# Patient Record
Sex: MALE | Race: WHITE | Employment: OTHER | URBAN - METROPOLITAN AREA
[De-identification: names, ages, dates, MRNs, and addresses within clinical notes are randomized per-mention and may not be internally consistent; named-entity substitution may affect disease eponyms.]

---

## 2017-02-15 ENCOUNTER — HOSPITAL ENCOUNTER (OUTPATIENT)
Dept: LAB | Age: 77
Discharge: HOME | End: 2017-02-15

## 2017-02-15 PROCEDURE — 88305 TISSUE EXAM BY PATHOLOGIST: CPT | Performed by: INTERNAL MEDICINE

## 2017-08-23 ENCOUNTER — APPOINTMENT (RX ONLY)
Dept: URBAN - METROPOLITAN AREA CLINIC 349 | Facility: CLINIC | Age: 77
Setting detail: DERMATOLOGY
End: 2017-08-23

## 2017-08-23 DIAGNOSIS — Z85.828 PERSONAL HISTORY OF OTHER MALIGNANT NEOPLASM OF SKIN: ICD-10-CM

## 2017-08-23 DIAGNOSIS — D485 NEOPLASM OF UNCERTAIN BEHAVIOR OF SKIN: ICD-10-CM

## 2017-08-23 DIAGNOSIS — Z87.2 PERSONAL HISTORY OF DISEASES OF THE SKIN AND SUBCUTANEOUS TISSUE: ICD-10-CM

## 2017-08-23 DIAGNOSIS — D22 MELANOCYTIC NEVI: ICD-10-CM | Status: STABLE

## 2017-08-23 DIAGNOSIS — L57.0 ACTINIC KERATOSIS: ICD-10-CM

## 2017-08-23 PROBLEM — D22.5 MELANOCYTIC NEVI OF TRUNK: Status: ACTIVE | Noted: 2017-08-23

## 2017-08-23 PROBLEM — D22.61 MELANOCYTIC NEVI OF RIGHT UPPER LIMB, INCLUDING SHOULDER: Status: ACTIVE | Noted: 2017-08-23

## 2017-08-23 PROBLEM — D22.71 MELANOCYTIC NEVI OF RIGHT LOWER LIMB, INCLUDING HIP: Status: ACTIVE | Noted: 2017-08-23

## 2017-08-23 PROBLEM — D22.62 MELANOCYTIC NEVI OF LEFT UPPER LIMB, INCLUDING SHOULDER: Status: ACTIVE | Noted: 2017-08-23

## 2017-08-23 PROBLEM — I48.91 UNSPECIFIED ATRIAL FIBRILLATION: Status: ACTIVE | Noted: 2017-08-23

## 2017-08-23 PROBLEM — D22.72 MELANOCYTIC NEVI OF LEFT LOWER LIMB, INCLUDING HIP: Status: ACTIVE | Noted: 2017-08-23

## 2017-08-23 PROBLEM — I10 ESSENTIAL (PRIMARY) HYPERTENSION: Status: ACTIVE | Noted: 2017-08-23

## 2017-08-23 PROBLEM — D48.5 NEOPLASM OF UNCERTAIN BEHAVIOR OF SKIN: Status: ACTIVE | Noted: 2017-08-23

## 2017-08-23 PROBLEM — E78.5 HYPERLIPIDEMIA, UNSPECIFIED: Status: ACTIVE | Noted: 2017-08-23

## 2017-08-23 PROBLEM — C44.519 BASAL CELL CARCINOMA OF SKIN OF OTHER PART OF TRUNK: Status: ACTIVE | Noted: 2017-08-23

## 2017-08-23 PROBLEM — I25.10 ATHEROSCLEROTIC HEART DISEASE OF NATIVE CORONARY ARTERY WITHOUT ANGINA PECTORIS: Status: ACTIVE | Noted: 2017-08-23

## 2017-08-23 PROCEDURE — ? COUNSELING

## 2017-08-23 PROCEDURE — 99214 OFFICE O/P EST MOD 30 MIN: CPT | Mod: 25

## 2017-08-23 PROCEDURE — 17003 DESTRUCT PREMALG LES 2-14: CPT

## 2017-08-23 PROCEDURE — ? BIOPSY BY SHAVE METHOD

## 2017-08-23 PROCEDURE — ? LIQUID NITROGEN

## 2017-08-23 PROCEDURE — A4550 SURGICAL TRAYS: HCPCS

## 2017-08-23 PROCEDURE — 17000 DESTRUCT PREMALG LESION: CPT

## 2017-08-23 PROCEDURE — ? OBSERVATION

## 2017-08-23 PROCEDURE — 11100: CPT | Mod: 59

## 2017-08-23 PROCEDURE — ? MEDICAL PHOTOGRAPHY REVIEW

## 2017-08-23 PROCEDURE — 88304 TISSUE EXAM BY PATHOLOGIST: CPT

## 2017-08-23 PROCEDURE — ? PATHOLOGY BILLING

## 2017-08-23 ASSESSMENT — LOCATION SIMPLE DESCRIPTION DERM
LOCATION SIMPLE: RIGHT UPPER BACK
LOCATION SIMPLE: ABDOMEN
LOCATION SIMPLE: LEFT UPPER BACK
LOCATION SIMPLE: RIGHT FOREHEAD
LOCATION SIMPLE: LOWER BACK
LOCATION SIMPLE: LEFT FOREARM
LOCATION SIMPLE: LEFT CALF
LOCATION SIMPLE: RIGHT PRETIBIAL REGION
LOCATION SIMPLE: LEFT UPPER ARM
LOCATION SIMPLE: LEFT PRETIBIAL REGION
LOCATION SIMPLE: LEFT HAND
LOCATION SIMPLE: RIGHT LOWER BACK
LOCATION SIMPLE: RIGHT CHEEK
LOCATION SIMPLE: LEFT POSTERIOR THIGH
LOCATION SIMPLE: RIGHT SHOULDER
LOCATION SIMPLE: RIGHT POSTERIOR THIGH
LOCATION SIMPLE: UPPER BACK
LOCATION SIMPLE: RIGHT UPPER ARM
LOCATION SIMPLE: LEFT FOREHEAD

## 2017-08-23 ASSESSMENT — LOCATION DETAILED DESCRIPTION DERM
LOCATION DETAILED: RIGHT MEDIAL FOREHEAD
LOCATION DETAILED: SUPERIOR LUMBAR SPINE
LOCATION DETAILED: LEFT PROXIMAL DORSAL FOREARM
LOCATION DETAILED: LEFT LATERAL FOREHEAD
LOCATION DETAILED: RIGHT ANTERIOR SHOULDER
LOCATION DETAILED: RIGHT CENTRAL MALAR CHEEK
LOCATION DETAILED: RIGHT SUPERIOR MEDIAL LOWER BACK
LOCATION DETAILED: RIGHT DISTAL PRETIBIAL REGION
LOCATION DETAILED: LEFT DISTAL POSTERIOR THIGH
LOCATION DETAILED: RIGHT PROXIMAL POSTERIOR THIGH
LOCATION DETAILED: LEFT ULNAR DORSAL HAND
LOCATION DETAILED: RIGHT SUPERIOR FOREHEAD
LOCATION DETAILED: LEFT FOREHEAD
LOCATION DETAILED: INFERIOR THORACIC SPINE
LOCATION DETAILED: LEFT PROXIMAL PRETIBIAL REGION
LOCATION DETAILED: LEFT MID-UPPER BACK
LOCATION DETAILED: RIGHT LATERAL ABDOMEN
LOCATION DETAILED: RIGHT PROXIMAL POSTERIOR UPPER ARM
LOCATION DETAILED: LEFT PROXIMAL POSTERIOR UPPER ARM
LOCATION DETAILED: RIGHT MID-UPPER BACK
LOCATION DETAILED: LEFT DISTAL CALF

## 2017-08-23 ASSESSMENT — LOCATION ZONE DERM
LOCATION ZONE: ARM
LOCATION ZONE: LEG
LOCATION ZONE: TRUNK
LOCATION ZONE: HAND
LOCATION ZONE: FACE

## 2017-08-23 ASSESSMENT — PAIN INTENSITY VAS: HOW INTENSE IS YOUR PAIN 0 BEING NO PAIN, 10 BEING THE MOST SEVERE PAIN POSSIBLE?: NO PAIN

## 2017-08-23 NOTE — PROCEDURE: BIOPSY BY SHAVE METHOD
Biopsy Method: Dermablade
Post-Care Instructions: I reviewed with the patient in detail post-care instructions. Patient is to keep the biopsy site dry overnight, and then apply Vaseline  daily until healed. Patient may apply hydrogen peroxide soaks to remove any crusting. After the procedure, the patient was oriented to person, place, and time. Patient denied feeling dizzy, queasy, and and declined further observation after initial 5 minute observation time.
Biopsy Type: H and E
Cryotherapy Text: The wound bed was treated with cryotherapy after the biopsy was performed.
Type Of Destruction Used: Curettage
Render Post-Care Instructions In Note?: yes
Destruction After The Procedure: No
Electrodesiccation Text: The wound bed was treated with electrodesiccation after the biopsy was performed.
Detail Level: Detailed
Dressing: bandage
Consent: Written consent was obtained and risks were reviewed including but not limited to scarring, infection, bleeding, scabbing, incomplete removal, nerve damage and allergy to anesthesia.
Anesthesia Volume In Cc (Will Not Render If 0): 0.5
Notification Instructions: Patient will be notified of biopsy results. However, patient instructed to call the office if not contacted within 2 weeks. After the procedure, the patient was oriented to person, place, and time. Patient denied feeling dizzy, queasy, and and declined further observation after initial 5 minute observation time.
Electrodesiccation And Curettage Text: The wound bed was treated with electrodesiccation and curettage after the biopsy was performed.
Silver Nitrate Text: The wound bed was treated with silver nitrate after the biopsy was performed.
Size Of Lesion In Cm: 0
Wound Care: Vaseline
Billing Type: Third-Party Bill
Curettage Text: The wound bed was treated with curettage after the biopsy was performed.
Accession #: LHP - physician reading
Anesthesia Type: 1% lidocaine with epinephrine
Hemostasis: Aluminum Chloride

## 2017-09-08 ENCOUNTER — APPOINTMENT (RX ONLY)
Dept: URBAN - METROPOLITAN AREA CLINIC 349 | Facility: CLINIC | Age: 77
Setting detail: DERMATOLOGY
End: 2017-09-08

## 2017-09-08 PROBLEM — I10 ESSENTIAL (PRIMARY) HYPERTENSION: Status: ACTIVE | Noted: 2017-09-08

## 2017-09-08 PROBLEM — C44.519 BASAL CELL CARCINOMA OF SKIN OF OTHER PART OF TRUNK: Status: ACTIVE | Noted: 2017-09-08

## 2017-09-08 PROCEDURE — ? CURETTAGE AND DESTRUCTION

## 2017-09-08 PROCEDURE — ? COUNSELING

## 2017-09-08 PROCEDURE — A4550 SURGICAL TRAYS: HCPCS

## 2017-09-08 PROCEDURE — 17263 DSTRJ MAL LES T/A/L 2.1-3.0: CPT

## 2017-09-08 NOTE — PROCEDURE: CURETTAGE AND DESTRUCTION
Cautery Type: electrodesiccation
Bill For Surgical Tray: yes
Anesthesia Volume In Cc: 1.5
Bill As A Line Item Or As Units: Line Item
Detail Level: Detailed
Post-Care Instructions: I reviewed with the patient in detail post-care instructions. Patient is to keep the area dry for 48 hours, and not to engage in any swimming until the area is healed. Should the patient develop any fevers, chills, bleeding, severe pain patient will contact the office immediately. After the procedure, the patient was oriented to person, place, and time. Patient denied feeling dizzy, queasy, and and declined further observation after initial 5 minute observation time.
What Was Performed First?: Curettage
Size Of Lesion In Cm: 2.7
Consent was obtained from the patient. The risks, benefits and alternatives to therapy were discussed in detail. Specifically, the risks of infection, scarring, bleeding, prolonged wound healing, nerve injury, incomplete removal, allergy to anesthesia and recurrence were addressed. Alternatives to ED&C, such as: surgical removal and XRT were also discussed.  Prior to the procedure, the treatment site was clearly identified and confirmed by the patient. All components of Universal Protocol/PAUSE Rule completed.
Anesthesia Type: 1% lidocaine with epinephrine
Number Of Curettages: 3
Additional Information: (Optional): The wound was cleaned, and a pressure dressing was applied.  The patient received detailed post-op instructions.

## 2018-01-01 ENCOUNTER — HOME CARE VISIT (OUTPATIENT)
Dept: SCHEDULING | Facility: HOME HEALTH | Age: 78
End: 2018-01-01
Payer: MEDICARE

## 2018-01-01 ENCOUNTER — HOSPICE ADMISSION (OUTPATIENT)
Dept: HOSPICE | Facility: HOSPICE | Age: 78
End: 2018-01-01
Payer: MEDICARE

## 2018-01-01 ENCOUNTER — PATIENT OUTREACH (OUTPATIENT)
Dept: CASE MANAGEMENT | Age: 78
End: 2018-01-01

## 2018-01-01 ENCOUNTER — HOME CARE VISIT (OUTPATIENT)
Dept: HOME HEALTH SERVICES | Facility: HOME HEALTH | Age: 78
End: 2018-01-01
Payer: MEDICARE

## 2018-01-01 ENCOUNTER — APPOINTMENT (OUTPATIENT)
Dept: PHYSICAL THERAPY | Age: 78
End: 2018-01-01
Payer: MEDICARE

## 2018-01-01 ENCOUNTER — APPOINTMENT (OUTPATIENT)
Dept: PHYSICAL THERAPY | Age: 78
End: 2018-01-01

## 2018-01-01 ENCOUNTER — HOSPITAL ENCOUNTER (OUTPATIENT)
Dept: PHYSICAL THERAPY | Age: 78
Discharge: HOME | End: 2018-05-03

## 2018-01-01 ENCOUNTER — HOSPITAL ENCOUNTER (OUTPATIENT)
Dept: NUCLEAR MEDICINE | Age: 78
Discharge: HOME | End: 2018-07-02
Attending: FAMILY MEDICINE
Payer: MEDICARE

## 2018-01-01 ENCOUNTER — APPOINTMENT (OUTPATIENT)
Dept: GENERAL RADIOLOGY | Age: 78
DRG: 982 | End: 2018-01-01
Attending: INTERNAL MEDICINE
Payer: MEDICARE

## 2018-01-01 ENCOUNTER — ANESTHESIA (OUTPATIENT)
Dept: SURGERY | Age: 78
DRG: 982 | End: 2018-01-01
Payer: MEDICARE

## 2018-01-01 ENCOUNTER — APPOINTMENT (OUTPATIENT)
Dept: GENERAL RADIOLOGY | Age: 78
End: 2018-01-01
Payer: MEDICARE

## 2018-01-01 ENCOUNTER — HOSPITAL ENCOUNTER (OUTPATIENT)
Dept: PHYSICAL THERAPY | Age: 78
Discharge: HOME | End: 2018-04-09
Payer: MEDICARE

## 2018-01-01 ENCOUNTER — HOSPITAL ENCOUNTER (EMERGENCY)
Age: 78
Discharge: HOME | End: 2018-05-06
Attending: EMERGENCY MEDICINE
Payer: MEDICARE

## 2018-01-01 ENCOUNTER — HOSPITAL ENCOUNTER (OUTPATIENT)
Dept: PHYSICAL THERAPY | Age: 78
Discharge: HOME | End: 2018-04-06
Payer: MEDICARE

## 2018-01-01 ENCOUNTER — HOSPITAL ENCOUNTER (INPATIENT)
Age: 78
LOS: 2 days | End: 2018-09-02
Attending: INTERNAL MEDICINE | Admitting: INTERNAL MEDICINE

## 2018-01-01 ENCOUNTER — HOSPITAL ENCOUNTER (EMERGENCY)
Age: 78
Discharge: HOME | DRG: 375 | End: 2018-08-04
Attending: EMERGENCY MEDICINE
Payer: MEDICARE

## 2018-01-01 ENCOUNTER — APPOINTMENT (OUTPATIENT)
Dept: GENERAL RADIOLOGY | Age: 78
End: 2018-01-01
Attending: EMERGENCY MEDICINE
Payer: MEDICARE

## 2018-01-01 ENCOUNTER — HOSPITAL ENCOUNTER (OUTPATIENT)
Dept: PHYSICAL THERAPY | Age: 78
Discharge: HOME | End: 2018-04-23
Payer: MEDICARE

## 2018-01-01 ENCOUNTER — ANESTHESIA EVENT (OUTPATIENT)
Dept: ENDOSCOPY | Age: 78
DRG: 375 | End: 2018-01-01
Payer: MEDICARE

## 2018-01-01 ENCOUNTER — APPOINTMENT (OUTPATIENT)
Dept: INTERVENTIONAL RADIOLOGY/VASCULAR | Age: 78
DRG: 375 | End: 2018-01-01
Attending: SURGERY
Payer: MEDICARE

## 2018-01-01 ENCOUNTER — ANESTHESIA EVENT (OUTPATIENT)
Dept: SURGERY | Age: 78
DRG: 982 | End: 2018-01-01
Payer: MEDICARE

## 2018-01-01 ENCOUNTER — HOSPITAL ENCOUNTER (OUTPATIENT)
Dept: PHYSICAL THERAPY | Age: 78
Discharge: HOME | End: 2018-03-29
Payer: MEDICARE

## 2018-01-01 ENCOUNTER — APPOINTMENT (OUTPATIENT)
Dept: GENERAL RADIOLOGY | Age: 78
DRG: 375 | End: 2018-01-01
Attending: EMERGENCY MEDICINE
Payer: MEDICARE

## 2018-01-01 ENCOUNTER — HOSPITAL ENCOUNTER (OUTPATIENT)
Dept: PHYSICAL THERAPY | Age: 78
Discharge: HOME | End: 2018-04-16
Payer: MEDICARE

## 2018-01-01 ENCOUNTER — ANESTHESIA (OUTPATIENT)
Dept: ENDOSCOPY | Age: 78
DRG: 375 | End: 2018-01-01
Payer: MEDICARE

## 2018-01-01 ENCOUNTER — HOSPITAL ENCOUNTER (OUTPATIENT)
Dept: PHYSICAL THERAPY | Age: 78
End: 2018-01-01

## 2018-01-01 ENCOUNTER — HOSPITAL ENCOUNTER (OUTPATIENT)
Dept: PHYSICAL THERAPY | Age: 78
Discharge: HOME | End: 2018-03-22
Payer: MEDICARE

## 2018-01-01 ENCOUNTER — HOME CARE VISIT (OUTPATIENT)
Dept: HOSPICE | Facility: HOSPICE | Age: 78
End: 2018-01-01
Payer: MEDICARE

## 2018-01-01 ENCOUNTER — HOSPITAL ENCOUNTER (EMERGENCY)
Age: 78
Discharge: HOME | End: 2018-08-02
Attending: EMERGENCY MEDICINE
Payer: MEDICARE

## 2018-01-01 ENCOUNTER — HOSPITAL ENCOUNTER (OUTPATIENT)
Dept: SURGERY | Age: 78
Discharge: HOME | End: 2018-07-17

## 2018-01-01 ENCOUNTER — HOSPITAL ENCOUNTER (OUTPATIENT)
Dept: PHYSICAL THERAPY | Age: 78
Discharge: HOME | End: 2018-04-20
Payer: MEDICARE

## 2018-01-01 ENCOUNTER — HOME HEALTH ADMISSION (OUTPATIENT)
Dept: HOME HEALTH SERVICES | Facility: HOME HEALTH | Age: 78
End: 2018-01-01
Payer: MEDICARE

## 2018-01-01 ENCOUNTER — APPOINTMENT (OUTPATIENT)
Dept: CT IMAGING | Age: 78
DRG: 375 | End: 2018-01-01
Attending: NURSE PRACTITIONER
Payer: MEDICARE

## 2018-01-01 ENCOUNTER — HOSPITAL ENCOUNTER (OUTPATIENT)
Dept: PHYSICAL THERAPY | Age: 78
Discharge: HOME | End: 2018-03-27
Payer: MEDICARE

## 2018-01-01 ENCOUNTER — HOSPITAL ENCOUNTER (OUTPATIENT)
Dept: PHYSICAL THERAPY | Age: 78
Discharge: HOME | End: 2018-04-17
Payer: MEDICARE

## 2018-01-01 ENCOUNTER — HOSPITAL ENCOUNTER (INPATIENT)
Age: 78
LOS: 3 days | Discharge: HOSPICE | DRG: 375 | End: 2018-08-10
Attending: SURGERY | Admitting: SURGERY
Payer: MEDICARE

## 2018-01-01 ENCOUNTER — HOSPITAL ENCOUNTER (OUTPATIENT)
Dept: PHYSICAL THERAPY | Age: 78
Discharge: HOME | End: 2018-04-13
Payer: MEDICARE

## 2018-01-01 ENCOUNTER — HOSPITAL ENCOUNTER (INPATIENT)
Age: 78
LOS: 3 days | Discharge: HOME HEALTH | DRG: 982 | End: 2018-07-24
Attending: SURGERY | Admitting: SURGERY
Payer: MEDICARE

## 2018-01-01 VITALS — HEART RATE: 92 BPM | DIASTOLIC BLOOD PRESSURE: 74 MMHG | SYSTOLIC BLOOD PRESSURE: 120 MMHG | RESPIRATION RATE: 20 BRPM

## 2018-01-01 VITALS — SYSTOLIC BLOOD PRESSURE: 132 MMHG | DIASTOLIC BLOOD PRESSURE: 74 MMHG | RESPIRATION RATE: 24 BRPM | HEART RATE: 96 BPM

## 2018-01-01 VITALS
TEMPERATURE: 97.7 F | HEART RATE: 80 BPM | SYSTOLIC BLOOD PRESSURE: 122 MMHG | RESPIRATION RATE: 20 BRPM | DIASTOLIC BLOOD PRESSURE: 60 MMHG

## 2018-01-01 VITALS
TEMPERATURE: 97.9 F | RESPIRATION RATE: 18 BRPM | OXYGEN SATURATION: 97 % | SYSTOLIC BLOOD PRESSURE: 106 MMHG | HEART RATE: 87 BPM | DIASTOLIC BLOOD PRESSURE: 58 MMHG

## 2018-01-01 VITALS
BODY MASS INDEX: 30.47 KG/M2 | HEIGHT: 69 IN | HEART RATE: 87 BPM | WEIGHT: 205.7 LBS | RESPIRATION RATE: 18 BRPM | TEMPERATURE: 97 F | DIASTOLIC BLOOD PRESSURE: 71 MMHG | SYSTOLIC BLOOD PRESSURE: 104 MMHG | OXYGEN SATURATION: 93 %

## 2018-01-01 VITALS
RESPIRATION RATE: 16 BRPM | DIASTOLIC BLOOD PRESSURE: 63 MMHG | SYSTOLIC BLOOD PRESSURE: 118 MMHG | OXYGEN SATURATION: 94 % | HEIGHT: 69 IN | BODY MASS INDEX: 33.33 KG/M2 | WEIGHT: 225 LBS | HEART RATE: 70 BPM | TEMPERATURE: 97.5 F

## 2018-01-01 VITALS
RESPIRATION RATE: 16 BRPM | HEART RATE: 78 BPM | SYSTOLIC BLOOD PRESSURE: 131 MMHG | BODY MASS INDEX: 31.23 KG/M2 | OXYGEN SATURATION: 92 % | DIASTOLIC BLOOD PRESSURE: 79 MMHG | TEMPERATURE: 97.8 F | WEIGHT: 211.5 LBS

## 2018-01-01 VITALS
SYSTOLIC BLOOD PRESSURE: 118 MMHG | TEMPERATURE: 98 F | DIASTOLIC BLOOD PRESSURE: 69 MMHG | HEIGHT: 69 IN | OXYGEN SATURATION: 99 % | WEIGHT: 237 LBS | RESPIRATION RATE: 16 BRPM | HEART RATE: 80 BPM | BODY MASS INDEX: 35.1 KG/M2

## 2018-01-01 VITALS
HEART RATE: 96 BPM | DIASTOLIC BLOOD PRESSURE: 81 MMHG | RESPIRATION RATE: 20 BRPM | RESPIRATION RATE: 19 BRPM | HEART RATE: 84 BPM | SYSTOLIC BLOOD PRESSURE: 116 MMHG | SYSTOLIC BLOOD PRESSURE: 143 MMHG | DIASTOLIC BLOOD PRESSURE: 66 MMHG | TEMPERATURE: 98.3 F

## 2018-01-01 VITALS
DIASTOLIC BLOOD PRESSURE: 76 MMHG | SYSTOLIC BLOOD PRESSURE: 136 MMHG | TEMPERATURE: 98.1 F | RESPIRATION RATE: 16 BRPM | HEART RATE: 90 BPM

## 2018-01-01 VITALS
OXYGEN SATURATION: 95 % | BODY MASS INDEX: 37.18 KG/M2 | HEIGHT: 69 IN | RESPIRATION RATE: 21 BRPM | DIASTOLIC BLOOD PRESSURE: 60 MMHG | SYSTOLIC BLOOD PRESSURE: 128 MMHG | TEMPERATURE: 98.2 F | WEIGHT: 251 LBS | HEART RATE: 74 BPM

## 2018-01-01 VITALS — DIASTOLIC BLOOD PRESSURE: 74 MMHG | SYSTOLIC BLOOD PRESSURE: 120 MMHG | HEART RATE: 76 BPM | RESPIRATION RATE: 20 BRPM

## 2018-01-01 VITALS — BODY MASS INDEX: 36.18 KG/M2 | WEIGHT: 245 LBS

## 2018-01-01 VITALS
TEMPERATURE: 97.7 F | OXYGEN SATURATION: 95 % | BODY MASS INDEX: 35.1 KG/M2 | HEART RATE: 66 BPM | SYSTOLIC BLOOD PRESSURE: 118 MMHG | WEIGHT: 237 LBS | DIASTOLIC BLOOD PRESSURE: 62 MMHG | RESPIRATION RATE: 18 BRPM | HEIGHT: 69 IN

## 2018-01-01 VITALS — WEIGHT: 244 LBS | BODY MASS INDEX: 36.14 KG/M2 | HEIGHT: 69 IN

## 2018-01-01 VITALS — SYSTOLIC BLOOD PRESSURE: 126 MMHG | RESPIRATION RATE: 22 BRPM | HEART RATE: 96 BPM | DIASTOLIC BLOOD PRESSURE: 60 MMHG

## 2018-01-01 DIAGNOSIS — R18.0 MALIGNANT ASCITES: ICD-10-CM

## 2018-01-01 DIAGNOSIS — R00.2 PALPITATIONS: Primary | ICD-10-CM

## 2018-01-01 DIAGNOSIS — N17.9 ACUTE RENAL FAILURE SUPERIMPOSED ON CHRONIC KIDNEY DISEASE, UNSPECIFIED CKD STAGE, UNSPECIFIED ACUTE RENAL FAILURE TYPE (HCC): ICD-10-CM

## 2018-01-01 DIAGNOSIS — I25.10 CORONARY ARTERY DISEASE INVOLVING NATIVE CORONARY ARTERY OF NATIVE HEART WITHOUT ANGINA PECTORIS: ICD-10-CM

## 2018-01-01 DIAGNOSIS — K80.00 CALCULUS OF GALLBLADDER WITH ACUTE CHOLECYSTITIS WITHOUT OBSTRUCTION: Primary | Chronic | ICD-10-CM

## 2018-01-01 DIAGNOSIS — R10.9 ABDOMINAL PAIN, UNSPECIFIED ABDOMINAL LOCATION: ICD-10-CM

## 2018-01-01 DIAGNOSIS — J98.11 ATELECTASIS: ICD-10-CM

## 2018-01-01 DIAGNOSIS — K80.12 CALCULUS OF GALLBLADDER WITH ACUTE ON CHRONIC CHOLECYSTITIS WITHOUT OBSTRUCTION: ICD-10-CM

## 2018-01-01 DIAGNOSIS — C78.6 PERITONEAL CARCINOMATOSIS (HCC): Primary | ICD-10-CM

## 2018-01-01 DIAGNOSIS — N18.9 ACUTE RENAL FAILURE SUPERIMPOSED ON CHRONIC KIDNEY DISEASE, UNSPECIFIED CKD STAGE, UNSPECIFIED ACUTE RENAL FAILURE TYPE (HCC): ICD-10-CM

## 2018-01-01 DIAGNOSIS — Z90.49 S/P LAPAROSCOPIC CHOLECYSTECTOMY: ICD-10-CM

## 2018-01-01 DIAGNOSIS — E66.01 SEVERE OBESITY (BMI 35.0-39.9) WITH COMORBIDITY (HCC): ICD-10-CM

## 2018-01-01 DIAGNOSIS — N18.30 CHRONIC KIDNEY DISEASE, STAGE III (MODERATE) (HCC): ICD-10-CM

## 2018-01-01 DIAGNOSIS — K59.00 CONSTIPATION, UNSPECIFIED CONSTIPATION TYPE: Primary | ICD-10-CM

## 2018-01-01 DIAGNOSIS — D69.6 THROMBOCYTOPENIA (HCC): ICD-10-CM

## 2018-01-01 DIAGNOSIS — C78.6 PERITONEAL CARCINOMATOSIS (HCC): ICD-10-CM

## 2018-01-01 DIAGNOSIS — R14.3 FLATULENCE: ICD-10-CM

## 2018-01-01 DIAGNOSIS — R10.84 GENERALIZED ABDOMINAL PAIN: ICD-10-CM

## 2018-01-01 DIAGNOSIS — R09.02 HYPOXIA: ICD-10-CM

## 2018-01-01 DIAGNOSIS — J95.821 RESPIRATORY FAILURE, POST-OPERATIVE (HCC): ICD-10-CM

## 2018-01-01 DIAGNOSIS — I10 ESSENTIAL HYPERTENSION: ICD-10-CM

## 2018-01-01 LAB
ALBUMIN SERPL-MCNC: 2.6 G/DL (ref 3.2–4.6)
ALBUMIN SERPL-MCNC: 3.8 G/DL (ref 3.2–4.6)
ALBUMIN/GLOB SERPL: 0.7 {RATIO} (ref 1.2–3.5)
ALBUMIN/GLOB SERPL: 1.2 {RATIO} (ref 1.2–3.5)
ALP SERPL-CCNC: 114 U/L (ref 50–136)
ALP SERPL-CCNC: 53 U/L (ref 50–136)
ALT SERPL-CCNC: 24 U/L (ref 12–65)
ALT SERPL-CCNC: 52 U/L (ref 12–65)
ANION GAP SERPL CALC-SCNC: 10 MMOL/L (ref 7–16)
ANION GAP SERPL CALC-SCNC: 12 MMOL/L (ref 7–16)
ANION GAP SERPL CALC-SCNC: 13 MMOL/L (ref 7–16)
ANION GAP SERPL CALC-SCNC: 6 MMOL/L (ref 7–16)
ANION GAP SERPL CALC-SCNC: 8 MMOL/L (ref 7–16)
AST SERPL-CCNC: 30 U/L (ref 15–37)
AST SERPL-CCNC: 73 U/L (ref 15–37)
ATRIAL RATE: 90 BPM
BACTERIA URNS QL MICRO: NORMAL /HPF
BASOPHILS # BLD: 0 K/UL (ref 0–0.2)
BASOPHILS NFR BLD: 0 % (ref 0–2)
BILIRUB SERPL-MCNC: 0.7 MG/DL (ref 0.2–1.1)
BILIRUB SERPL-MCNC: 1.1 MG/DL (ref 0.2–1.1)
BUN SERPL-MCNC: 22 MG/DL (ref 8–23)
BUN SERPL-MCNC: 24 MG/DL (ref 8–23)
BUN SERPL-MCNC: 30 MG/DL (ref 8–23)
BUN SERPL-MCNC: 34 MG/DL (ref 8–23)
BUN SERPL-MCNC: 40 MG/DL (ref 8–23)
CALCIUM SERPL-MCNC: 8.7 MG/DL (ref 8.3–10.4)
CALCIUM SERPL-MCNC: 8.8 MG/DL (ref 8.3–10.4)
CALCIUM SERPL-MCNC: 8.9 MG/DL (ref 8.3–10.4)
CALCIUM SERPL-MCNC: 9.1 MG/DL (ref 8.3–10.4)
CALCIUM SERPL-MCNC: 9.5 MG/DL (ref 8.3–10.4)
CALCULATED P AXIS, ECG09: 17 DEGREES
CALCULATED R AXIS, ECG10: 39 DEGREES
CALCULATED T AXIS, ECG11: 0 DEGREES
CANCER AG19-9 SERPL-ACNC: 537.1 U/ML (ref 2–37)
CASTS URNS QL MICRO: 0 /LPF
CEA SERPL-MCNC: 2.7 NG/ML (ref 0–3)
CHLORIDE SERPL-SCNC: 104 MMOL/L (ref 98–107)
CHLORIDE SERPL-SCNC: 106 MMOL/L (ref 98–107)
CHLORIDE SERPL-SCNC: 108 MMOL/L (ref 98–107)
CHLORIDE SERPL-SCNC: 109 MMOL/L (ref 98–107)
CHLORIDE SERPL-SCNC: 109 MMOL/L (ref 98–107)
CO2 SERPL-SCNC: 25 MMOL/L (ref 21–32)
CO2 SERPL-SCNC: 25 MMOL/L (ref 21–32)
CO2 SERPL-SCNC: 27 MMOL/L (ref 21–32)
CO2 SERPL-SCNC: 28 MMOL/L (ref 21–32)
CO2 SERPL-SCNC: 31 MMOL/L (ref 21–32)
CREAT SERPL-MCNC: 1.17 MG/DL (ref 0.8–1.5)
CREAT SERPL-MCNC: 1.26 MG/DL (ref 0.8–1.5)
CREAT SERPL-MCNC: 1.33 MG/DL (ref 0.8–1.5)
CREAT SERPL-MCNC: 1.59 MG/DL (ref 0.8–1.5)
CREAT SERPL-MCNC: 1.61 MG/DL (ref 0.8–1.5)
CREAT SERPL-MCNC: 1.76 MG/DL (ref 0.8–1.5)
CREAT SERPL-MCNC: 2.43 MG/DL (ref 0.8–1.5)
CRYSTALS URNS QL MICRO: 0 /LPF
DIAGNOSIS, 93000: NORMAL
DIFFERENTIAL METHOD BLD: ABNORMAL
DIFFERENTIAL METHOD BLD: ABNORMAL
DIFFERENTIAL METHOD BLD: NORMAL
EOSINOPHIL # BLD: 0.1 K/UL (ref 0–0.8)
EOSINOPHIL NFR BLD: 1 % (ref 0.5–7.8)
EOSINOPHIL NFR BLD: 2 % (ref 0.5–7.8)
EOSINOPHIL NFR BLD: 2 % (ref 0.5–7.8)
EPI CELLS #/AREA URNS HPF: NORMAL /HPF
ERYTHROCYTE [DISTWIDTH] IN BLOOD BY AUTOMATED COUNT: 13.6 % (ref 11.9–14.6)
ERYTHROCYTE [DISTWIDTH] IN BLOOD BY AUTOMATED COUNT: 14.1 % (ref 11.9–14.6)
ERYTHROCYTE [DISTWIDTH] IN BLOOD BY AUTOMATED COUNT: 14.2 % (ref 11.9–14.6)
ERYTHROCYTE [DISTWIDTH] IN BLOOD BY AUTOMATED COUNT: 14.5 % (ref 11.9–14.6)
EST. AVERAGE GLUCOSE BLD GHB EST-MCNC: 123 MG/DL
FERRITIN SERPL-MCNC: 542 NG/ML (ref 8–388)
FOLATE SERPL-MCNC: 11.8 NG/ML (ref 3.1–17.5)
GLOBULIN SER CALC-MCNC: 3.3 G/DL (ref 2.3–3.5)
GLOBULIN SER CALC-MCNC: 3.5 G/DL (ref 2.3–3.5)
GLUCOSE BLD STRIP.AUTO-MCNC: 100 MG/DL (ref 65–100)
GLUCOSE BLD STRIP.AUTO-MCNC: 11 MG/DL (ref 65–100)
GLUCOSE BLD STRIP.AUTO-MCNC: 80 MG/DL (ref 65–100)
GLUCOSE SERPL-MCNC: 106 MG/DL (ref 65–100)
GLUCOSE SERPL-MCNC: 107 MG/DL (ref 65–100)
GLUCOSE SERPL-MCNC: 108 MG/DL (ref 65–100)
GLUCOSE SERPL-MCNC: 118 MG/DL (ref 65–100)
GLUCOSE SERPL-MCNC: 123 MG/DL (ref 65–100)
HBA1C MFR BLD: 5.9 % (ref 4.8–6)
HCT VFR BLD AUTO: 38.1 % (ref 41.1–50.3)
HCT VFR BLD AUTO: 38.6 % (ref 41.1–50.3)
HCT VFR BLD AUTO: 40.8 % (ref 41.1–50.3)
HCT VFR BLD AUTO: 43 % (ref 41.1–50.3)
HGB BLD-MCNC: 12.7 G/DL (ref 13.6–17.2)
HGB BLD-MCNC: 12.8 G/DL (ref 13.6–17.2)
HGB BLD-MCNC: 14.5 G/DL (ref 13.6–17.2)
HGB BLD-MCNC: 14.6 G/DL (ref 13.6–17.2)
HGB BLD-MCNC: 15 G/DL (ref 13.6–17.2)
IMM GRANULOCYTES # BLD: 0 K/UL (ref 0–0.5)
IMM GRANULOCYTES NFR BLD AUTO: 0 % (ref 0–5)
IRON SATN MFR SERPL: 20 %
IRON SERPL-MCNC: 41 UG/DL (ref 35–150)
LIPASE SERPL-CCNC: 307 U/L (ref 73–393)
LYMPHOCYTES # BLD: 0.5 K/UL (ref 0.5–4.6)
LYMPHOCYTES # BLD: 1.2 K/UL (ref 0.5–4.6)
LYMPHOCYTES # BLD: 1.4 K/UL (ref 0.5–4.6)
LYMPHOCYTES NFR BLD: 11 % (ref 13–44)
LYMPHOCYTES NFR BLD: 15 % (ref 13–44)
LYMPHOCYTES NFR BLD: 17 % (ref 13–44)
MCH RBC QN AUTO: 31.3 PG (ref 26.1–32.9)
MCH RBC QN AUTO: 31.8 PG (ref 26.1–32.9)
MCH RBC QN AUTO: 32.1 PG (ref 26.1–32.9)
MCH RBC QN AUTO: 33.3 PG (ref 26.1–32.9)
MCHC RBC AUTO-ENTMCNC: 33.2 G/DL (ref 31.4–35)
MCHC RBC AUTO-ENTMCNC: 33.3 G/DL (ref 31.4–35)
MCHC RBC AUTO-ENTMCNC: 34 G/DL (ref 31.4–35)
MCHC RBC AUTO-ENTMCNC: 35.5 G/DL (ref 31.4–35)
MCV RBC AUTO: 93.6 FL (ref 79.6–97.8)
MCV RBC AUTO: 94.4 FL (ref 79.6–97.8)
MCV RBC AUTO: 94.5 FL (ref 79.6–97.8)
MCV RBC AUTO: 95.5 FL (ref 79.6–97.8)
MONOCYTES # BLD: 0.4 K/UL (ref 0.1–1.3)
MONOCYTES # BLD: 0.7 K/UL (ref 0.1–1.3)
MONOCYTES # BLD: 0.8 K/UL (ref 0.1–1.3)
MONOCYTES NFR BLD: 11 % (ref 4–12)
MONOCYTES NFR BLD: 7 % (ref 4–12)
MONOCYTES NFR BLD: 9 % (ref 4–12)
MUCOUS THREADS URNS QL MICRO: 0 /LPF
NEUTS SEG # BLD: 3.7 K/UL (ref 1.7–8.2)
NEUTS SEG # BLD: 5.1 K/UL (ref 1.7–8.2)
NEUTS SEG # BLD: 7 K/UL (ref 1.7–8.2)
NEUTS SEG NFR BLD: 70 % (ref 43–78)
NEUTS SEG NFR BLD: 77 % (ref 43–78)
NEUTS SEG NFR BLD: 78 % (ref 43–78)
P-R INTERVAL, ECG05: 242 MS
PLATELET # BLD AUTO: 135 K/UL (ref 150–450)
PLATELET # BLD AUTO: 253 K/UL (ref 150–450)
PLATELET # BLD AUTO: 95 K/UL (ref 150–450)
PLATELET # BLD AUTO: 97 K/UL (ref 150–450)
PMV BLD AUTO: 12.5 FL (ref 10.8–14.1)
PMV BLD AUTO: 12.6 FL (ref 10.8–14.1)
PMV BLD AUTO: 13 FL (ref 10.8–14.1)
PMV BLD AUTO: 13.7 FL (ref 10.8–14.1)
POTASSIUM SERPL-SCNC: 3.4 MMOL/L (ref 3.5–5.1)
POTASSIUM SERPL-SCNC: 3.5 MMOL/L (ref 3.5–5.1)
POTASSIUM SERPL-SCNC: 3.6 MMOL/L (ref 3.5–5.1)
POTASSIUM SERPL-SCNC: 3.6 MMOL/L (ref 3.5–5.1)
POTASSIUM SERPL-SCNC: 3.7 MMOL/L (ref 3.5–5.1)
POTASSIUM SERPL-SCNC: 4 MMOL/L (ref 3.5–5.1)
PROT SERPL-MCNC: 6.1 G/DL (ref 6.3–8.2)
PROT SERPL-MCNC: 7.1 G/DL (ref 6.3–8.2)
Q-T INTERVAL, ECG07: 358 MS
QRS DURATION, ECG06: 90 MS
QTC CALCULATION (BEZET), ECG08: 437 MS
RBC # BLD AUTO: 3.99 M/UL (ref 4.23–5.67)
RBC # BLD AUTO: 4.09 M/UL (ref 4.23–5.67)
RBC # BLD AUTO: 4.36 M/UL (ref 4.23–5.67)
RBC # BLD AUTO: 4.55 M/UL (ref 4.23–5.67)
RBC #/AREA URNS HPF: NORMAL /HPF
SODIUM SERPL-SCNC: 143 MMOL/L (ref 136–145)
SODIUM SERPL-SCNC: 144 MMOL/L (ref 136–145)
SODIUM SERPL-SCNC: 144 MMOL/L (ref 136–145)
SODIUM SERPL-SCNC: 145 MMOL/L (ref 136–145)
SODIUM SERPL-SCNC: 145 MMOL/L (ref 136–145)
TIBC SERPL-MCNC: 206 UG/DL (ref 250–450)
TROPONIN I BLD-MCNC: 0.01 NG/ML (ref 0.02–0.05)
TROPONIN I SERPL-MCNC: <0.02 NG/ML (ref 0.02–0.05)
VENTRICULAR RATE, ECG03: 90 BPM
VIT B12 SERPL-MCNC: 683 PG/ML (ref 193–986)
WBC # BLD AUTO: 4.7 K/UL (ref 4.3–11.1)
WBC # BLD AUTO: 7.3 K/UL (ref 4.3–11.1)
WBC # BLD AUTO: 7.4 K/UL (ref 4.3–11.1)
WBC # BLD AUTO: 9.2 K/UL (ref 4.3–11.1)
WBC URNS QL MICRO: NORMAL /HPF

## 2018-01-01 PROCEDURE — 65270000029 HC RM PRIVATE

## 2018-01-01 PROCEDURE — 3331090003 HH PPS REVENUE ADJ

## 2018-01-01 PROCEDURE — 3331090001 HH PPS REVENUE CREDIT

## 2018-01-01 PROCEDURE — 74011250636 HC RX REV CODE- 250/636: Performed by: FAMILY MEDICINE

## 2018-01-01 PROCEDURE — 74011250637 HC RX REV CODE- 250/637: Performed by: EMERGENCY MEDICINE

## 2018-01-01 PROCEDURE — 74011000250 HC RX REV CODE- 250: Performed by: NURSE PRACTITIONER

## 2018-01-01 PROCEDURE — 74011636320 HC RX REV CODE- 636/320: Performed by: SURGERY

## 2018-01-01 PROCEDURE — 77030019605

## 2018-01-01 PROCEDURE — 85025 COMPLETE CBC W/AUTO DIFF WBC: CPT | Performed by: INTERNAL MEDICINE

## 2018-01-01 PROCEDURE — G0299 HHS/HOSPICE OF RN EA 15 MIN: HCPCS

## 2018-01-01 PROCEDURE — HOSPICE MEDICATION HC HH HOSPICE MEDICATION

## 2018-01-01 PROCEDURE — 74011000250 HC RX REV CODE- 250: Performed by: RADIOLOGY

## 2018-01-01 PROCEDURE — 3331090002 HH PPS REVENUE DEBIT

## 2018-01-01 PROCEDURE — 71045 X-RAY EXAM CHEST 1 VIEW: CPT

## 2018-01-01 PROCEDURE — 74011250636 HC RX REV CODE- 250/636: Performed by: ANESTHESIOLOGY

## 2018-01-01 PROCEDURE — 80048 BASIC METABOLIC PNL TOTAL CA: CPT | Performed by: SURGERY

## 2018-01-01 PROCEDURE — 0FB04ZX EXCISION OF LIVER, PERCUTANEOUS ENDOSCOPIC APPROACH, DIAGNOSTIC: ICD-10-PCS | Performed by: SURGERY

## 2018-01-01 PROCEDURE — 97113 AQUATIC THERAPY/EXERCISES: CPT

## 2018-01-01 PROCEDURE — 74011250637 HC RX REV CODE- 250/637: Performed by: INTERNAL MEDICINE

## 2018-01-01 PROCEDURE — 74011250637 HC RX REV CODE- 250/637: Performed by: SURGERY

## 2018-01-01 PROCEDURE — 94640 AIRWAY INHALATION TREATMENT: CPT

## 2018-01-01 PROCEDURE — G0155 HHCP-SVS OF CSW,EA 15 MIN: HCPCS

## 2018-01-01 PROCEDURE — 0651 HSPC ROUTINE HOME CARE

## 2018-01-01 PROCEDURE — 81003 URINALYSIS AUTO W/O SCOPE: CPT | Performed by: EMERGENCY MEDICINE

## 2018-01-01 PROCEDURE — 49083 ABD PARACENTESIS W/IMAGING: CPT

## 2018-01-01 PROCEDURE — 82728 ASSAY OF FERRITIN: CPT

## 2018-01-01 PROCEDURE — 99284 EMERGENCY DEPT VISIT MOD MDM: CPT | Performed by: EMERGENCY MEDICINE

## 2018-01-01 PROCEDURE — 97110 THERAPEUTIC EXERCISES: CPT

## 2018-01-01 PROCEDURE — 71046 X-RAY EXAM CHEST 2 VIEWS: CPT

## 2018-01-01 PROCEDURE — 0DJ08ZZ INSPECTION OF UPPER INTESTINAL TRACT, VIA NATURAL OR ARTIFICIAL OPENING ENDOSCOPIC: ICD-10-PCS | Performed by: INTERNAL MEDICINE

## 2018-01-01 PROCEDURE — 36415 COLL VENOUS BLD VENIPUNCTURE: CPT | Performed by: INTERNAL MEDICINE

## 2018-01-01 PROCEDURE — 99223 1ST HOSP IP/OBS HIGH 75: CPT | Performed by: INTERNAL MEDICINE

## 2018-01-01 PROCEDURE — 88112 CYTOPATH CELL ENHANCE TECH: CPT | Performed by: SURGERY

## 2018-01-01 PROCEDURE — 77030037400 HC ADH TISS HI VISC EXOFIN CHMP -B

## 2018-01-01 PROCEDURE — 99343 PR HOME VST NEW PATIENT MOD-HI SEVERITY 45 MINUTES: CPT | Performed by: INTERNAL MEDICINE

## 2018-01-01 PROCEDURE — 97161 PT EVAL LOW COMPLEX 20 MIN: CPT

## 2018-01-01 PROCEDURE — 74011250636 HC RX REV CODE- 250/636: Performed by: SURGERY

## 2018-01-01 PROCEDURE — 76060000034 HC ANESTHESIA 1.5 TO 2 HR: Performed by: SURGERY

## 2018-01-01 PROCEDURE — 74011000250 HC RX REV CODE- 250: Performed by: SURGERY

## 2018-01-01 PROCEDURE — 74011000250 HC RX REV CODE- 250

## 2018-01-01 PROCEDURE — 97140 MANUAL THERAPY 1/> REGIONS: CPT

## 2018-01-01 PROCEDURE — 77030010541

## 2018-01-01 PROCEDURE — 74011250637 HC RX REV CODE- 250/637: Performed by: ANESTHESIOLOGY

## 2018-01-01 PROCEDURE — G0151 HHCP-SERV OF PT,EA 15 MIN: HCPCS

## 2018-01-01 PROCEDURE — 74011250637 HC RX REV CODE- 250/637: Performed by: NURSE PRACTITIONER

## 2018-01-01 PROCEDURE — 76060000031 HC ANESTHESIA FIRST 0.5 HR: Performed by: INTERNAL MEDICINE

## 2018-01-01 PROCEDURE — 77030020782 HC GWN BAIR PAWS FLX 3M -B: Performed by: ANESTHESIOLOGY

## 2018-01-01 PROCEDURE — 77030035048 HC TRCR ENDOSC OPTCL COVD -B: Performed by: SURGERY

## 2018-01-01 PROCEDURE — 94760 N-INVAS EAR/PLS OXIMETRY 1: CPT

## 2018-01-01 PROCEDURE — 88304 TISSUE EXAM BY PATHOLOGIST: CPT | Performed by: SURGERY

## 2018-01-01 PROCEDURE — 65610000001 HC ROOM ICU GENERAL

## 2018-01-01 PROCEDURE — 97116 GAIT TRAINING THERAPY: CPT

## 2018-01-01 PROCEDURE — 0FT44ZZ RESECTION OF GALLBLADDER, PERCUTANEOUS ENDOSCOPIC APPROACH: ICD-10-PCS | Performed by: SURGERY

## 2018-01-01 PROCEDURE — 36415 COLL VENOUS BLD VENIPUNCTURE: CPT | Performed by: SURGERY

## 2018-01-01 PROCEDURE — 93005 ELECTROCARDIOGRAM TRACING: CPT | Performed by: EMERGENCY MEDICINE

## 2018-01-01 PROCEDURE — 77030027138 HC INCENT SPIROMETER -A

## 2018-01-01 PROCEDURE — 0656 HSPC GENERAL INPATIENT

## 2018-01-01 PROCEDURE — 97162 PT EVAL MOD COMPLEX 30 MIN: CPT

## 2018-01-01 PROCEDURE — 3336500001 HSPC ELECTION

## 2018-01-01 PROCEDURE — 99232 SBSQ HOSP IP/OBS MODERATE 35: CPT | Performed by: INTERNAL MEDICINE

## 2018-01-01 PROCEDURE — 77030008756 HC TU IRR SUC STRY -B: Performed by: SURGERY

## 2018-01-01 PROCEDURE — 74011250636 HC RX REV CODE- 250/636: Performed by: NURSE PRACTITIONER

## 2018-01-01 PROCEDURE — 77030016151 HC PROTCTR LNS DFOG COVD -B: Performed by: SURGERY

## 2018-01-01 PROCEDURE — 80053 COMPREHEN METABOLIC PANEL: CPT

## 2018-01-01 PROCEDURE — 36415 COLL VENOUS BLD VENIPUNCTURE: CPT

## 2018-01-01 PROCEDURE — 0DBW4ZX EXCISION OF PERITONEUM, PERCUTANEOUS ENDOSCOPIC APPROACH, DIAGNOSTIC: ICD-10-PCS | Performed by: SURGERY

## 2018-01-01 PROCEDURE — 74011250636 HC RX REV CODE- 250/636

## 2018-01-01 PROCEDURE — 96372 THER/PROPH/DIAG INJ SC/IM: CPT

## 2018-01-01 PROCEDURE — 86301 IMMUNOASSAY TUMOR CA 19-9: CPT

## 2018-01-01 PROCEDURE — 85018 HEMOGLOBIN: CPT | Performed by: ANESTHESIOLOGY

## 2018-01-01 PROCEDURE — 82962 GLUCOSE BLOOD TEST: CPT

## 2018-01-01 PROCEDURE — 99285 EMERGENCY DEPT VISIT HI MDM: CPT | Performed by: EMERGENCY MEDICINE

## 2018-01-01 PROCEDURE — 77030009852 HC PCH RTVR ENDOSC COVD -B: Performed by: SURGERY

## 2018-01-01 PROCEDURE — G8979 MOBILITY GOAL STATUS: HCPCS

## 2018-01-01 PROCEDURE — 76210000000 HC OR PH I REC 2 TO 2.5 HR: Performed by: SURGERY

## 2018-01-01 PROCEDURE — 82607 VITAMIN B-12: CPT

## 2018-01-01 PROCEDURE — 65390000012 HC CONDITION CODE 44 OBSERVATION

## 2018-01-01 PROCEDURE — 76450000000

## 2018-01-01 PROCEDURE — 88305 TISSUE EXAM BY PATHOLOGIST: CPT | Performed by: SURGERY

## 2018-01-01 PROCEDURE — G8978 MOBILITY CURRENT STATUS: HCPCS

## 2018-01-01 PROCEDURE — 85025 COMPLETE CBC W/AUTO DIFF WBC: CPT

## 2018-01-01 PROCEDURE — 88312 SPECIAL STAINS GROUP 1: CPT | Performed by: SURGERY

## 2018-01-01 PROCEDURE — 74011000250 HC RX REV CODE- 250: Performed by: INTERNAL MEDICINE

## 2018-01-01 PROCEDURE — 80048 BASIC METABOLIC PNL TOTAL CA: CPT | Performed by: INTERNAL MEDICINE

## 2018-01-01 PROCEDURE — 92610 EVALUATE SWALLOWING FUNCTION: CPT

## 2018-01-01 PROCEDURE — 77030002933 HC SUT MCRYL J&J -A: Performed by: SURGERY

## 2018-01-01 PROCEDURE — 88307 TISSUE EXAM BY PATHOLOGIST: CPT | Performed by: SURGERY

## 2018-01-01 PROCEDURE — 77030032490 HC SLV COMPR SCD KNE COVD -B: Performed by: SURGERY

## 2018-01-01 PROCEDURE — 77030000038 HC TIP SCIS LAPSCP SURI -B: Performed by: SURGERY

## 2018-01-01 PROCEDURE — 99218 HC RM OBSERVATION: CPT

## 2018-01-01 PROCEDURE — 82378 CARCINOEMBRYONIC ANTIGEN: CPT

## 2018-01-01 PROCEDURE — 74011250636 HC RX REV CODE- 250/636: Performed by: RADIOLOGY

## 2018-01-01 PROCEDURE — 77030033269 HC SLV COMPR SCD KNE2 CARD -B

## 2018-01-01 PROCEDURE — 77030008522 HC TBNG INSUF LAPRO STRY -B: Performed by: SURGERY

## 2018-01-01 PROCEDURE — 71260 CT THORAX DX C+: CPT

## 2018-01-01 PROCEDURE — 77010033678 HC OXYGEN DAILY

## 2018-01-01 PROCEDURE — 77030031139 HC SUT VCRL2 J&J -A: Performed by: SURGERY

## 2018-01-01 PROCEDURE — 78227 HEPATOBIL SYST IMAGE W/DRUG: CPT

## 2018-01-01 PROCEDURE — 85027 COMPLETE CBC AUTOMATED: CPT | Performed by: SURGERY

## 2018-01-01 PROCEDURE — 94664 DEMO&/EVAL PT USE INHALER: CPT

## 2018-01-01 PROCEDURE — 77030039266 HC ADH SKN EXOFIN S2SG -A: Performed by: SURGERY

## 2018-01-01 PROCEDURE — 88313 SPECIAL STAINS GROUP 2: CPT | Performed by: SURGERY

## 2018-01-01 PROCEDURE — 82565 ASSAY OF CREATININE: CPT

## 2018-01-01 PROCEDURE — 77030009403 HC ELECTRD ENDO MEGA -B: Performed by: SURGERY

## 2018-01-01 PROCEDURE — 77030011640 HC PAD GRND REM COVD -A: Performed by: SURGERY

## 2018-01-01 PROCEDURE — 77030032490 HC SLV COMPR SCD KNE COVD -B

## 2018-01-01 PROCEDURE — 77030008703 HC TU ET UNCUF COVD -A: Performed by: ANESTHESIOLOGY

## 2018-01-01 PROCEDURE — 8E0W4CZ ROBOTIC ASSISTED PROCEDURE OF TRUNK REGION, PERCUTANEOUS ENDOSCOPIC APPROACH: ICD-10-PCS | Performed by: SURGERY

## 2018-01-01 PROCEDURE — 77030020263 HC SOL INJ SOD CL0.9% LFCR 1000ML

## 2018-01-01 PROCEDURE — 74011000258 HC RX REV CODE- 258: Performed by: SURGERY

## 2018-01-01 PROCEDURE — 74022 RADEX COMPL AQT ABD SERIES: CPT

## 2018-01-01 PROCEDURE — 84132 ASSAY OF SERUM POTASSIUM: CPT | Performed by: ANESTHESIOLOGY

## 2018-01-01 PROCEDURE — 82565 ASSAY OF CREATININE: CPT | Performed by: ANESTHESIOLOGY

## 2018-01-01 PROCEDURE — 400013 HH SOC

## 2018-01-01 PROCEDURE — 77030010939 HC CLP LIG TELE -B: Performed by: SURGERY

## 2018-01-01 PROCEDURE — 77030008477 HC STYL SATN SLP COVD -A: Performed by: ANESTHESIOLOGY

## 2018-01-01 PROCEDURE — 0W9G4ZZ DRAINAGE OF PERITONEAL CAVITY, PERCUTANEOUS ENDOSCOPIC APPROACH: ICD-10-PCS | Performed by: SURGERY

## 2018-01-01 PROCEDURE — 77030029215 HC SUC IRRGTR DAVINCI SGL INTU -C: Performed by: SURGERY

## 2018-01-01 PROCEDURE — 77030003503 HC NDL BIOP TISS BD -B: Performed by: SURGERY

## 2018-01-01 PROCEDURE — 80053 COMPREHEN METABOLIC PANEL: CPT | Performed by: EMERGENCY MEDICINE

## 2018-01-01 PROCEDURE — 77030008771 HC TU NG SALEM SUMP -A: Performed by: ANESTHESIOLOGY

## 2018-01-01 PROCEDURE — 76040000025: Performed by: INTERNAL MEDICINE

## 2018-01-01 PROCEDURE — P9045 ALBUMIN (HUMAN), 5%, 250 ML: HCPCS

## 2018-01-01 PROCEDURE — 0W9G3ZZ DRAINAGE OF PERITONEAL CAVITY, PERCUTANEOUS APPROACH: ICD-10-PCS | Performed by: RADIOLOGY

## 2018-01-01 PROCEDURE — 88342 IMHCHEM/IMCYTCHM 1ST ANTB: CPT | Performed by: SURGERY

## 2018-01-01 PROCEDURE — 85025 COMPLETE CBC W/AUTO DIFF WBC: CPT | Performed by: EMERGENCY MEDICINE

## 2018-01-01 PROCEDURE — 83690 ASSAY OF LIPASE: CPT | Performed by: EMERGENCY MEDICINE

## 2018-01-01 PROCEDURE — 77030035045 HC TRCR ENDOSC VRSPRT BLDLSS COVD -B: Performed by: SURGERY

## 2018-01-01 PROCEDURE — 77030021158 HC TRCR BLN GELPRT AMR -B: Performed by: SURGERY

## 2018-01-01 PROCEDURE — 77030010522

## 2018-01-01 PROCEDURE — 82746 ASSAY OF FOLIC ACID SERUM: CPT

## 2018-01-01 PROCEDURE — 74011250636 HC RX REV CODE- 250/636: Performed by: INTERNAL MEDICINE

## 2018-01-01 PROCEDURE — C1729 CATH, DRAINAGE: HCPCS

## 2018-01-01 PROCEDURE — 83036 HEMOGLOBIN GLYCOSYLATED A1C: CPT | Performed by: SURGERY

## 2018-01-01 PROCEDURE — 77030035277 HC OBTRTR BLDELSS DISP INTU -B: Performed by: SURGERY

## 2018-01-01 PROCEDURE — 84484 ASSAY OF TROPONIN QUANT: CPT

## 2018-01-01 PROCEDURE — 84484 ASSAY OF TROPONIN QUANT: CPT | Performed by: EMERGENCY MEDICINE

## 2018-01-01 PROCEDURE — 83540 ASSAY OF IRON: CPT

## 2018-01-01 PROCEDURE — 88341 IMHCHEM/IMCYTCHM EA ADD ANTB: CPT | Performed by: SURGERY

## 2018-01-01 PROCEDURE — 76010000875 HC OR TIME 1.5 TO 2HR INTENSV - TIER 2: Performed by: SURGERY

## 2018-01-01 PROCEDURE — 81015 MICROSCOPIC EXAM OF URINE: CPT

## 2018-01-01 RX ORDER — TAMSULOSIN HYDROCHLORIDE 0.4 MG/1
0.4 CAPSULE ORAL DAILY
Status: DISCONTINUED | OUTPATIENT
Start: 2018-01-01 | End: 2018-01-01 | Stop reason: HOSPADM

## 2018-01-01 RX ORDER — MORPHINE SULFATE 4 MG/ML
4 INJECTION, SOLUTION INTRAMUSCULAR; INTRAVENOUS
Status: DISCONTINUED | OUTPATIENT
Start: 2018-01-01 | End: 2018-01-01

## 2018-01-01 RX ORDER — SODIUM CHLORIDE 0.9 % (FLUSH) 0.9 %
10 SYRINGE (ML) INJECTION
Status: COMPLETED | OUTPATIENT
Start: 2018-01-01 | End: 2018-01-01

## 2018-01-01 RX ORDER — OXYCODONE AND ACETAMINOPHEN 5; 325 MG/1; MG/1
2 TABLET ORAL
Status: DISCONTINUED | OUTPATIENT
Start: 2018-01-01 | End: 2018-01-01 | Stop reason: HOSPADM

## 2018-01-01 RX ORDER — SODIUM CHLORIDE, SODIUM LACTATE, POTASSIUM CHLORIDE, CALCIUM CHLORIDE 600; 310; 30; 20 MG/100ML; MG/100ML; MG/100ML; MG/100ML
INJECTION, SOLUTION INTRAVENOUS
Status: DISCONTINUED | OUTPATIENT
Start: 2018-01-01 | End: 2018-01-01 | Stop reason: HOSPADM

## 2018-01-01 RX ORDER — SODIUM CHLORIDE 0.9 % (FLUSH) 0.9 %
5-10 SYRINGE (ML) INJECTION AS NEEDED
Status: DISCONTINUED | OUTPATIENT
Start: 2018-01-01 | End: 2018-01-01 | Stop reason: HOSPADM

## 2018-01-01 RX ORDER — SODIUM CHLORIDE 0.9 % (FLUSH) 0.9 %
5-10 SYRINGE (ML) INJECTION EVERY 8 HOURS
Status: DISCONTINUED | OUTPATIENT
Start: 2018-01-01 | End: 2018-01-01 | Stop reason: HOSPADM

## 2018-01-01 RX ORDER — ALBUMIN HUMAN 50 G/1000ML
SOLUTION INTRAVENOUS AS NEEDED
Status: DISCONTINUED | OUTPATIENT
Start: 2018-01-01 | End: 2018-01-01 | Stop reason: HOSPADM

## 2018-01-01 RX ORDER — FAMOTIDINE 40 MG/1
40 TABLET, FILM COATED ORAL
Qty: 20 TAB | Refills: 0 | Status: SHIPPED | OUTPATIENT
Start: 2018-01-01 | End: 2018-01-01

## 2018-01-01 RX ORDER — PROPOFOL 10 MG/ML
INJECTION, EMULSION INTRAVENOUS
Status: DISCONTINUED | OUTPATIENT
Start: 2018-01-01 | End: 2018-01-01 | Stop reason: HOSPADM

## 2018-01-01 RX ORDER — NITROGLYCERIN 0.4 MG/1
0.4 TABLET SUBLINGUAL AS NEEDED
Status: DISCONTINUED | OUTPATIENT
Start: 2018-01-01 | End: 2018-01-01 | Stop reason: HOSPADM

## 2018-01-01 RX ORDER — NEOSTIGMINE METHYLSULFATE 1 MG/ML
INJECTION INTRAVENOUS AS NEEDED
Status: DISCONTINUED | OUTPATIENT
Start: 2018-01-01 | End: 2018-01-01 | Stop reason: HOSPADM

## 2018-01-01 RX ORDER — HYDROMORPHONE HYDROCHLORIDE 2 MG/ML
0.5 INJECTION, SOLUTION INTRAMUSCULAR; INTRAVENOUS; SUBCUTANEOUS
Status: DISCONTINUED | OUTPATIENT
Start: 2018-01-01 | End: 2018-01-01 | Stop reason: HOSPADM

## 2018-01-01 RX ORDER — GUAIFENESIN 100 MG/5ML
324 LIQUID (ML) ORAL
Status: COMPLETED | OUTPATIENT
Start: 2018-01-01 | End: 2018-01-01

## 2018-01-01 RX ORDER — PROPOFOL 10 MG/ML
INJECTION, EMULSION INTRAVENOUS AS NEEDED
Status: DISCONTINUED | OUTPATIENT
Start: 2018-01-01 | End: 2018-01-01 | Stop reason: HOSPADM

## 2018-01-01 RX ORDER — LIDOCAINE HYDROCHLORIDE 20 MG/ML
INJECTION, SOLUTION EPIDURAL; INFILTRATION; INTRACAUDAL; PERINEURAL AS NEEDED
Status: DISCONTINUED | OUTPATIENT
Start: 2018-01-01 | End: 2018-01-01 | Stop reason: HOSPADM

## 2018-01-01 RX ORDER — SUCRALFATE 1 G/10ML
1 SUSPENSION ORAL
Status: DISCONTINUED | OUTPATIENT
Start: 2018-01-01 | End: 2018-01-01 | Stop reason: HOSPADM

## 2018-01-01 RX ORDER — ASPIRIN 81 MG/1
81 TABLET ORAL DAILY
Status: DISCONTINUED | OUTPATIENT
Start: 2018-01-01 | End: 2018-01-01 | Stop reason: HOSPADM

## 2018-01-01 RX ORDER — HYDROMORPHONE HYDROCHLORIDE 2 MG/ML
0.5 INJECTION, SOLUTION INTRAMUSCULAR; INTRAVENOUS; SUBCUTANEOUS
Status: DISCONTINUED | OUTPATIENT
Start: 2018-01-01 | End: 2018-01-01

## 2018-01-01 RX ORDER — SULFAMETHOXAZOLE AND TRIMETHOPRIM 800; 160 MG/1; MG/1
1 TABLET ORAL EVERY 12 HOURS
Status: DISCONTINUED | OUTPATIENT
Start: 2018-01-01 | End: 2018-01-01

## 2018-01-01 RX ORDER — ENOXAPARIN SODIUM 100 MG/ML
40 INJECTION SUBCUTANEOUS EVERY 24 HOURS
Status: DISCONTINUED | OUTPATIENT
Start: 2018-01-01 | End: 2018-01-01 | Stop reason: HOSPADM

## 2018-01-01 RX ORDER — ZOLPIDEM TARTRATE 5 MG/1
5 TABLET ORAL
Status: DISCONTINUED | OUTPATIENT
Start: 2018-01-01 | End: 2018-01-01 | Stop reason: HOSPADM

## 2018-01-01 RX ORDER — MORPHINE SULFATE 100 MG/5ML
10 SOLUTION ORAL
Status: DISCONTINUED | OUTPATIENT
Start: 2018-01-01 | End: 2018-01-01 | Stop reason: HOSPADM

## 2018-01-01 RX ORDER — ISOSORBIDE MONONITRATE 30 MG/1
60 TABLET, EXTENDED RELEASE ORAL DAILY
Status: DISCONTINUED | OUTPATIENT
Start: 2018-01-01 | End: 2018-01-01 | Stop reason: HOSPADM

## 2018-01-01 RX ORDER — OXYCODONE AND ACETAMINOPHEN 5; 325 MG/1; MG/1
1 TABLET ORAL
Status: DISCONTINUED | OUTPATIENT
Start: 2018-01-01 | End: 2018-01-01 | Stop reason: HOSPADM

## 2018-01-01 RX ORDER — LORAZEPAM 2 MG/ML
2 INJECTION INTRAMUSCULAR EVERY 6 HOURS
Status: DISCONTINUED | OUTPATIENT
Start: 2018-01-01 | End: 2018-01-01 | Stop reason: HOSPADM

## 2018-01-01 RX ORDER — ENALAPRILAT 1.25 MG/ML
1.25 INJECTION INTRAVENOUS
Status: DISCONTINUED | OUTPATIENT
Start: 2018-01-01 | End: 2018-01-01 | Stop reason: HOSPADM

## 2018-01-01 RX ORDER — GLYCOPYRROLATE 0.2 MG/ML
INJECTION INTRAMUSCULAR; INTRAVENOUS AS NEEDED
Status: DISCONTINUED | OUTPATIENT
Start: 2018-01-01 | End: 2018-01-01 | Stop reason: HOSPADM

## 2018-01-01 RX ORDER — ALLOPURINOL 100 MG/1
300 TABLET ORAL
Status: DISCONTINUED | OUTPATIENT
Start: 2018-01-01 | End: 2018-01-01 | Stop reason: HOSPADM

## 2018-01-01 RX ORDER — SODIUM CHLORIDE AND POTASSIUM CHLORIDE .9; .15 G/100ML; G/100ML
SOLUTION INTRAVENOUS CONTINUOUS
Status: DISCONTINUED | OUTPATIENT
Start: 2018-01-01 | End: 2018-01-01

## 2018-01-01 RX ORDER — SODIUM CHLORIDE, SODIUM LACTATE, POTASSIUM CHLORIDE, CALCIUM CHLORIDE 600; 310; 30; 20 MG/100ML; MG/100ML; MG/100ML; MG/100ML
150 INJECTION, SOLUTION INTRAVENOUS CONTINUOUS
Status: DISCONTINUED | OUTPATIENT
Start: 2018-01-01 | End: 2018-01-01 | Stop reason: HOSPADM

## 2018-01-01 RX ORDER — MORPHINE SULFATE 100 MG/5ML
5 SOLUTION ORAL
Status: DISCONTINUED | OUTPATIENT
Start: 2018-01-01 | End: 2018-01-01 | Stop reason: HOSPADM

## 2018-01-01 RX ORDER — EPHEDRINE SULFATE 50 MG/ML
INJECTION, SOLUTION INTRAVENOUS AS NEEDED
Status: DISCONTINUED | OUTPATIENT
Start: 2018-01-01 | End: 2018-01-01 | Stop reason: HOSPADM

## 2018-01-01 RX ORDER — LIDOCAINE HYDROCHLORIDE 10 MG/ML
0.1 INJECTION INFILTRATION; PERINEURAL AS NEEDED
Status: DISCONTINUED | OUTPATIENT
Start: 2018-01-01 | End: 2018-01-01 | Stop reason: HOSPADM

## 2018-01-01 RX ORDER — BUPIVACAINE HYDROCHLORIDE 5 MG/ML
INJECTION, SOLUTION EPIDURAL; INTRACAUDAL AS NEEDED
Status: DISCONTINUED | OUTPATIENT
Start: 2018-01-01 | End: 2018-01-01 | Stop reason: HOSPADM

## 2018-01-01 RX ORDER — HALOPERIDOL 5 MG/ML
4 INJECTION INTRAMUSCULAR
Status: DISCONTINUED | OUTPATIENT
Start: 2018-01-01 | End: 2018-01-01 | Stop reason: HOSPADM

## 2018-01-01 RX ORDER — DIPHENHYDRAMINE HYDROCHLORIDE 50 MG/ML
12.5 INJECTION, SOLUTION INTRAMUSCULAR; INTRAVENOUS
Status: DISCONTINUED | OUTPATIENT
Start: 2018-01-01 | End: 2018-01-01 | Stop reason: HOSPADM

## 2018-01-01 RX ORDER — POLYETHYLENE GLYCOL 3350 17 G/17G
17 POWDER, FOR SOLUTION ORAL DAILY
Qty: 14 PACKET | Refills: 0 | Status: SHIPPED | OUTPATIENT
Start: 2018-01-01 | End: 2018-01-01

## 2018-01-01 RX ORDER — FACIAL-BODY WIPES
10 EACH TOPICAL AS NEEDED
Status: DISCONTINUED | OUTPATIENT
Start: 2018-01-01 | End: 2018-01-01 | Stop reason: HOSPADM

## 2018-01-01 RX ORDER — NALOXONE HYDROCHLORIDE 0.4 MG/ML
0.4 INJECTION, SOLUTION INTRAMUSCULAR; INTRAVENOUS; SUBCUTANEOUS AS NEEDED
Status: DISCONTINUED | OUTPATIENT
Start: 2018-01-01 | End: 2018-01-01 | Stop reason: HOSPADM

## 2018-01-01 RX ORDER — FAMOTIDINE 20 MG/1
40 TABLET, FILM COATED ORAL
Status: DISCONTINUED | OUTPATIENT
Start: 2018-01-01 | End: 2018-01-01 | Stop reason: HOSPADM

## 2018-01-01 RX ORDER — ALBUTEROL SULFATE 0.83 MG/ML
2.5 SOLUTION RESPIRATORY (INHALATION)
Status: DISCONTINUED | OUTPATIENT
Start: 2018-01-01 | End: 2018-01-01 | Stop reason: HOSPADM

## 2018-01-01 RX ORDER — FENTANYL CITRATE 50 UG/ML
100 INJECTION, SOLUTION INTRAMUSCULAR; INTRAVENOUS ONCE
Status: DISCONTINUED | OUTPATIENT
Start: 2018-01-01 | End: 2018-01-01 | Stop reason: HOSPADM

## 2018-01-01 RX ORDER — POLYETHYLENE GLYCOL 3350 17 G/17G
17 POWDER, FOR SOLUTION ORAL DAILY
Status: DISCONTINUED | OUTPATIENT
Start: 2018-01-01 | End: 2018-01-01 | Stop reason: HOSPADM

## 2018-01-01 RX ORDER — MAGNESIUM CITRATE
296 SOLUTION, ORAL ORAL
Status: COMPLETED | OUTPATIENT
Start: 2018-01-01 | End: 2018-01-01

## 2018-01-01 RX ORDER — METOPROLOL TARTRATE 50 MG/1
50 TABLET ORAL DAILY
Status: DISCONTINUED | OUTPATIENT
Start: 2018-01-01 | End: 2018-01-01 | Stop reason: HOSPADM

## 2018-01-01 RX ORDER — FENOFIBRATE 160 MG/1
160 TABLET ORAL DAILY
Status: DISCONTINUED | OUTPATIENT
Start: 2018-01-01 | End: 2018-01-01 | Stop reason: HOSPADM

## 2018-01-01 RX ORDER — ACETAMINOPHEN 500 MG
1000 TABLET ORAL
Status: DISCONTINUED | OUTPATIENT
Start: 2018-01-01 | End: 2018-01-01 | Stop reason: HOSPADM

## 2018-01-01 RX ORDER — CEFAZOLIN SODIUM/WATER 2 G/20 ML
2 SYRINGE (ML) INTRAVENOUS ONCE
Status: COMPLETED | OUTPATIENT
Start: 2018-01-01 | End: 2018-01-01

## 2018-01-01 RX ORDER — PANTOPRAZOLE SODIUM 40 MG/1
40 TABLET, DELAYED RELEASE ORAL
Status: DISCONTINUED | OUTPATIENT
Start: 2018-01-01 | End: 2018-01-01 | Stop reason: HOSPADM

## 2018-01-01 RX ORDER — LIDOCAINE HYDROCHLORIDE 10 MG/ML
1-10 INJECTION INFILTRATION; PERINEURAL ONCE
Status: COMPLETED | OUTPATIENT
Start: 2018-01-01 | End: 2018-01-01

## 2018-01-01 RX ORDER — POTASSIUM CHLORIDE 750 MG/1
10 TABLET, EXTENDED RELEASE ORAL DAILY
Status: DISCONTINUED | OUTPATIENT
Start: 2018-01-01 | End: 2018-01-01 | Stop reason: HOSPADM

## 2018-01-01 RX ORDER — FINASTERIDE 5 MG/1
5 TABLET, FILM COATED ORAL
Status: DISCONTINUED | OUTPATIENT
Start: 2018-01-01 | End: 2018-01-01 | Stop reason: HOSPADM

## 2018-01-01 RX ORDER — OXYCODONE AND ACETAMINOPHEN 5; 325 MG/1; MG/1
1 TABLET ORAL
Qty: 30 TAB | Refills: 0 | Status: SHIPPED
Start: 2018-01-01 | End: 2018-01-01

## 2018-01-01 RX ORDER — NYSTATIN 100000 [USP'U]/G
POWDER TOPICAL 2 TIMES DAILY
Status: DISCONTINUED | OUTPATIENT
Start: 2018-01-01 | End: 2018-01-01 | Stop reason: HOSPADM

## 2018-01-01 RX ORDER — ADHESIVE BANDAGE
30 BANDAGE TOPICAL DAILY
Status: DISCONTINUED | OUTPATIENT
Start: 2018-01-01 | End: 2018-01-01 | Stop reason: HOSPADM

## 2018-01-01 RX ORDER — FACIAL-BODY WIPES
10 EACH TOPICAL DAILY PRN
Status: DISCONTINUED | OUTPATIENT
Start: 2018-01-01 | End: 2018-01-01 | Stop reason: HOSPADM

## 2018-01-01 RX ORDER — FELODIPINE 10 MG/1
10 TABLET, EXTENDED RELEASE ORAL DAILY
Status: DISCONTINUED | OUTPATIENT
Start: 2018-01-01 | End: 2018-01-01 | Stop reason: HOSPADM

## 2018-01-01 RX ORDER — ONDANSETRON HYDROCHLORIDE 8 MG/1
8 TABLET, FILM COATED ORAL
Qty: 15 TAB | Refills: 0 | Status: SHIPPED | OUTPATIENT
Start: 2018-01-01 | End: 2018-01-01

## 2018-01-01 RX ORDER — ONDANSETRON 4 MG/1
4 TABLET, ORALLY DISINTEGRATING ORAL
Status: DISCONTINUED | OUTPATIENT
Start: 2018-01-01 | End: 2018-01-01 | Stop reason: HOSPADM

## 2018-01-01 RX ORDER — ONDANSETRON 2 MG/ML
4 INJECTION INTRAMUSCULAR; INTRAVENOUS
Status: DISCONTINUED | OUTPATIENT
Start: 2018-01-01 | End: 2018-01-01 | Stop reason: HOSPADM

## 2018-01-01 RX ORDER — MIDAZOLAM HYDROCHLORIDE 1 MG/ML
2 INJECTION, SOLUTION INTRAMUSCULAR; INTRAVENOUS
Status: DISCONTINUED | OUTPATIENT
Start: 2018-01-01 | End: 2018-01-01 | Stop reason: HOSPADM

## 2018-01-01 RX ORDER — LORAZEPAM 2 MG/ML
2 INJECTION INTRAMUSCULAR
Status: DISCONTINUED | OUTPATIENT
Start: 2018-01-01 | End: 2018-01-01 | Stop reason: HOSPADM

## 2018-01-01 RX ORDER — HYDROCODONE BITARTRATE AND ACETAMINOPHEN 5; 325 MG/1; MG/1
1 TABLET ORAL AS NEEDED
Status: DISCONTINUED | OUTPATIENT
Start: 2018-01-01 | End: 2018-01-01 | Stop reason: HOSPADM

## 2018-01-01 RX ORDER — SODIUM CHLORIDE 0.9 % (FLUSH) 0.9 %
5-10 SYRINGE (ML) INJECTION AS NEEDED
Status: CANCELLED | OUTPATIENT
Start: 2018-01-01

## 2018-01-01 RX ORDER — ACETAMINOPHEN 650 MG/1
650 SUPPOSITORY RECTAL
Status: DISCONTINUED | OUTPATIENT
Start: 2018-01-01 | End: 2018-01-01 | Stop reason: HOSPADM

## 2018-01-01 RX ORDER — MORPHINE SULFATE 100 MG/5ML
6 SOLUTION ORAL
Status: DISCONTINUED | OUTPATIENT
Start: 2018-01-01 | End: 2018-01-01

## 2018-01-01 RX ORDER — ASPIRIN 81 MG/1
81 TABLET ORAL DAILY
COMMUNITY
End: 2018-01-01

## 2018-01-01 RX ORDER — LORAZEPAM 2 MG/ML
1 INJECTION INTRAMUSCULAR
Status: DISCONTINUED | OUTPATIENT
Start: 2018-01-01 | End: 2018-01-01 | Stop reason: HOSPADM

## 2018-01-01 RX ORDER — MORPHINE SULFATE 10 MG/ML
6 INJECTION, SOLUTION INTRAMUSCULAR; INTRAVENOUS
Status: DISCONTINUED | OUTPATIENT
Start: 2018-01-01 | End: 2018-01-01 | Stop reason: HOSPADM

## 2018-01-01 RX ORDER — SODIUM CHLORIDE 9 MG/ML
50 INJECTION, SOLUTION INTRAVENOUS CONTINUOUS
Status: DISCONTINUED | OUTPATIENT
Start: 2018-01-01 | End: 2018-01-01 | Stop reason: HOSPADM

## 2018-01-01 RX ORDER — GLYCOPYRROLATE 0.2 MG/ML
0.2 INJECTION INTRAMUSCULAR; INTRAVENOUS
Status: DISCONTINUED | OUTPATIENT
Start: 2018-01-01 | End: 2018-01-01 | Stop reason: HOSPADM

## 2018-01-01 RX ORDER — HYDROMORPHONE HYDROCHLORIDE 2 MG/ML
1 INJECTION, SOLUTION INTRAMUSCULAR; INTRAVENOUS; SUBCUTANEOUS
Status: DISCONTINUED | OUTPATIENT
Start: 2018-01-01 | End: 2018-01-01 | Stop reason: HOSPADM

## 2018-01-01 RX ORDER — MORPHINE SULFATE 100 MG/5ML
10 SOLUTION ORAL
Qty: 20 ML | Refills: 0 | Status: SHIPPED
Start: 2018-01-01 | End: 2018-01-01

## 2018-01-01 RX ORDER — ROCURONIUM BROMIDE 10 MG/ML
INJECTION, SOLUTION INTRAVENOUS AS NEEDED
Status: DISCONTINUED | OUTPATIENT
Start: 2018-01-01 | End: 2018-01-01 | Stop reason: HOSPADM

## 2018-01-01 RX ORDER — FAMOTIDINE 20 MG/1
20 TABLET, FILM COATED ORAL ONCE
Status: COMPLETED | OUTPATIENT
Start: 2018-01-01 | End: 2018-01-01

## 2018-01-01 RX ORDER — SODIUM CHLORIDE, SODIUM LACTATE, POTASSIUM CHLORIDE, CALCIUM CHLORIDE 600; 310; 30; 20 MG/100ML; MG/100ML; MG/100ML; MG/100ML
100 INJECTION, SOLUTION INTRAVENOUS CONTINUOUS
Status: CANCELLED | OUTPATIENT
Start: 2018-01-01

## 2018-01-01 RX ORDER — ONDANSETRON 2 MG/ML
INJECTION INTRAMUSCULAR; INTRAVENOUS AS NEEDED
Status: DISCONTINUED | OUTPATIENT
Start: 2018-01-01 | End: 2018-01-01 | Stop reason: HOSPADM

## 2018-01-01 RX ORDER — FENTANYL CITRATE 50 UG/ML
INJECTION, SOLUTION INTRAMUSCULAR; INTRAVENOUS AS NEEDED
Status: DISCONTINUED | OUTPATIENT
Start: 2018-01-01 | End: 2018-01-01 | Stop reason: HOSPADM

## 2018-01-01 RX ORDER — LATANOPROST 50 UG/ML
1 SOLUTION/ DROPS OPHTHALMIC
Status: DISCONTINUED | OUTPATIENT
Start: 2018-01-01 | End: 2018-01-01 | Stop reason: HOSPADM

## 2018-01-01 RX ORDER — HALOPERIDOL 5 MG/ML
2 INJECTION INTRAMUSCULAR
Status: DISCONTINUED | OUTPATIENT
Start: 2018-01-01 | End: 2018-01-01

## 2018-01-01 RX ORDER — LISINOPRIL AND HYDROCHLOROTHIAZIDE 20; 25 MG/1; MG/1
1 TABLET ORAL DAILY
Status: DISCONTINUED | OUTPATIENT
Start: 2018-01-01 | End: 2018-01-01 | Stop reason: HOSPADM

## 2018-01-01 RX ORDER — METOPROLOL TARTRATE 5 MG/5ML
1.25 INJECTION INTRAVENOUS
Status: DISCONTINUED | OUTPATIENT
Start: 2018-01-01 | End: 2018-01-01 | Stop reason: HOSPADM

## 2018-01-01 RX ORDER — ZOLPIDEM TARTRATE 5 MG/1
5 TABLET ORAL
Qty: 30 TAB | Refills: 0 | Status: SHIPPED
Start: 2018-01-01 | End: 2018-01-01

## 2018-01-01 RX ORDER — ROSUVASTATIN CALCIUM 20 MG/1
20 TABLET, COATED ORAL
Status: DISCONTINUED | OUTPATIENT
Start: 2018-01-01 | End: 2018-01-01 | Stop reason: HOSPADM

## 2018-01-01 RX ADMIN — MORPHINE SULFATE 4 MG: 4 INJECTION, SOLUTION INTRAMUSCULAR; INTRAVENOUS at 07:04

## 2018-01-01 RX ADMIN — ALBUMIN HUMAN 500 ML: 50 SOLUTION INTRAVENOUS at 08:45

## 2018-01-01 RX ADMIN — HYDROMORPHONE HYDROCHLORIDE 1 MG: 2 INJECTION, SOLUTION INTRAMUSCULAR; INTRAVENOUS; SUBCUTANEOUS at 12:59

## 2018-01-01 RX ADMIN — NEOSTIGMINE METHYLSULFATE 5 MG: 1 INJECTION INTRAVENOUS at 09:24

## 2018-01-01 RX ADMIN — Medication 10 ML: at 21:53

## 2018-01-01 RX ADMIN — OXYCODONE HYDROCHLORIDE AND ACETAMINOPHEN 2 TABLET: 5; 325 TABLET ORAL at 12:28

## 2018-01-01 RX ADMIN — HALOPERIDOL LACTATE 4 MG: 5 INJECTION, SOLUTION INTRAMUSCULAR at 18:18

## 2018-01-01 RX ADMIN — Medication 5 ML: at 04:06

## 2018-01-01 RX ADMIN — Medication 10 ML: at 22:59

## 2018-01-01 RX ADMIN — MORPHINE SULFATE 4 MG: 4 INJECTION, SOLUTION INTRAMUSCULAR; INTRAVENOUS at 04:41

## 2018-01-01 RX ADMIN — ROCURONIUM BROMIDE 10 MG: 10 INJECTION, SOLUTION INTRAVENOUS at 08:53

## 2018-01-01 RX ADMIN — TAMSULOSIN HYDROCHLORIDE 0.4 MG: 0.4 CAPSULE ORAL at 09:29

## 2018-01-01 RX ADMIN — OXYCODONE HYDROCHLORIDE AND ACETAMINOPHEN 1 TABLET: 5; 325 TABLET ORAL at 15:15

## 2018-01-01 RX ADMIN — MORPHINE SULFATE 4 MG: 4 INJECTION, SOLUTION INTRAMUSCULAR; INTRAVENOUS at 06:41

## 2018-01-01 RX ADMIN — ALBUTEROL SULFATE 2.5 MG: 2.5 SOLUTION RESPIRATORY (INHALATION) at 08:06

## 2018-01-01 RX ADMIN — HALOPERIDOL LACTATE 4 MG: 5 INJECTION, SOLUTION INTRAMUSCULAR at 07:04

## 2018-01-01 RX ADMIN — HALOPERIDOL LACTATE 4 MG: 5 INJECTION, SOLUTION INTRAMUSCULAR at 16:24

## 2018-01-01 RX ADMIN — ALLOPURINOL 300 MG: 100 TABLET ORAL at 21:51

## 2018-01-01 RX ADMIN — MORPHINE SULFATE 4 MG: 4 INJECTION, SOLUTION INTRAMUSCULAR; INTRAVENOUS at 19:31

## 2018-01-01 RX ADMIN — Medication 2 G: at 08:06

## 2018-01-01 RX ADMIN — HYDROMORPHONE HYDROCHLORIDE 1 MG: 2 INJECTION, SOLUTION INTRAMUSCULAR; INTRAVENOUS; SUBCUTANEOUS at 20:11

## 2018-01-01 RX ADMIN — PROPOFOL 150 MG: 10 INJECTION, EMULSION INTRAVENOUS at 08:08

## 2018-01-01 RX ADMIN — ALBUTEROL SULFATE 2.5 MG: 2.5 SOLUTION RESPIRATORY (INHALATION) at 08:07

## 2018-01-01 RX ADMIN — PROMETHAZINE HYDROCHLORIDE 12.5 MG: 25 INJECTION INTRAMUSCULAR; INTRAVENOUS at 01:56

## 2018-01-01 RX ADMIN — Medication 5 ML: at 04:04

## 2018-01-01 RX ADMIN — DRONEDARONE 400 MG: 400 TABLET, FILM COATED ORAL at 09:29

## 2018-01-01 RX ADMIN — Medication 5 ML: at 14:00

## 2018-01-01 RX ADMIN — HALOPERIDOL LACTATE 4 MG: 5 INJECTION, SOLUTION INTRAMUSCULAR at 09:03

## 2018-01-01 RX ADMIN — HYDROMORPHONE HYDROCHLORIDE 1 MG: 2 INJECTION, SOLUTION INTRAMUSCULAR; INTRAVENOUS; SUBCUTANEOUS at 16:27

## 2018-01-01 RX ADMIN — DRONEDARONE 400 MG: 400 TABLET, FILM COATED ORAL at 10:09

## 2018-01-01 RX ADMIN — GLYCOPYRROLATE 0.2 MG: 0.2 INJECTION, SOLUTION INTRAMUSCULAR; INTRAVENOUS at 01:38

## 2018-01-01 RX ADMIN — SODIUM CHLORIDE, SODIUM LACTATE, POTASSIUM CHLORIDE, AND CALCIUM CHLORIDE: 600; 310; 30; 20 INJECTION, SOLUTION INTRAVENOUS at 08:15

## 2018-01-01 RX ADMIN — TAMSULOSIN HYDROCHLORIDE 0.4 MG: 0.4 CAPSULE ORAL at 08:45

## 2018-01-01 RX ADMIN — DRONEDARONE 400 MG: 400 TABLET, FILM COATED ORAL at 18:05

## 2018-01-01 RX ADMIN — SODIUM CHLORIDE, SODIUM LACTATE, POTASSIUM CHLORIDE, CALCIUM CHLORIDE: 600; 310; 30; 20 INJECTION, SOLUTION INTRAVENOUS at 10:16

## 2018-01-01 RX ADMIN — FAMOTIDINE 20 MG: 20 TABLET, FILM COATED ORAL at 07:58

## 2018-01-01 RX ADMIN — HYDROMORPHONE HYDROCHLORIDE 1 MG: 2 INJECTION, SOLUTION INTRAMUSCULAR; INTRAVENOUS; SUBCUTANEOUS at 08:02

## 2018-01-01 RX ADMIN — FINASTERIDE 5 MG: 5 TABLET, FILM COATED ORAL at 22:53

## 2018-01-01 RX ADMIN — SODIUM CHLORIDE AND POTASSIUM CHLORIDE: 9; 1.49 INJECTION, SOLUTION INTRAVENOUS at 13:09

## 2018-01-01 RX ADMIN — ENOXAPARIN SODIUM 40 MG: 40 INJECTION, SOLUTION INTRAVENOUS; SUBCUTANEOUS at 21:14

## 2018-01-01 RX ADMIN — ASPIRIN 81 MG 324 MG: 81 TABLET ORAL at 00:42

## 2018-01-01 RX ADMIN — ASPIRIN 81 MG: 81 TABLET, COATED ORAL at 10:09

## 2018-01-01 RX ADMIN — SULFAMETHOXAZOLE AND TRIMETHOPRIM 1 TABLET: 800; 160 TABLET ORAL at 21:53

## 2018-01-01 RX ADMIN — SULFAMETHOXAZOLE AND TRIMETHOPRIM 1 TABLET: 800; 160 TABLET ORAL at 20:59

## 2018-01-01 RX ADMIN — ISOSORBIDE MONONITRATE 60 MG: 30 TABLET, EXTENDED RELEASE ORAL at 09:29

## 2018-01-01 RX ADMIN — WATER 10 MG: 1 INJECTION INTRAMUSCULAR; INTRAVENOUS; SUBCUTANEOUS at 07:05

## 2018-01-01 RX ADMIN — ALLOPURINOL 300 MG: 100 TABLET ORAL at 20:27

## 2018-01-01 RX ADMIN — HYDROMORPHONE HYDROCHLORIDE 1 MG: 2 INJECTION, SOLUTION INTRAMUSCULAR; INTRAVENOUS; SUBCUTANEOUS at 04:03

## 2018-01-01 RX ADMIN — FAMOTIDINE 40 MG: 20 TABLET ORAL at 22:09

## 2018-01-01 RX ADMIN — DRONEDARONE 400 MG: 400 TABLET, FILM COATED ORAL at 09:22

## 2018-01-01 RX ADMIN — POTASSIUM CHLORIDE 10 MEQ: 10 TABLET, EXTENDED RELEASE ORAL at 08:00

## 2018-01-01 RX ADMIN — HYDROMORPHONE HYDROCHLORIDE 0.5 MG: 2 INJECTION, SOLUTION INTRAMUSCULAR; INTRAVENOUS; SUBCUTANEOUS at 12:25

## 2018-01-01 RX ADMIN — ZOLPIDEM TARTRATE 5 MG: 5 TABLET ORAL at 01:12

## 2018-01-01 RX ADMIN — ASPIRIN 81 MG: 81 TABLET, COATED ORAL at 08:00

## 2018-01-01 RX ADMIN — MORPHINE SULFATE 4 MG: 4 INJECTION, SOLUTION INTRAMUSCULAR; INTRAVENOUS at 21:52

## 2018-01-01 RX ADMIN — Medication 10 ML: at 16:59

## 2018-01-01 RX ADMIN — ALBUTEROL SULFATE 2.5 MG: 2.5 SOLUTION RESPIRATORY (INHALATION) at 12:13

## 2018-01-01 RX ADMIN — TAMSULOSIN HYDROCHLORIDE 0.4 MG: 0.4 CAPSULE ORAL at 08:32

## 2018-01-01 RX ADMIN — LISINOPRIL AND HYDROCHLOROTHIAZIDE 1 TABLET: 25; 20 TABLET ORAL at 08:32

## 2018-01-01 RX ADMIN — MORPHINE SULFATE 4 MG: 4 INJECTION, SOLUTION INTRAMUSCULAR; INTRAVENOUS at 17:31

## 2018-01-01 RX ADMIN — Medication 10 ML: at 10:16

## 2018-01-01 RX ADMIN — Medication 10 ML: at 21:03

## 2018-01-01 RX ADMIN — HYDROMORPHONE HYDROCHLORIDE 0.5 MG: 2 INJECTION, SOLUTION INTRAMUSCULAR; INTRAVENOUS; SUBCUTANEOUS at 09:51

## 2018-01-01 RX ADMIN — ONDANSETRON 4 MG: 2 INJECTION INTRAMUSCULAR; INTRAVENOUS at 08:51

## 2018-01-01 RX ADMIN — NYSTATIN 1 UNITS: 100000 POWDER TOPICAL at 18:57

## 2018-01-01 RX ADMIN — LATANOPROST 1 DROP: 50 SOLUTION OPHTHALMIC at 19:00

## 2018-01-01 RX ADMIN — LORAZEPAM 1 MG: 2 INJECTION INTRAMUSCULAR; INTRAVENOUS at 03:46

## 2018-01-01 RX ADMIN — SODIUM CHLORIDE AND POTASSIUM CHLORIDE: 9; 1.49 INJECTION, SOLUTION INTRAVENOUS at 07:26

## 2018-01-01 RX ADMIN — DRONEDARONE 400 MG: 400 TABLET, FILM COATED ORAL at 08:45

## 2018-01-01 RX ADMIN — Medication 10 ML: at 06:33

## 2018-01-01 RX ADMIN — WATER 10 MG: 1 INJECTION INTRAMUSCULAR; INTRAVENOUS; SUBCUTANEOUS at 13:52

## 2018-01-01 RX ADMIN — FINASTERIDE 5 MG: 5 TABLET, FILM COATED ORAL at 21:12

## 2018-01-01 RX ADMIN — Medication 10 ML: at 18:48

## 2018-01-01 RX ADMIN — HYDROMORPHONE HYDROCHLORIDE 0.5 MG: 2 INJECTION, SOLUTION INTRAMUSCULAR; INTRAVENOUS; SUBCUTANEOUS at 04:24

## 2018-01-01 RX ADMIN — MORPHINE SULFATE 4 MG: 4 INJECTION, SOLUTION INTRAMUSCULAR; INTRAVENOUS at 07:05

## 2018-01-01 RX ADMIN — HALOPERIDOL LACTATE 4 MG: 5 INJECTION, SOLUTION INTRAMUSCULAR at 01:26

## 2018-01-01 RX ADMIN — IOPAMIDOL 100 ML: 755 INJECTION, SOLUTION INTRAVENOUS at 18:48

## 2018-01-01 RX ADMIN — HALOPERIDOL LACTATE 2 MG: 5 INJECTION, SOLUTION INTRAMUSCULAR at 14:18

## 2018-01-01 RX ADMIN — OXYCODONE HYDROCHLORIDE AND ACETAMINOPHEN 2 TABLET: 5; 325 TABLET ORAL at 16:20

## 2018-01-01 RX ADMIN — FENTANYL CITRATE 50 MCG: 50 INJECTION, SOLUTION INTRAMUSCULAR; INTRAVENOUS at 08:06

## 2018-01-01 RX ADMIN — PROPOFOL 60 MG: 10 INJECTION, EMULSION INTRAVENOUS at 10:21

## 2018-01-01 RX ADMIN — POTASSIUM CHLORIDE 10 MEQ: 10 TABLET, EXTENDED RELEASE ORAL at 09:29

## 2018-01-01 RX ADMIN — ALBUTEROL SULFATE 2.5 MG: 2.5 SOLUTION RESPIRATORY (INHALATION) at 20:13

## 2018-01-01 RX ADMIN — FINASTERIDE 5 MG: 5 TABLET, FILM COATED ORAL at 21:51

## 2018-01-01 RX ADMIN — ENOXAPARIN SODIUM 40 MG: 40 INJECTION, SOLUTION INTRAVENOUS; SUBCUTANEOUS at 21:51

## 2018-01-01 RX ADMIN — EPHEDRINE SULFATE 10 MG: 50 INJECTION, SOLUTION INTRAVENOUS at 08:17

## 2018-01-01 RX ADMIN — MORPHINE SULFATE 4 MG: 4 INJECTION, SOLUTION INTRAMUSCULAR; INTRAVENOUS at 20:46

## 2018-01-01 RX ADMIN — WATER 10 MG: 1 INJECTION INTRAMUSCULAR; INTRAVENOUS; SUBCUTANEOUS at 21:49

## 2018-01-01 RX ADMIN — FENOFIBRATE 160 MG: 160 TABLET ORAL at 08:45

## 2018-01-01 RX ADMIN — ALBUTEROL SULFATE 2.5 MG: 2.5 SOLUTION RESPIRATORY (INHALATION) at 08:25

## 2018-01-01 RX ADMIN — ENOXAPARIN SODIUM 40 MG: 40 INJECTION, SOLUTION INTRAVENOUS; SUBCUTANEOUS at 22:54

## 2018-01-01 RX ADMIN — MORPHINE SULFATE 4 MG: 4 INJECTION, SOLUTION INTRAMUSCULAR; INTRAVENOUS at 19:17

## 2018-01-01 RX ADMIN — SULFAMETHOXAZOLE AND TRIMETHOPRIM 1 TABLET: 800; 160 TABLET ORAL at 12:07

## 2018-01-01 RX ADMIN — Medication 5 ML: at 03:49

## 2018-01-01 RX ADMIN — SUCRALFATE 1 G: 1 SUSPENSION ORAL at 06:33

## 2018-01-01 RX ADMIN — HYDROMORPHONE HYDROCHLORIDE 0.5 MG: 2 INJECTION, SOLUTION INTRAMUSCULAR; INTRAVENOUS; SUBCUTANEOUS at 11:19

## 2018-01-01 RX ADMIN — ROSUVASTATIN CALCIUM 20 MG: 20 TABLET, FILM COATED ORAL at 22:53

## 2018-01-01 RX ADMIN — MORPHINE SULFATE 4 MG: 4 INJECTION, SOLUTION INTRAMUSCULAR; INTRAVENOUS at 08:36

## 2018-01-01 RX ADMIN — FENOFIBRATE 160 MG: 160 TABLET ORAL at 09:29

## 2018-01-01 RX ADMIN — MORPHINE SULFATE 4 MG: 4 INJECTION, SOLUTION INTRAMUSCULAR; INTRAVENOUS at 04:43

## 2018-01-01 RX ADMIN — LATANOPROST 1 DROP: 50 SOLUTION OPHTHALMIC at 21:13

## 2018-01-01 RX ADMIN — FAMOTIDINE 40 MG: 20 TABLET ORAL at 22:53

## 2018-01-01 RX ADMIN — HALOPERIDOL LACTATE 4 MG: 5 INJECTION, SOLUTION INTRAMUSCULAR at 19:17

## 2018-01-01 RX ADMIN — METOPROLOL TARTRATE 50 MG: 50 TABLET ORAL at 09:29

## 2018-01-01 RX ADMIN — PROPOFOL 200 MCG/KG/MIN: 10 INJECTION, EMULSION INTRAVENOUS at 10:21

## 2018-01-01 RX ADMIN — SODIUM CHLORIDE, SODIUM LACTATE, POTASSIUM CHLORIDE, AND CALCIUM CHLORIDE: 600; 310; 30; 20 INJECTION, SOLUTION INTRAVENOUS at 09:23

## 2018-01-01 RX ADMIN — Medication 10 ML: at 22:10

## 2018-01-01 RX ADMIN — FENOFIBRATE 160 MG: 160 TABLET ORAL at 08:00

## 2018-01-01 RX ADMIN — EPHEDRINE SULFATE 10 MG: 50 INJECTION, SOLUTION INTRAVENOUS at 09:00

## 2018-01-01 RX ADMIN — ROSUVASTATIN CALCIUM 20 MG: 20 TABLET, FILM COATED ORAL at 21:11

## 2018-01-01 RX ADMIN — HYDROMORPHONE HYDROCHLORIDE 0.5 MG: 2 INJECTION, SOLUTION INTRAMUSCULAR; INTRAVENOUS; SUBCUTANEOUS at 22:49

## 2018-01-01 RX ADMIN — LIDOCAINE HYDROCHLORIDE 5 ML: 10 INJECTION, SOLUTION INFILTRATION; PERINEURAL at 08:58

## 2018-01-01 RX ADMIN — ALBUTEROL SULFATE 2.5 MG: 2.5 SOLUTION RESPIRATORY (INHALATION) at 20:11

## 2018-01-01 RX ADMIN — MORPHINE SULFATE 4 MG: 4 INJECTION, SOLUTION INTRAMUSCULAR; INTRAVENOUS at 16:24

## 2018-01-01 RX ADMIN — ALBUTEROL SULFATE 2.5 MG: 2.5 SOLUTION RESPIRATORY (INHALATION) at 07:31

## 2018-01-01 RX ADMIN — SULFAMETHOXAZOLE AND TRIMETHOPRIM 1 TABLET: 800; 160 TABLET ORAL at 12:23

## 2018-01-01 RX ADMIN — SUCRALFATE 1 G: 1 SUSPENSION ORAL at 21:25

## 2018-01-01 RX ADMIN — Medication 10 ML: at 14:00

## 2018-01-01 RX ADMIN — OXYCODONE HYDROCHLORIDE AND ACETAMINOPHEN 1 TABLET: 5; 325 TABLET ORAL at 12:06

## 2018-01-01 RX ADMIN — ALLOPURINOL 300 MG: 100 TABLET ORAL at 22:45

## 2018-01-01 RX ADMIN — LISINOPRIL AND HYDROCHLOROTHIAZIDE 1 TABLET: 25; 20 TABLET ORAL at 09:29

## 2018-01-01 RX ADMIN — ALLOPURINOL 300 MG: 100 TABLET ORAL at 22:53

## 2018-01-01 RX ADMIN — Medication 10 ML: at 21:28

## 2018-01-01 RX ADMIN — ROCURONIUM BROMIDE 30 MG: 10 INJECTION, SOLUTION INTRAVENOUS at 08:08

## 2018-01-01 RX ADMIN — SODIUM CHLORIDE 100 ML: 900 INJECTION, SOLUTION INTRAVENOUS at 18:48

## 2018-01-01 RX ADMIN — NYSTATIN: 100000 POWDER TOPICAL at 08:36

## 2018-01-01 RX ADMIN — MORPHINE SULFATE 4 MG: 4 INJECTION, SOLUTION INTRAMUSCULAR; INTRAVENOUS at 13:48

## 2018-01-01 RX ADMIN — ROSUVASTATIN CALCIUM 20 MG: 20 TABLET, FILM COATED ORAL at 22:45

## 2018-01-01 RX ADMIN — Medication 10 ML: at 21:16

## 2018-01-01 RX ADMIN — HALOPERIDOL LACTATE 4 MG: 5 INJECTION, SOLUTION INTRAMUSCULAR at 23:50

## 2018-01-01 RX ADMIN — FINASTERIDE 5 MG: 5 TABLET, FILM COATED ORAL at 22:47

## 2018-01-01 RX ADMIN — ENOXAPARIN SODIUM 40 MG: 40 INJECTION, SOLUTION INTRAVENOUS; SUBCUTANEOUS at 22:09

## 2018-01-01 RX ADMIN — EPHEDRINE SULFATE 10 MG: 50 INJECTION, SOLUTION INTRAVENOUS at 08:20

## 2018-01-01 RX ADMIN — METOPROLOL TARTRATE 50 MG: 50 TABLET ORAL at 08:33

## 2018-01-01 RX ADMIN — HALOPERIDOL LACTATE 4 MG: 5 INJECTION, SOLUTION INTRAMUSCULAR at 04:43

## 2018-01-01 RX ADMIN — MORPHINE SULFATE 4 MG: 4 INJECTION, SOLUTION INTRAMUSCULAR; INTRAVENOUS at 16:15

## 2018-01-01 RX ADMIN — MORPHINE SULFATE 4 MG: 4 INJECTION, SOLUTION INTRAMUSCULAR; INTRAVENOUS at 09:56

## 2018-01-01 RX ADMIN — SINCALIDE 2.22 MCG: 5 INJECTION, POWDER, LYOPHILIZED, FOR SOLUTION INTRAVENOUS at 11:30

## 2018-01-01 RX ADMIN — LATANOPROST 1 DROP: 50 SOLUTION OPHTHALMIC at 22:54

## 2018-01-01 RX ADMIN — MORPHINE SULFATE 4 MG: 4 INJECTION, SOLUTION INTRAMUSCULAR; INTRAVENOUS at 05:40

## 2018-01-01 RX ADMIN — GLYCOPYRROLATE 0.2 MG: 0.2 INJECTION, SOLUTION INTRAMUSCULAR; INTRAVENOUS at 05:43

## 2018-01-01 RX ADMIN — LORAZEPAM 2 MG: 2 INJECTION INTRAMUSCULAR; INTRAVENOUS at 12:59

## 2018-01-01 RX ADMIN — MORPHINE SULFATE 4 MG: 4 INJECTION, SOLUTION INTRAMUSCULAR; INTRAVENOUS at 18:56

## 2018-01-01 RX ADMIN — Medication 5 ML: at 16:00

## 2018-01-01 RX ADMIN — LIDOCAINE HYDROCHLORIDE 100 MG: 20 INJECTION, SOLUTION EPIDURAL; INFILTRATION; INTRACAUDAL; PERINEURAL at 08:08

## 2018-01-01 RX ADMIN — TAMSULOSIN HYDROCHLORIDE 0.4 MG: 0.4 CAPSULE ORAL at 08:00

## 2018-01-01 RX ADMIN — Medication 5 ML: at 04:07

## 2018-01-01 RX ADMIN — FAMOTIDINE 40 MG: 20 TABLET ORAL at 21:50

## 2018-01-01 RX ADMIN — PANTOPRAZOLE SODIUM 40 MG: 40 TABLET, DELAYED RELEASE ORAL at 06:33

## 2018-01-01 RX ADMIN — ROSUVASTATIN CALCIUM 20 MG: 20 TABLET, FILM COATED ORAL at 21:50

## 2018-01-01 RX ADMIN — MORPHINE SULFATE 4 MG: 4 INJECTION, SOLUTION INTRAMUSCULAR; INTRAVENOUS at 01:38

## 2018-01-01 RX ADMIN — ISOSORBIDE MONONITRATE 60 MG: 30 TABLET, EXTENDED RELEASE ORAL at 08:00

## 2018-01-01 RX ADMIN — HYDROMORPHONE HYDROCHLORIDE 1 MG: 2 INJECTION, SOLUTION INTRAMUSCULAR; INTRAVENOUS; SUBCUTANEOUS at 19:45

## 2018-01-01 RX ADMIN — ENOXAPARIN SODIUM 40 MG: 40 INJECTION, SOLUTION INTRAVENOUS; SUBCUTANEOUS at 22:45

## 2018-01-01 RX ADMIN — HYDROMORPHONE HYDROCHLORIDE 0.5 MG: 2 INJECTION, SOLUTION INTRAMUSCULAR; INTRAVENOUS; SUBCUTANEOUS at 16:00

## 2018-01-01 RX ADMIN — ASPIRIN 81 MG: 81 TABLET, COATED ORAL at 08:32

## 2018-01-01 RX ADMIN — BISACODYL 10 MG: 10 SUPPOSITORY RECTAL at 09:43

## 2018-01-01 RX ADMIN — SUCRALFATE 1 G: 1 SUSPENSION ORAL at 12:07

## 2018-01-01 RX ADMIN — LORAZEPAM 2 MG: 2 INJECTION INTRAMUSCULAR; INTRAVENOUS at 05:10

## 2018-01-01 RX ADMIN — MORPHINE SULFATE 4 MG: 4 INJECTION, SOLUTION INTRAMUSCULAR; INTRAVENOUS at 13:02

## 2018-01-01 RX ADMIN — Medication 10 ML: at 13:07

## 2018-01-01 RX ADMIN — ALBUTEROL SULFATE 2.5 MG: 2.5 SOLUTION RESPIRATORY (INHALATION) at 20:29

## 2018-01-01 RX ADMIN — ISOSORBIDE MONONITRATE 60 MG: 30 TABLET, EXTENDED RELEASE ORAL at 10:08

## 2018-01-01 RX ADMIN — MORPHINE SULFATE 4 MG: 4 INJECTION, SOLUTION INTRAMUSCULAR; INTRAVENOUS at 09:04

## 2018-01-01 RX ADMIN — DRONEDARONE 400 MG: 400 TABLET, FILM COATED ORAL at 17:42

## 2018-01-01 RX ADMIN — FAMOTIDINE 40 MG: 20 TABLET ORAL at 22:45

## 2018-01-01 RX ADMIN — LATANOPROST 1 DROP: 50 SOLUTION OPHTHALMIC at 22:20

## 2018-01-01 RX ADMIN — HALOPERIDOL LACTATE 4 MG: 5 INJECTION, SOLUTION INTRAMUSCULAR at 21:12

## 2018-01-01 RX ADMIN — METOPROLOL TARTRATE 50 MG: 50 TABLET ORAL at 10:09

## 2018-01-01 RX ADMIN — SODIUM CHLORIDE AND POTASSIUM CHLORIDE: 9; 1.49 INJECTION, SOLUTION INTRAVENOUS at 21:57

## 2018-01-01 RX ADMIN — HYDROMORPHONE HYDROCHLORIDE 0.5 MG: 2 INJECTION, SOLUTION INTRAMUSCULAR; INTRAVENOUS; SUBCUTANEOUS at 17:46

## 2018-01-01 RX ADMIN — DRONEDARONE 400 MG: 400 TABLET, FILM COATED ORAL at 08:33

## 2018-01-01 RX ADMIN — Medication 10 ML: at 20:11

## 2018-01-01 RX ADMIN — SODIUM CHLORIDE, SODIUM LACTATE, POTASSIUM CHLORIDE, AND CALCIUM CHLORIDE 150 ML/HR: 600; 310; 30; 20 INJECTION, SOLUTION INTRAVENOUS at 07:57

## 2018-01-01 RX ADMIN — WATER 10 MG: 1 INJECTION INTRAMUSCULAR; INTRAVENOUS; SUBCUTANEOUS at 17:50

## 2018-01-01 RX ADMIN — BISACODYL 10 MG: 10 SUPPOSITORY RECTAL at 20:04

## 2018-01-01 RX ADMIN — HALOPERIDOL LACTATE 4 MG: 5 INJECTION, SOLUTION INTRAMUSCULAR at 20:12

## 2018-01-01 RX ADMIN — ASPIRIN 81 MG: 81 TABLET, COATED ORAL at 09:29

## 2018-01-01 RX ADMIN — ALBUTEROL SULFATE 2.5 MG: 2.5 SOLUTION RESPIRATORY (INHALATION) at 16:19

## 2018-01-01 RX ADMIN — SUCRALFATE 1 G: 1 SUSPENSION ORAL at 16:59

## 2018-01-01 RX ADMIN — ASPIRIN 81 MG: 81 TABLET, COATED ORAL at 08:45

## 2018-01-01 RX ADMIN — ISOSORBIDE MONONITRATE 60 MG: 30 TABLET, EXTENDED RELEASE ORAL at 08:32

## 2018-01-01 RX ADMIN — GLYCOPYRROLATE 0.2 MG: 0.2 INJECTION, SOLUTION INTRAMUSCULAR; INTRAVENOUS at 17:30

## 2018-01-01 RX ADMIN — ALBUTEROL SULFATE 2.5 MG: 2.5 SOLUTION RESPIRATORY (INHALATION) at 15:16

## 2018-01-01 RX ADMIN — MORPHINE SULFATE 4 MG: 4 INJECTION, SOLUTION INTRAMUSCULAR; INTRAVENOUS at 21:54

## 2018-01-01 RX ADMIN — Medication: at 16:21

## 2018-01-01 RX ADMIN — ONDANSETRON 4 MG: 2 INJECTION INTRAMUSCULAR; INTRAVENOUS at 12:59

## 2018-01-01 RX ADMIN — FAMOTIDINE 40 MG: 20 TABLET ORAL at 21:11

## 2018-01-01 RX ADMIN — NYSTATIN: 100000 POWDER TOPICAL at 17:30

## 2018-01-01 RX ADMIN — EPHEDRINE SULFATE 10 MG: 50 INJECTION, SOLUTION INTRAVENOUS at 08:40

## 2018-01-01 RX ADMIN — NYSTATIN: 100000 POWDER TOPICAL at 09:05

## 2018-01-01 RX ADMIN — POTASSIUM CHLORIDE 10 MEQ: 10 TABLET, EXTENDED RELEASE ORAL at 10:09

## 2018-01-01 RX ADMIN — MAGNESIUM HYDROXIDE 30 ML: 400 SUSPENSION ORAL at 07:58

## 2018-01-01 RX ADMIN — Medication 10 ML: at 13:10

## 2018-01-01 RX ADMIN — LATANOPROST 1 DROP: 50 SOLUTION OPHTHALMIC at 00:52

## 2018-01-01 RX ADMIN — Medication 5 ML: at 04:03

## 2018-01-01 RX ADMIN — ONDANSETRON 4 MG: 2 INJECTION INTRAMUSCULAR; INTRAVENOUS at 09:45

## 2018-01-01 RX ADMIN — ALLOPURINOL 300 MG: 100 TABLET ORAL at 21:11

## 2018-01-01 RX ADMIN — MORPHINE SULFATE 4 MG: 4 INJECTION, SOLUTION INTRAMUSCULAR; INTRAVENOUS at 03:57

## 2018-01-01 RX ADMIN — LORAZEPAM 2 MG: 2 INJECTION INTRAMUSCULAR; INTRAVENOUS at 23:16

## 2018-01-01 RX ADMIN — METOPROLOL TARTRATE 50 MG: 50 TABLET ORAL at 08:46

## 2018-01-01 RX ADMIN — HALOPERIDOL LACTATE 4 MG: 5 INJECTION, SOLUTION INTRAMUSCULAR at 19:31

## 2018-01-01 RX ADMIN — Medication 10 ML: at 05:19

## 2018-01-01 RX ADMIN — MORPHINE SULFATE 4 MG: 4 INJECTION, SOLUTION INTRAMUSCULAR; INTRAVENOUS at 21:22

## 2018-01-01 RX ADMIN — SODIUM BICARBONATE 2 ML: 0.2 INJECTION, SOLUTION INTRAVENOUS at 08:58

## 2018-01-01 RX ADMIN — GLYCOPYRROLATE 0.8 MG: 0.2 INJECTION INTRAMUSCULAR; INTRAVENOUS at 09:24

## 2018-01-01 RX ADMIN — CITROMA MAGNESIUM CITRATE 296 ML: 1.75 LIQUID ORAL at 09:43

## 2018-01-01 RX ADMIN — Medication 10 ML: at 06:24

## 2018-01-01 RX ADMIN — MORPHINE SULFATE 4 MG: 4 INJECTION, SOLUTION INTRAMUSCULAR; INTRAVENOUS at 19:55

## 2018-01-01 RX ADMIN — POTASSIUM CHLORIDE 10 MEQ: 10 TABLET, EXTENDED RELEASE ORAL at 08:45

## 2018-01-01 RX ADMIN — MORPHINE SULFATE 4 MG: 4 INJECTION, SOLUTION INTRAMUSCULAR; INTRAVENOUS at 14:19

## 2018-01-01 RX ADMIN — FINASTERIDE 5 MG: 5 TABLET, FILM COATED ORAL at 20:27

## 2018-01-01 RX ADMIN — HYDROMORPHONE HYDROCHLORIDE 0.5 MG: 2 INJECTION, SOLUTION INTRAMUSCULAR; INTRAVENOUS; SUBCUTANEOUS at 18:49

## 2018-01-01 RX ADMIN — LISINOPRIL AND HYDROCHLOROTHIAZIDE 1 TABLET: 25; 20 TABLET ORAL at 08:46

## 2018-01-01 RX ADMIN — WATER 10 MG: 1 INJECTION INTRAMUSCULAR; INTRAVENOUS; SUBCUTANEOUS at 09:39

## 2018-01-01 RX ADMIN — HALOPERIDOL LACTATE 4 MG: 5 INJECTION, SOLUTION INTRAMUSCULAR at 08:35

## 2018-01-01 RX ADMIN — SODIUM CHLORIDE AND POTASSIUM CHLORIDE: 9; 1.49 INJECTION, SOLUTION INTRAVENOUS at 22:58

## 2018-01-01 RX ADMIN — ROCURONIUM BROMIDE 10 MG: 10 INJECTION, SOLUTION INTRAVENOUS at 08:30

## 2018-01-01 RX ADMIN — HYDROMORPHONE HYDROCHLORIDE 0.5 MG: 2 INJECTION, SOLUTION INTRAMUSCULAR; INTRAVENOUS; SUBCUTANEOUS at 04:05

## 2018-01-01 RX ADMIN — ONDANSETRON 4 MG: 2 INJECTION INTRAMUSCULAR; INTRAVENOUS at 12:56

## 2018-01-01 RX ADMIN — ROSUVASTATIN CALCIUM 20 MG: 20 TABLET, FILM COATED ORAL at 22:09

## 2018-01-01 RX ADMIN — LISINOPRIL AND HYDROCHLOROTHIAZIDE 1 TABLET: 25; 20 TABLET ORAL at 10:09

## 2018-01-01 RX ADMIN — OXYCODONE HYDROCHLORIDE AND ACETAMINOPHEN 2 TABLET: 5; 325 TABLET ORAL at 21:49

## 2018-01-01 RX ADMIN — GLYCOPYRROLATE 0.2 MG: 0.2 INJECTION, SOLUTION INTRAMUSCULAR; INTRAVENOUS at 21:51

## 2018-01-01 RX ADMIN — DRONEDARONE 400 MG: 400 TABLET, FILM COATED ORAL at 16:20

## 2018-01-01 RX ADMIN — SUCRALFATE 1 G: 1 SUSPENSION ORAL at 11:23

## 2018-01-01 RX ADMIN — POTASSIUM CHLORIDE 10 MEQ: 10 TABLET, EXTENDED RELEASE ORAL at 08:32

## 2018-01-01 RX ADMIN — MORPHINE SULFATE 4 MG: 4 INJECTION, SOLUTION INTRAMUSCULAR; INTRAVENOUS at 01:26

## 2018-01-01 RX ADMIN — LORAZEPAM 2 MG: 2 INJECTION INTRAMUSCULAR; INTRAVENOUS at 17:30

## 2018-01-01 RX ADMIN — MORPHINE SULFATE 4 MG: 4 INJECTION, SOLUTION INTRAMUSCULAR; INTRAVENOUS at 06:01

## 2018-01-01 RX ADMIN — FENOFIBRATE 160 MG: 160 TABLET ORAL at 10:09

## 2018-01-01 RX ADMIN — MORPHINE SULFATE 4 MG: 4 INJECTION, SOLUTION INTRAMUSCULAR; INTRAVENOUS at 06:19

## 2018-01-01 RX ADMIN — ISOSORBIDE MONONITRATE 60 MG: 30 TABLET, EXTENDED RELEASE ORAL at 08:45

## 2018-01-01 RX ADMIN — DRONEDARONE 400 MG: 400 TABLET, FILM COATED ORAL at 17:35

## 2018-01-01 RX ADMIN — SODIUM CHLORIDE AND POTASSIUM CHLORIDE: 9; 1.49 INJECTION, SOLUTION INTRAVENOUS at 01:15

## 2018-01-01 RX ADMIN — Medication 5 ML: at 02:09

## 2018-01-01 RX ADMIN — FENOFIBRATE 160 MG: 160 TABLET ORAL at 08:33

## 2018-01-01 RX ADMIN — HALOPERIDOL LACTATE 2 MG: 5 INJECTION, SOLUTION INTRAMUSCULAR at 16:14

## 2018-01-01 RX ADMIN — Medication 10 ML: at 05:25

## 2018-01-01 RX ADMIN — HALOPERIDOL LACTATE 4 MG: 5 INJECTION, SOLUTION INTRAMUSCULAR at 06:12

## 2018-01-01 RX ADMIN — MORPHINE SULFATE 4 MG: 4 INJECTION, SOLUTION INTRAMUSCULAR; INTRAVENOUS at 16:47

## 2018-01-01 RX ADMIN — PANTOPRAZOLE SODIUM 40 MG: 40 TABLET, DELAYED RELEASE ORAL at 16:59

## 2018-01-01 RX ADMIN — EPHEDRINE SULFATE 10 MG: 50 INJECTION, SOLUTION INTRAVENOUS at 08:51

## 2018-01-01 RX ADMIN — TAMSULOSIN HYDROCHLORIDE 0.4 MG: 0.4 CAPSULE ORAL at 10:09

## 2018-01-01 RX ADMIN — HYDROMORPHONE HYDROCHLORIDE 0.5 MG: 2 INJECTION, SOLUTION INTRAMUSCULAR; INTRAVENOUS; SUBCUTANEOUS at 06:20

## 2018-03-27 NOTE — THERAPY EVALUATION
Debora Ward  : 1940  [place]  [facility]  [address], [address], [place] [address].  [phone]   [phone]          OUTPATIENT PHYSICAL THERAPY:Initial Assessment 3/27/2018    ICD-10: Treatment Diagnosis: RADICULOPATHY, LUMBAR REGION M54.16  Precautions/Allergies:   Potassium   Fall Risk Score: 2 (? 5 = High Risk)  MD Orders: eval and treat, aquatic PT MEDICAL/REFERRING DIAGNOSIS:  low back   DATE OF ONSET: chronic; new episode in 10/2017  REFERRING PHYSICIAN: Carmen Claudio MD  RETURN PHYSICIAN APPOINTMENT: not scheduled     INITIAL ASSESSMENT:  Mr. Haresh Contreras is a 78year old man with c/o mostly localized LBP with intermittent R lateral LE pain that affects his ability to walk and stand except for short periods. 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 He presents with acquired lumbar levoscoliosis with hips shifted R and stands in slight forward bent posture as well as has R gait antalgia and has + R SLR, R SLUMP tests. Mr. Haresh Contreras has PMHx of heart disease and prediabetes and needs to be able to walk for improved health. HE is a good candidate for skilled physical therapy for rehabilitation towards improved independence with functional activities such as returning to maintaining her house and yard. PROBLEM LIST (Impacting functional limitations):  1. Decreased Strength  2. Decreased ADL/Functional Activities  3. Decreased Balance  4. Increased Pain  5. Decreased Flexibility/Joint Mobility INTERVENTIONS PLANNED:  1. Modalities PRN, including ultrasound, estim, and iontophoresis  2. Soft tissue and joint mobilization for ROM and flexibility  3. Stretching, progressive resistive exercises and HEP for return to functional activities.    4. Back Education and Training for body mechanics with activities of daily living  4. If needed, aquatic therapy. TREATMENT PLAN:  Effective Dates: 3/27/2018 TO 5/26/2018 (60 days). Frequency/Duration: 2 times a week for 60 Days. NOTE: Will increased to 3x per week if needed. GOALS: (Goals have been discussed and agreed upon with patient.)  Short-Term Functional Goals: Time Frame: 4 weeks  1. Pt to tolerate 45 minutes of aquatic therapy and be independent with initial level aquatic program.  2. Able to tolerate standing 15 minutes for household activities. 3. Normalized gait. 4. Independent with initial level HEP. Discharge Goals: Time Frame: 8 weeks  1. Oswestry score improves to at least 10 for improved function. 2. Able to return to mowing his lawn with push mower. 3. Demonstrates good understanding of back posture with functional activities he does at home and in his yard. 4. Able to walk at least 15 minutes with least AD for community integration  Rehabilitation Potential For Stated Goals: GUARDED  Regarding Fei Mosley's therapy, I certify that the treatment plan above will be carried out by a therapist or under their direction. Thank you for this referral,  Neha Lane, PT     Referring Physician Signature: Elease Eisenmenger, MD              Date                    The information in this section was collected on 3/27/18 (except where otherwise noted). HISTORY:   History of Present Injury/Illness (Reason for Referral):  Pt with chronic LBP and had surgery in 7/2015 for back pain, as well as L hip THR in 7/2016. Pt had onset of new episode of LBP around October 2017. MRI shows severe arthritis with mild levo scoliosis. Had 1 epidural injection and pain relief from this lasted for short while. Pt has PMHx of kidney disease which affects medications he can take and surgery for 12 heart stents. He wants to walk but unable to because of LBP.    Past Medical History/Comorbidities: Mr. Sachi Younger  has a past medical history of Aortic valve insufficiency (1/12/2016); Bicuspid aortic valve (1/12/2016); Cataract; Chronic kidney disease, stage III (moderate) (5/17/2016); Chronic kidney failure (1/12/2016); Chronic musculoskeletal pain; Coronary artery disease involving native coronary artery without angina pectoris (5/17/2016); DDD (degenerative disc disease); Diabetes mellitus, type 2 ([facility] GERD (gastroesophageal reflux disease); Gout; History of atrial fibrillation; History of basal cell carcinoma (2010); History of complete eye exam; History of dental examination (08/2016); History of kidney stones; History of MI (myocardial infarction) (1993); Hyperlipidemia; Hypertension; Morbid obesity with BMI of 40.0-44.9, adult ([facility] (1/12/2016); Murmur (1/12/2016); Prediabetes; Pure hypercholesterolemia (4/22/2016); and 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 Mr. Susan Corrigan  has a past surgical history that includes hx angioplasty ([id number]); hx malignant skin lesion excision; pr cardiac surg procedure unlist; hx heart catheterization; hx urological (1991); hx heent; hx back surgery (8/11/15); and hx cataract removal (Bilateral). Social History/Living Environment:    Lives in 1 story home with wife and has son that lives with them who has epilepsy. Prior Level of Function/Work/Activity:  Chronic pain and was only able to get  Back to walking short distances even after back surgery and hip surgery. Dominant Side:         RIGHT  Other Clinical Tests:          MRI showed severe arthritis with mild levo scoliosis. Previous Treatment Approaches:          Pt with scope for stenosis in lower lumbar in 2015.   Also has had 1 epidural for this episode of LBP  Current Medications:     Current Outpatient Prescriptions: Imdur, (angina) Plavix ( thinner), Eliquis (thinner), Multaq (a fib), Lopressor (BP), Fenofibrate, Crestor (cholesterol), Protonix, Lisinopril, Felodopine (BP), Finasteride (prostate), Tiamterene, Allopurnil (Uric acid), Urocit-k, Travatan (eyes), Vit D3, Calcium       Date Last Reviewed:  3/27/18   Number of Personal Factors/Comorbidities that affect the Plan of Care: 3+: HIGH COMPLEXITY   EXAMINATION:   Observation/Orthostatic Postural Assessment:    STAND:  Stands with hips shifted R and ~ 10 degrees lumbar forward bent posture  WALK:  R antalgic gait. Walks with R knee almost straight, decreased WB through R LE.  SUPINE:  Mild functional leg length discrepancy observed. Palpation:  Tight SUSAN obliques and intersegmental flexion in lower lumbar  ROM: SUSAN knee, ankle, hip AROM WNL except for R hip extension = 0 degrees, IR ~ 30 degrees    LUMBAR 50%   Flexion 10%    Extension    SB Right -   SB Left -   Rotation R -   Rotation L -             Strength:  See dermatomes below. SUSAN PF = 4/5  Special Tests:  Back pain decreases some with manual traction (done in side lye today). Did not change or increase with hip lateral glide L or lumbar extension reps. Flexibility:   · Hamstring Length (in 90/90 position):R  - 45 degrees L - 35 degrees  · Straight Leg Raise Test: + R  · Norbert Test (2-joint hip flexors): not assessed today  · Prone Knee Bend: not assessed today  · Sathish's Test: not assessed today  · Gastrocnemius: 0 degrees SUSAN  Neurological Screen:  Myotomes: WNL for SUSAN L2-L5 with mild decreased strength S1 with PF   Dermatomes: decreased sensation to R L2,3 to light touch  Reflexes: not assessed today  Neural Tension Tests:   Functional Mobility:   Unable to walk or stand more than ~ 5 minutes. Independent with compensation for pain with activities at home. Balance:  Pt unable to walk on toes because of balance difficulties. Need to further assess at future visit. Body Structures Involved:  1. Nerves  2. Bones  3. Joints  4. Ligaments Body Functions Affected:  1. Neuromusculoskeletal  2. Movement Related Activities and Participation Affected:  1. General Tasks and Demands  2.  Community, Social and [place]   Number of elements (examined above) that affect the Plan of Care: 4+: HIGH COMPLEXITY   CLINICAL PRESENTATION:   Presentation: Evolving clinical presentation with changing clinical characteristics: MODERATE COMPLEXITY   CLINICAL DECISION MAKING:   Outcome Measure: Tool Used: Modified Oswestry Low Back Pain Questionnaire  Score:  Initial: 18/50  Most Recent: X/50 (Date: -- )   Interpretation of Score: Each section is scored on a 0-5 scale, 5 representing the greatest disability. The scores of each section are added together for a total score of 50. Score 0 1-10 11-20 21-30 31-40 41-49 50   Modifier CH CI CJ CK CL CM CN     ? Mobility - Walking and Moving Around:     - CURRENT STATUS: CJ - 20%-39% impaired, limited or restricted    - GOAL STATUS: CI - 1%-19% impaired, limited or restricted    - D/C STATUS:  ---------------To be determined---------------    Medical Necessity:   · Patient is expected to demonstrate progress in strength, range of motion and balance to improve safety during walking, increase distance with walking. .  Reason for Services/Other Comments:  · Patient continues to require skilled intervention due to needs intervention for joint stiffness and postural imbalance; skilled guidance with therex. Use of outcome tool(s) and clinical judgement create a POC that gives a: Questionable prediction of patient's progress: MODERATE COMPLEXITY            TREATMENT:   (In addition to Assessment/Re-Assessment sessions the following treatments were rendered)  Pre-treatment Symptoms/Complaints:  Pain in central low back that radiates to the side of R LE. Pain in is described as sharp.   Pain: Initial:     8-9/10 Post Session:  Less than 8/10 and able to stand up straighter     Therapeutic Exercises: (10 min) started with standing lumbar extension x 10, then side facing to wall hip transverse glide to L x 10, then side lye hip posterior depression x 10 ea side (also including feet going slightly down with hip posterior depression). [employer]  Treatment/Session Assessment:    · Response to Treatment:  Pt had reports of decreased lumbar pain and was able to stand up straighter after doing side lye lumbar traction (manual and with exercise). Did not have any change in pain today with doing standing hip transverse glide L or lumbar extension. · Compliance with Program/Exercises: Will assess as treatment progresses. · Recommendations/Intent for next treatment session: \"Next visit will focus on initial visit with pool therapy. In addition to addressing lumbar stiffness, please also address R hip EXT/IR stiffness. Pt to have 1 aquatic and 1 land visit for next 8 visits. Will increase pool visits depending on response to sessions.   Total Treatment Duration: 65 min  PT Patient Time In/Time Out  Time In: 0905  Time Out: [address] [facility],[address], [place] [facility]

## 2018-03-27 NOTE — PROGRESS NOTES
Ambulatory/Rehab Services H2 Model Falls Risk Assessment    Risk Factor Pts. ·   Confusion/Disorientation/Impulsivity  []    4 ·   Symptomatic Depression  []   2 ·   Altered Elimination  []   1 ·   Dizziness/Vertigo  []   1 ·   Gender (Male)  [x]   1 ·   Any administered antiepileptics (anticonvulsants):  []   2 ·   Any administered benzodiazepines:  []   1 ·   Visual Impairment (specify):  []   1 ·   Portable Oxygen Use  []   1 ·   Orthostatic ? BP  []   1 ·   History of Recent Falls (within 3 mos.)  []   5     Ability to Rise from Chair (choose one) Pts. ·   Ability to rise in a single movement  []   0 ·   Pushes up, successful in one attempt  [x]   1 ·   Multiple attempts, but successful  []   3 ·   Unable to rise without assistance  []   4   Total: (5 or greater = High Risk) 0     Falls Prevention Plan:   []                Physical Limitations to Exercise (specify):   []                Mobility Assistance Device (type):   []                Exercise/Equipment Adaptation (specify):    ©2010 [facility] Patent #[id number].  Federal Law prohibits the replication, distribution or use without written permission from [facility] [employer]

## 2018-03-29 NOTE — THERAPY EVALUATION
Sanjana Harley  : 1940  [place]  Secondary: Malcom Rahman at [address], [address], [facility]. [facility].  [phone]   [phone]          OUTPATIENT PHYSICAL THERAPY:Daily Note and Aquatic 3/29/2018    ICD-10: Treatment Diagnosis: RADICULOPATHY, LUMBAR REGION M54.16  Precautions/Allergies:   Potassium   Fall Risk Score: 2 (? 5 = High Risk)  MD Orders: eval and treat, aquatic PT MEDICAL/REFERRING DIAGNOSIS:  RT S1 Radiculopathy   DATE OF ONSET: chronic; new episode in 10/2017  REFERRING PHYSICIAN: Krishna Curry MD  RETURN PHYSICIAN APPOINTMENT: not scheduled     INITIAL ASSESSMENT:  Mr. Matt Vergara is a 78year old man with c/o mostly localized LBP with intermittent R lateral LE pain that affects his ability to walk and stand except for short periods. 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 He presents with acquired lumbar levoscoliosis with hips shifted R and stands in slight forward bent posture as well as has R gait antalgia and has + R SLR, R SLUMP tests. Mr. Matt Vergara has PMHx of heart disease and prediabetes and needs to be able to walk for improved health. HE is a good candidate for skilled physical therapy for rehabilitation towards improved independence with functional activities such as returning to maintaining her house and yard. PROBLEM LIST (Impacting functional limitations):  1. Decreased Strength  2. Decreased ADL/Functional Activities  3. Decreased Balance  4. Increased Pain  5. Decreased Flexibility/Joint Mobility INTERVENTIONS PLANNED:  1. Modalities PRN, including ultrasound, estim, and iontophoresis  2. Soft tissue and joint mobilization for ROM and flexibility  3. Stretching, progressive resistive exercises and HEP for return to functional activities.    4. Back Education and Training for body mechanics with activities of daily living  4. If needed, aquatic therapy. TREATMENT PLAN:  Effective Dates: 3/27/2018 TO 5/26/2018 (60 days). Frequency/Duration: 2 times a week for 60 Days. NOTE: Will increased to 3x per week if needed. GOALS: (Goals have been discussed and agreed upon with patient.)  Short-Term Functional Goals: Time Frame: 4 weeks  1. Pt to tolerate 45 minutes of aquatic therapy and be independent with initial level aquatic program.  2. Able to tolerate standing 15 minutes for household activities. 3. Normalized gait. 4. Independent with initial level HEP. Discharge Goals: Time Frame: 8 weeks  1. Oswestry score improves to at least 10 for improved function. 2. Able to return to mowing his lawn with push mower. 3. Demonstrates good understanding of back posture with functional activities he does at home and in his yard. 4. Able to walk at least 15 minutes with least AD for community integration  Rehabilitation Potential For Stated Goals: GUARDED  Regarding Filipe Mosley's therapy, I certify that the treatment plan above will be carried out by a therapist or under their direction. Thank you for this referral,  Deborah Dinero PTA     Referring Physician Signature: Krishna Curry MD              Date                    The information in this section was collected on 3/27/18 (except where otherwise noted). HISTORY:   History of Present Injury/Illness (Reason for Referral):  Pt with chronic LBP and had surgery in 7/2015 for back pain, as well as L hip THR in 7/2016. Pt had onset of new episode of LBP around October 2017. MRI shows severe arthritis with mild levo scoliosis. Had 1 epidural injection and pain relief from this lasted for short while. Pt has PMHx of kidney disease which affects medications he can take and surgery for 12 heart stents. He wants to walk but unable to because of LBP.    Past Medical History/Comorbidities: Mr. Matt Vergara  has a past medical history of Aortic valve insufficiency (1/12/2016); Bicuspid aortic valve (1/12/2016); Cataract; Chronic kidney disease, stage III (moderate) (5/17/2016); Chronic kidney failure (1/12/2016); Chronic musculoskeletal pain; Coronary artery disease involving native coronary artery without angina pectoris (5/17/2016); DDD (degenerative disc disease); Diabetes mellitus, type 2 ([facility] GERD (gastroesophageal reflux disease); Gout; History of atrial fibrillation; History of basal cell carcinoma (2010); History of complete eye exam; History of dental examination (08/2016); History of kidney stones; History of MI (myocardial infarction) (1993); Hyperlipidemia; Hypertension; Morbid obesity with BMI of 40.0-44.9, adult ([facility].) (1/12/2016); Murmur (1/12/2016); Prediabetes; Pure hypercholesterolemia (4/22/2016); and 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 Mr. Pako Crum  has a past surgical history that includes hx angioplasty ([id number]); hx malignant skin lesion excision; pr cardiac surg procedure unlist; hx heart catheterization; hx urological (1991); hx heent; hx back surgery (8/11/15); and hx cataract removal (Bilateral). Social History/Living Environment:    Lives in 1 story home with wife and has son that lives with them who has epilepsy. Prior Level of Function/Work/Activity:  Chronic pain and was only able to get  Back to walking short distances even after back surgery and hip surgery. Dominant Side:         RIGHT  Other Clinical Tests:          MRI showed severe arthritis with mild levo scoliosis. Previous Treatment Approaches:          Pt with scope for stenosis in lower lumbar in 2015.   Also has had 1 epidural for this episode of LBP  Current Medications:     Current Outpatient Prescriptions: Imdur, (angina) Plavix ( thinner), Eliquis (thinner), Multaq (a fib), Lopressor (BP), Fenofibrate, Crestor (cholesterol), Protonix, Lisinopril, Felodopine (BP), Finasteride (prostate), Tiamterene, Allopurnil (Uric acid), Urocit-k, Travatan (eyes), Vit D3, Calcium       Date Last Reviewed:  3/27/18   Number of Personal Factors/Comorbidities that affect the Plan of Care: 3+: HIGH COMPLEXITY   EXAMINATION:   Observation/Orthostatic Postural Assessment:    STAND:  Stands with hips shifted R and ~ 10 degrees lumbar forward bent posture  WALK:  R antalgic gait. Walks with R knee almost straight, decreased WB through R LE.  SUPINE:  Mild functional leg length discrepancy observed. Palpation:  Tight SUSAN obliques and intersegmental flexion in lower lumbar  ROM: SUSAN knee, ankle, hip AROM WNL except for R hip extension = 0 degrees, IR ~ 30 degrees    LUMBAR 50%   Flexion 10%    Extension    SB Right -   SB Left -   Rotation R -   Rotation L -             Strength:  See dermatomes below. SUSAN PF = 4/5  Special Tests:  Back pain decreases some with manual traction (done in side lye today). Did not change or increase with hip lateral glide L or lumbar extension reps. Flexibility:   · Hamstring Length (in 90/90 position):R  - 45 degrees L - 35 degrees  · Straight Leg Raise Test: + R  · Norbert Test (2-joint hip flexors): not assessed today  · Prone Knee Bend: not assessed today  · Sathish's Test: not assessed today  · Gastrocnemius: 0 degrees SUSAN  Neurological Screen:  Myotomes: WNL for SUSAN L2-L5 with mild decreased strength S1 with PF   Dermatomes: decreased sensation to R L2,3 to light touch  Reflexes: not assessed today  Neural Tension Tests:   Functional Mobility:   Unable to walk or stand more than ~ 5 minutes. Independent with compensation for pain with activities at home. Balance:  Pt unable to walk on toes because of balance difficulties. Need to further assess at future visit. Body Structures Involved:  1. Nerves  2. Bones  3. Joints  4. Ligaments Body Functions Affected:  1. Neuromusculoskeletal  2. Movement Related Activities and Participation Affected:  1. General Tasks and Demands  2.  Community, Social and [place] [employer]   Number of elements (examined above) that affect the Plan of Care: 4+: HIGH COMPLEXITY   CLINICAL PRESENTATION:   Presentation: Evolving clinical presentation with changing clinical characteristics: MODERATE COMPLEXITY   CLINICAL DECISION MAKING:   Outcome Measure: Tool Used: Modified Oswestry Low Back Pain Questionnaire  Score:  Initial: 18/50  Most Recent: X/50 (Date: -- )   Interpretation of Score: Each section is scored on a 0-5 scale, 5 representing the greatest disability. The scores of each section are added together for a total score of 50. Score 0 1-10 11-20 21-30 31-40 41-49 50   Modifier CH CI CJ CK CL CM CN     ? Mobility - Walking and Moving Around:     - CURRENT STATUS: CJ - 20%-39% impaired, limited or restricted    - GOAL STATUS: CI - 1%-19% impaired, limited or restricted    - D/C STATUS:  ---------------To be determined---------------    Medical Necessity:   · Patient is expected to demonstrate progress in strength, range of motion and balance to improve safety during walking, increase distance with walking. .  Reason for Services/Other Comments:  · Patient continues to require skilled intervention due to needs intervention for joint stiffness and postural imbalance; skilled guidance with therex. Use of outcome tool(s) and clinical judgement create a POC that gives a: Questionable prediction of patient's progress: MODERATE COMPLEXITY            TREATMENT:   (In addition to Assessment/Re-Assessment sessions the following treatments were rendered)  Pre-treatment Symptoms/Complaints:   Pt with moderate amount of pain this morning. Pain: Initial:     5-6/10 lower back Post Session:  Not rated     Therapeutic Exercises: (0 min) started with standing lumbar extension x 10, then side facing to wall hip transverse glide to L x 10, then side lye hip posterior depression x 10 ea side (also including feet going slightly down with hip posterior depression). Aquatic Therapy (40 minutes):  Aquatic treatment performed per flow grid for Decreased muscle strength, Decreased endurance, Decreased range of motion, Decreased activity endurance and Ease of movement. Cues provided for posture and balance. Aquatic Exercise Log       Date  3/29/18 Date   Date   Date   Date     Activity/ Exercise Parameters Parameters Parameters Parameters Parameters   Walking forward 4       Walking backward 4       Walking sideways 4         Marching 4         Goose Step 4         Tip toes 2         Heels 2         Lunges        Side step squats        LE Exercises 4. 5' holding rail         Hip Flex/Ext 10         Hip Abd/Add 10         Hip IR/ER          Calf raises 10         Knee Flex 10         Squats          Leg Circles 10/10         Step Ups        UE Exercises          Squeeze In          Push Down          Pull Down          Bicep/Tricep        Rows/Press outs         Chi Positions          Trunk Rotation        Deep H2O/ Noodles 7' with blue rings         Stabilization 3 min         Arms only          Legs only Bicycle 2 min       Cross   Country 2 min         Scissors 2 min         Crab walk        Lower abdominal   work  [place] 2 min         Cardio          Jogging        Lap   Swimming          Stretches          Hamstrings          Heelcords          Piriformis          Neck          [employer] Portal  Treatment/Session Assessment:    · Response to Treatment:  Pt tolerated aquatic therapy well this morning. · Compliance with Program/Exercises: Will assess as treatment progresses. · Recommendations/Intent for next treatment session: \"Next visit will focus on initial visit with pool therapy. In addition to addressing lumbar stiffness, please also address R hip EXT/IR stiffness. Pt to have 1 aquatic and 1 land visit for next 8 visits. Will increase pool visits depending on response to sessions.   Total Treatment Duration: 40 min  PT Patient Time In/Time Out  Time In: 0840  Time Out: 0996    Norbert Soria PTA

## 2018-04-03 PROBLEM — E66.01 SEVERE OBESITY (BMI 35.0-39.9) WITH COMORBIDITY (HCC): Status: ACTIVE | Noted: 2018-01-01

## 2018-04-06 NOTE — THERAPY EVALUATION
Magali Oscar  : 1940  [place]  Secondary: Malcom Rahman at [address], [address], [place] [address].  [phone]   [phone]          OUTPATIENT PHYSICAL THERAPY:Daily Note 2018    ICD-10: Treatment Diagnosis: RADICULOPATHY, LUMBAR REGION M54.16  Precautions/Allergies:   Potassium   Fall Risk Score: 2 (? 5 = High Risk)  MD Orders: eval and treat, aquatic PT MEDICAL/REFERRING DIAGNOSIS:  RT S1 Radiculopathy   DATE OF ONSET: chronic; new episode in 10/2017  REFERRING PHYSICIAN: Peggy Ojeda MD  RETURN PHYSICIAN APPOINTMENT: not scheduled     INITIAL ASSESSMENT:  Mr. Davide Mayo is a 78year old man with c/o mostly localized LBP with intermittent R lateral LE pain that affects his ability to walk and stand except for short periods. 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 He presents with acquired lumbar levoscoliosis with hips shifted R and stands in slight forward bent posture as well as has R gait antalgia and has + R SLR, R SLUMP tests. Mr. Davide Mayo has PMHx of heart disease and prediabetes and needs to be able to walk for improved health. HE is a good candidate for skilled physical therapy for rehabilitation towards improved independence with functional activities such as returning to maintaining her house and yard. PROBLEM LIST (Impacting functional limitations):  1. Decreased Strength  2. Decreased ADL/Functional Activities  3. Decreased Balance  4. Increased Pain  5. Decreased Flexibility/Joint Mobility INTERVENTIONS PLANNED:  1. Modalities PRN, including ultrasound, estim, and iontophoresis  2. Soft tissue and joint mobilization for ROM and flexibility  3. Stretching, progressive resistive exercises and HEP for return to functional activities.    4. Back Education and Training for body mechanics with activities of daily living  4. If needed, aquatic therapy. TREATMENT PLAN:  Effective Dates: 3/27/2018 TO 5/26/2018 (60 days). Frequency/Duration: 2 times a week for 60 Days. NOTE: Will increased to 3x per week if needed. GOALS: (Goals have been discussed and agreed upon with patient.)  Short-Term Functional Goals: Time Frame: 4 weeks  1. Pt to tolerate 45 minutes of aquatic therapy and be independent with initial level aquatic program.  2. Able to tolerate standing 15 minutes for household activities. 3. Normalized gait. 4. Independent with initial level HEP. Discharge Goals: Time Frame: 8 weeks  1. Oswestry score improves to at least 10 for improved function. 2. Able to return to mowing his lawn with push mower. 3. Demonstrates good understanding of back posture with functional activities he does at home and in his yard. 4. Able to walk at least 15 minutes with least AD for community integration  Rehabilitation Potential For Stated Goals: GUARDED  Regarding Sonnydari Laurent Dimitri's therapy, I certify that the treatment plan above will be carried out by a therapist or under their direction. Thank you for this referral,  Myriam Blackwood, PT     Referring Physician Signature: Brian Robbins MD              Date                    The information in this section was collected on 3/27/18 (except where otherwise noted). HISTORY:   History of Present Injury/Illness (Reason for Referral):  Pt with chronic LBP and had surgery in 7/2015 for back pain, as well as L hip THR in 7/2016. Pt had onset of new episode of LBP around October 2017. MRI shows severe arthritis with mild levo scoliosis. Had 1 epidural injection and pain relief from this lasted for short while. Pt has PMHx of kidney disease which affects medications he can take and surgery for 12 heart stents. He wants to walk but unable to because of LBP.    Past Medical History/Comorbidities: Mr. Jovan Dominguez  has a past medical history of Aortic valve insufficiency (1/12/2016); Bicuspid aortic valve (1/12/2016); Cataract; Chronic kidney disease, stage III (moderate) (5/17/2016); Chronic kidney failure (1/12/2016); Chronic musculoskeletal pain; Coronary artery disease involving native coronary artery without angina pectoris (5/17/2016); DDD (degenerative disc disease); Diabetes mellitus, type 2 ([facility] 75[facility] GERD (gastroesophageal reflux disease); Gout; History of atrial fibrillation; History of basal cell carcinoma (2010); History of complete eye exam; History of dental examination (08/2016); History of kidney stones; History of MI (myocardial infarction) (1993); Hyperlipidemia; Hypertension; Morbid obesity with BMI of 40.0-44.9, adult ([facility].) (1/12/2016); Murmur (1/12/2016); Prediabetes; Pure hypercholesterolemia (4/22/2016); and 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 Mr. Leeanna Chery  has a past surgical history that includes hx angioplasty ([id number]); hx malignant skin lesion excision; pr cardiac surg procedure unlist; hx heart catheterization; hx urological (1991); hx heent; hx back surgery (8/11/15); and hx cataract removal (Bilateral). Social History/Living Environment:    Lives in 1 story home with wife and has son that lives with them who has epilepsy. Prior Level of Function/Work/Activity:  Chronic pain and was only able to get  Back to walking short distances even after back surgery and hip surgery. Dominant Side:         RIGHT  Other Clinical Tests:          MRI showed severe arthritis with mild levo scoliosis. Previous Treatment Approaches:          Pt with scope for stenosis in lower lumbar in 2015.   Also has had 1 epidural for this episode of LBP  Current Medications:     Current Outpatient Prescriptions: Imdur, (angina) Plavix ( thinner), Eliquis (thinner), Multaq (a fib), Lopressor (BP), Fenofibrate, Crestor (cholesterol), Protonix, Lisinopril, Felodopine (BP), Finasteride (prostate), Tiamterene, Allopurnil (Uric acid), Urocit-k, Travatan (eyes), Vit D3, Calcium       Date Last Reviewed:  3/27/18   Number of Personal Factors/Comorbidities that affect the Plan of Care: 3+: HIGH COMPLEXITY   EXAMINATION:   Observation/Orthostatic Postural Assessment:  4/6/18 stand:  Hips  Shifted less to the R and almost standing up straight. STAND:  Stands with hips shifted R and ~ 10 degrees lumbar forward bent posture  WALK:  R antalgic gait. Walks with R knee almost straight, decreased WB through R LE.  SUPINE:  Mild functional leg length discrepancy observed. Palpation:  Tight SUSAN obliques and intersegmental flexion in lower lumbar  ROM: SUSAN knee, ankle, hip AROM WNL except for R hip extension = 0 degrees, IR ~ 30 degrees    LUMBAR 50%   Flexion 10%    Extension    SB Right -   SB Left -   Rotation R -   Rotation L -             Strength:  See dermatomes below. SUSAN PF = 4/5  Special Tests:  Back pain decreases some with manual traction (done in side lye today). Did not change or increase with hip lateral glide L or lumbar extension reps. Flexibility:   · Hamstring Length (in 90/90 position):R  - 45 degrees L - 35 degrees  · Straight Leg Raise Test: + R  · Norbert Test (2-joint hip flexors): not assessed today  · Prone Knee Bend: not assessed today  · Sathish's Test: not assessed today  · Gastrocnemius: 0 degrees SUSAN  Neurological Screen:  Myotomes: WNL for SUSAN L2-L5 with mild decreased strength S1 with PF   Dermatomes: decreased sensation to R L2,3 to light touch  Reflexes: not assessed today  Neural Tension Tests:   Functional Mobility:   Unable to walk or stand more than ~ 5 minutes. Independent with compensation for pain with activities at home. Balance:  Pt unable to walk on toes because of balance difficulties. Need to further assess at future visit. Body Structures Involved:  1. Nerves  2. Bones  3. Joints  4. Ligaments Body Functions Affected:  1. Neuromusculoskeletal  2. Movement Related Activities and Participation Affected:  1.  General Tasks and Demands  2. Community, Social and [place]   Number of elements (examined above) that affect the Plan of Care: 4+: HIGH COMPLEXITY   CLINICAL PRESENTATION:   Presentation: Evolving clinical presentation with changing clinical characteristics: MODERATE COMPLEXITY   CLINICAL DECISION MAKING:   Outcome Measure: Tool Used: Modified Oswestry Low Back Pain Questionnaire  Score:  Initial: 18/50  Most Recent: X/50 (Date: -- )   Interpretation of Score: Each section is scored on a 0-5 scale, 5 representing the greatest disability. The scores of each section are added together for a total score of 50. Score 0 1-10 11-20 21-30 31-40 41-49 50   Modifier CH CI CJ CK CL CM CN     ? Mobility - Walking and Moving Around:     - CURRENT STATUS: CJ - 20%-39% impaired, limited or restricted    - GOAL STATUS: CI - 1%-19% impaired, limited or restricted    - D/C STATUS:  ---------------To be determined---------------    Medical Necessity:   · Patient is expected to demonstrate progress in strength, range of motion and balance to improve safety during walking, increase distance with walking. .  Reason for Services/Other Comments:  · Patient continues to require skilled intervention due to needs intervention for joint stiffness and postural imbalance; skilled guidance with therex. Use of outcome tool(s) and clinical judgement create a POC that gives a: Questionable prediction of patient's progress: MODERATE COMPLEXITY            TREATMENT:   (In addition to Assessment/Re-Assessment sessions the following treatments were rendered)  Pre-treatment Symptoms/Complaints:  States that pain is no longer shooting, more achy and at low back. At [facility] in [place] low back that radiates to the side of R LE. Pain in is described as sharp.   Pain: Initial:   Pain Intensity 1: 3 8-9/10 Post Session:  Less than 8/10 and able to stand up straighter   Manual:  (15 min)  To improve spinal alignment and mobility:  Muscle Energy Technique for L4/5 dysfunction; grade 2-3 PA mob at L2-5 with pt in prone on elbow and in standing. Trial of fitting pt witjh 1/4 inch heel lift at R LE for improved symmetry, strength. Pt tests stronger with elbow flexion test with 1/4 inch heel lift on R. Therapeutic Exercises: (25 min) to improve mobility and gait. Date:  4/6/18 Date:   Date:     Activity/Exercise Parameters Parameters Parameters   Stand back ext 10x     Stand hip side glide R 10x     Stand gastroc stretch SUSAN 10x  unilatersal     Stand adductor stretch 15 sec x 3 ea side     Side lye hip posterior depression L hip 10x     walking tband under R foot crossed over to L shoulder  Walked ~ 100 feet     Side ways squat walk next         [facility] Portal  Treatment/Session Assessment:    · Response to Treatment: Pt standing more level and able to bend R knee more with gait. Still has moderate trunk sway and may have real leg length discrepancy R < L. Will do trial of ~1/8 inch heel lift in R shoe over weekend. · Compliance with Program/Exercises: Will assess as treatment progresses. · Recommendations/Intent for next treatment session: \"Next visit will focus on initial visit with pool therapy. In addition to addressing lumbar stiffness, please also address R hip EXT/IR stiffness. Pt to have 1 aquatic and 1 land visit for next 8 visits. Will increase pool visits depending on response to sessions. Pt missed pool visit last week because of conflict.   Total Treatment Duration: 40 min  PT Patient Time In/Time Out  Time In: 1235  Time Out: 1320    Joe Harris, PT MSPT

## 2018-04-09 NOTE — PROGRESS NOTES
Shaggy Stiles  : 1940  [place]  Secondary: Malcom Rahman at [place] [id number] [facility], [address], [facility] [place] [address]  [phone]   [phone]          OUTPATIENT PHYSICAL THERAPY:Daily Note and Aquatic 2018    ICD-10: Treatment Diagnosis: RADICULOPATHY, LUMBAR REGION M54.16  Precautions/Allergies:   Potassium   Fall Risk Score: 2 (? 5 = High Risk)  MD Orders: eval and treat, aquatic PT MEDICAL/REFERRING DIAGNOSIS:  RT S1 Radiculopathy   DATE OF ONSET: chronic; new episode in 10/2017  REFERRING PHYSICIAN: Gerald Romberg, MD  RETURN PHYSICIAN APPOINTMENT: not scheduled     INITIAL ASSESSMENT:  Mr. Soren Saenz is a 78year old man with c/o mostly localized LBP with intermittent R lateral LE pain that affects his ability to walk and stand except for short periods. 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 He presents with acquired lumbar levoscoliosis with hips shifted R and stands in slight forward bent posture as well as has R gait antalgia and has + R SLR, R SLUMP tests. Mr. Soren Saenz has PMHx of heart disease and prediabetes and needs to be able to walk for improved health. HE is a good candidate for skilled physical therapy for rehabilitation towards improved independence with functional activities such as returning to maintaining her house and yard. PROBLEM LIST (Impacting functional limitations):  1. Decreased Strength  2. Decreased ADL/Functional Activities  3. Decreased Balance  4. Increased Pain  5. Decreased Flexibility/Joint Mobility INTERVENTIONS PLANNED:  1. Modalities PRN, including ultrasound, estim, and iontophoresis  2. Soft tissue and joint mobilization for ROM and flexibility  3. Stretching, progressive resistive exercises and HEP for return to functional activities.    4. Back Education and Training for body mechanics with activities of daily living  4. If needed, aquatic therapy. TREATMENT PLAN:  Effective Dates: 3/27/2018 TO 5/26/2018 (60 days). Frequency/Duration: 2 times a week for 60 Days. NOTE: Will increased to 3x per week if needed. GOALS: (Goals have been discussed and agreed upon with patient.)  Short-Term Functional Goals: Time Frame: 4 weeks  1. Pt to tolerate 45 minutes of aquatic therapy and be independent with initial level aquatic program.  2. Able to tolerate standing 15 minutes for household activities. 3. Normalized gait. 4. Independent with initial level HEP. Discharge Goals: Time Frame: 8 weeks  1. Oswestry score improves to at least 10 for improved function. 2. Able to return to mowing his lawn with push mower. 3. Demonstrates good understanding of back posture with functional activities he does at home and in his yard. 4. Able to walk at least 15 minutes with least AD for community integration  Rehabilitation Potential For Stated Goals: GUARDED  Regarding Cruz Randhawa Mosley's therapy, I certify that the treatment plan above will be carried out by a therapist or under their direction. Thank you for this referral,  Jenn Alves PT     Referring Physician Signature: Mainor Maldonado MD              Date                    The information in this section was collected on 3/27/18 (except where otherwise noted). HISTORY:   History of Present Injury/Illness (Reason for Referral):  Pt with chronic LBP and had surgery in 7/2015 for back pain, as well as L hip THR in 7/2016. Pt had onset of new episode of LBP around October 2017. MRI shows severe arthritis with mild levo scoliosis. Had 1 epidural injection and pain relief from this lasted for short while. Pt has PMHx of kidney disease which affects medications he can take and surgery for 12 heart stents. He wants to walk but unable to because of LBP.    Past Medical History/Comorbidities: Mr. Aniya Arboleda  has a past medical history of Aortic valve insufficiency (1/12/2016); Bicuspid aortic valve (1/12/2016); Cataract; Chronic kidney disease, stage III (moderate) (5/17/2016); Chronic kidney failure (1/12/2016); Chronic musculoskeletal pain; Coronary artery disease involving native coronary artery without angina pectoris (5/17/2016); DDD (degenerative disc disease); Diabetes mellitus, type 2 ([facility] 75.); GERD (gastroesophageal reflux disease); Gout; History of atrial fibrillation; History of basal cell carcinoma (2010); History of complete eye exam; History of dental examination (08/2016); History of kidney stones; History of MI (myocardial infarction) (1993); Hyperlipidemia; Hypertension; Morbid obesity with BMI of 40.0-44.9, adult ([facility] 75.) (1/12/2016); Murmur (1/12/2016); Prediabetes; Pure hypercholesterolemia (4/22/2016); and 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 Mr. Ioana Flores  has a past surgical history that includes hx angioplasty (0233-1922); hx malignant skin lesion excision; pr cardiac surg procedure unlist; hx heart catheterization; hx urological (1991); hx heent; hx back surgery (8/11/15); and hx cataract removal (Bilateral). Social History/Living Environment:    Lives in 1 story home with wife and has son that lives with them who has epilepsy. Prior Level of Function/Work/Activity:  Chronic pain and was only able to get  Back to walking short distances even after back surgery and hip surgery. Dominant Side:         RIGHT  Other Clinical Tests:          MRI showed severe arthritis with mild levo scoliosis. Previous Treatment Approaches:          Pt with scope for stenosis in lower lumbar in 2015.   Also has had 1 epidural for this episode of LBP  Current Medications:     Current Outpatient Prescriptions: Imdur, (angina) Plavix ( thinner), Eliquis (thinner), Multaq (a fib), Lopressor (BP), Fenofibrate, Crestor (cholesterol), Protonix, Lisinopril, Felodopine (BP), Finasteride (prostate), Tiamterene, Allopurnil (Uric acid), Urocit-k, Travatan (eyes), Vit D3, Calcium       Date Last Reviewed:  4/9/2018   Number of Personal Factors/Comorbidities that affect the Plan of Care: 3+: HIGH COMPLEXITY   EXAMINATION:   Observation/Orthostatic Postural Assessment:    STAND:  Stands with hips shifted R and ~ 10 degrees lumbar forward bent posture  WALK:  R antalgic gait. Walks with R knee almost straight, decreased WB through R LE.  SUPINE:  Mild functional leg length discrepancy observed. Palpation:  Tight SUSAN obliques and intersegmental flexion in lower lumbar  ROM: SUSAN knee, ankle, hip AROM WNL except for R hip extension = 0 degrees, IR ~ 30 degrees    LUMBAR 50%   Flexion 10%    Extension    SB Right -   SB Left -   Rotation R -   Rotation L -             Strength:  See dermatomes below. SUSAN PF = 4/5  Special Tests:  Back pain decreases some with manual traction (done in side lye today). Did not change or increase with hip lateral glide L or lumbar extension reps. Flexibility:   · Hamstring Length (in 90/90 position):R  - 45 degrees L - 35 degrees  · Straight Leg Raise Test: + R  · Norbert Test (2-joint hip flexors): not assessed today  · Prone Knee Bend: not assessed today  · Sathish's Test: not assessed today  · Gastrocnemius: 0 degrees SUSAN  Neurological Screen:  Myotomes: WNL for SUSAN L2-L5 with mild decreased strength S1 with PF   Dermatomes: decreased sensation to R L2,3 to light touch  Reflexes: not assessed today  Neural Tension Tests:   Functional Mobility:   Unable to walk or stand more than ~ 5 minutes. Independent with compensation for pain with activities at home. Balance:  Pt unable to walk on toes because of balance difficulties. Need to further assess at future visit. Body Structures Involved:  1. Nerves  2. Bones  3. Joints  4. Ligaments Body Functions Affected:  1. Neuromusculoskeletal  2. Movement Related Activities and Participation Affected:  1. General Tasks and Demands  2.  Community, Social and [place]   Number of elements (examined above) that affect the Plan of Care: 4+: HIGH COMPLEXITY   CLINICAL PRESENTATION:   Presentation: Evolving clinical presentation with changing clinical characteristics: MODERATE COMPLEXITY   CLINICAL DECISION MAKING:   Outcome Measure: Tool Used: Modified Oswestry Low Back Pain Questionnaire  Score:  Initial: 18/50  Most Recent: X/50 (Date: -- )   Interpretation of Score: Each section is scored on a 0-5 scale, 5 representing the greatest disability. The scores of each section are added together for a total score of 50. Score 0 1-10 11-20 21-30 31-40 41-49 50   Modifier CH CI CJ CK CL CM CN     ? Mobility - Walking and Moving Around:     - CURRENT STATUS: CJ - 20%-39% impaired, limited or restricted    - GOAL STATUS: CI - 1%-19% impaired, limited or restricted    - D/C STATUS:  ---------------To be determined---------------    Medical Necessity:   · Patient is expected to demonstrate progress in strength, range of motion and balance to improve safety during walking, increase distance with walking. .  Reason for Services/Other Comments:  · Patient continues to require skilled intervention due to needs intervention for joint stiffness and postural imbalance; skilled guidance with therex. Use of outcome tool(s) and clinical judgement create a POC that gives a: Questionable prediction of patient's progress: MODERATE COMPLEXITY            TREATMENT:   (In addition to Assessment/Re-Assessment sessions the following treatments were rendered)  Pre-treatment Symptoms/Complaints:   Pt with moderate amount of pain this morning. Pain: Initial:     3/10 lower back Post Session:  Not rated     Therapeutic Exercises: (0 min) started with standing lumbar extension x 10, then side facing to wall hip transverse glide to L x 10, then side lye hip posterior depression x 10 ea side (also including feet going slightly down with hip posterior depression). Aquatic Therapy (45 minutes):  Aquatic treatment performed per flow grid for Decreased muscle strength, Decreased endurance, Decreased range of motion, Decreased activity endurance and Ease of movement. Cues provided for posture and balance. Aquatic Exercise Log       Date  3/29/18 Date  4/9/18 Date   Date   Date     Activity/ Exercise Parameters Parameters Parameters Parameters Parameters   Walking forward 4 6      Walking backward 4 4      Walking sideways 4 4        Marching 4 4        Goose Step 4 4        Tip toes 2 2        Heels 2 2        Lunges        Side step squats        LE Exercises 4. 5' holding rail 4.5  2#        Hip Flex/Ext 10 10        Hip Abd/Add 10 10        Hip IR/ER          Calf raises 10 10        Knee Flex 10 10        Squats          Leg Circles 10/10 10/10        Step Ups        UE Exercises          Squeeze In          Push Down          Pull Down          Bicep/Tricep        Rows/Press outs         Chi Positions          Trunk Rotation        Deep H2O/ Noodles 7' with blue rings Noodle 2#        Stabilization 3 min         Arms only          Legs only Bicycle 2 min Jog 2 min      Cross   Country 2 min 2 min        Scissors 2 min         Crab walk        Lower abdominal   work  [place] 2 min         Cardio          Jogging        Lap   Swimming          Stretches          Hamstrings          Heelcords          Piriformis          Neck          MedBridge Portal  Treatment/Session Assessment:    · Response to Treatment:  Did well with ex. Weak core. Worked on posture during ex and gait. · Compliance with Program/Exercises: Will assess as treatment progresses. · Recommendations/Intent for next treatment session: \"Next visit will focus on land and pool. In addition to addressing lumbar stiffness, please also address R hip EXT/IR stiffness. Pt to have 1 aquatic and 1 land visit for next 8 visits. Will increase pool visits depending on response to sessions.   Total Treatment Duration: 45 min       Karl Barclay, PT

## 2018-04-13 NOTE — THERAPY EVALUATION
Yogi Bravo  : 1940  [place]  [facility] [facility], [address], [facility] [address]  [phone]   [phone]          OUTPATIENT PHYSICAL THERAPY:Daily Note 2018    ICD-10: Treatment Diagnosis: RADICULOPATHY, LUMBAR REGION M54.16  Precautions/Allergies:   Potassium   Fall Risk Score: 2 (? 5 = High Risk)  MD Orders: eval and treat, aquatic PT MEDICAL/REFERRING DIAGNOSIS:  RT S1 Radiculopathy   DATE OF ONSET: chronic; new episode in 10/2017  REFERRING PHYSICIAN: Doreen Kelley MD  RETURN PHYSICIAN APPOINTMENT: not scheduled     INITIAL ASSESSMENT:  Mr. Chuck Pandey is a 78year old man with c/o mostly localized LBP with intermittent R lateral LE pain that affects his ability to walk and stand except for short periods. 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 He presents with acquired lumbar levoscoliosis with hips shifted R and stands in slight forward bent posture as well as has R gait antalgia and has + R SLR, R SLUMP tests. Mr. Chuck Pandey has PMHx of heart disease and prediabetes and needs to be able to walk for improved health. HE is a good candidate for skilled physical therapy for rehabilitation towards improved independence with functional activities such as returning to maintaining her house and yard. PROBLEM LIST (Impacting functional limitations):  1. Decreased Strength  2. Decreased ADL/Functional Activities  3. Decreased Balance  4. Increased Pain  5. Decreased Flexibility/Joint Mobility INTERVENTIONS PLANNED:  1. Modalities PRN, including ultrasound, estim, and iontophoresis  2. Soft tissue and joint mobilization for ROM and flexibility  3. Stretching, progressive resistive exercises and HEP for return to functional activities.    4. Back Education and Training for body mechanics with activities of daily living  4. If needed, aquatic therapy. TREATMENT PLAN:  Effective Dates: 3/27/2018 TO 5/26/2018 (60 days). Frequency/Duration: 2 times a week for 60 Days. NOTE: Will increased to 3x per week if needed. GOALS: (Goals have been discussed and agreed upon with patient.)  Short-Term Functional Goals: Time Frame: 4 weeks  1. Pt to tolerate 45 minutes of aquatic therapy and be independent with initial level aquatic program.  2. Able to tolerate standing 15 minutes for household activities. 3. Normalized gait. 4. Independent with initial level HEP. Discharge Goals: Time Frame: 8 weeks  1. Oswestry score improves to at least 10 for improved function. 2. Able to return to mowing his lawn with push mower. 3. Demonstrates good understanding of back posture with functional activities he does at home and in his yard. 4. Able to walk at least 15 minutes with least AD for community integration  Rehabilitation Potential For Stated Goals: GUARDED  Regarding Adam Manuel Dimitri's therapy, I certify that the treatment plan above will be carried out by a therapist or under their direction. Thank you for this referral,  Jeremie Goins PT     Referring Physician Signature: Lowell Bustamante MD              Date                    The information in this section was collected on 3/27/18 (except where otherwise noted). HISTORY:   History of Present Injury/Illness (Reason for Referral):  Pt with chronic LBP and had surgery in 7/2015 for back pain, as well as L hip THR in 7/2016. Pt had onset of new episode of LBP around October 2017. MRI shows severe arthritis with mild levo scoliosis. Had 1 epidural injection and pain relief from this lasted for short while. Pt has PMHx of kidney disease which affects medications he can take and surgery for 12 heart stents. He wants to walk but unable to because of LBP.    Past Medical History/Comorbidities: Mr. Jovany Mckeon  has a past medical history of Aortic valve insufficiency (1/12/2016); Bicuspid aortic valve (1/12/2016); Cataract; Chronic kidney disease, stage III (moderate) (5/17/2016); Chronic kidney failure (1/12/2016); Chronic musculoskeletal pain; Coronary artery disease involving native coronary artery without angina pectoris (5/17/2016); DDD (degenerative disc disease); Diabetes mellitus, type 2 ([facility] 75.); GERD (gastroesophageal reflux disease); Gout; History of atrial fibrillation; History of basal cell carcinoma (2010); History of complete eye exam; History of dental examination (08/2016); History of kidney stones; History of MI (myocardial infarction) (1993); Hyperlipidemia; Hypertension; Morbid obesity with BMI of 40.0-44.9, adult ([facility] 75.) (1/12/2016); Murmur (1/12/2016); Prediabetes; Pure hypercholesterolemia (4/22/2016); and 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 Mr. Jessi Lerner  has a past surgical history that includes hx angioplasty ([id number]); hx malignant skin lesion excision; pr cardiac surg procedure unlist; hx heart catheterization; hx urological (1991); hx heent; hx back surgery (8/11/15); and hx cataract removal (Bilateral). Social History/Living Environment:    Lives in 1 story home with wife and has son that lives with them who has epilepsy. Prior Level of Function/Work/Activity:  Chronic pain and was only able to get  Back to walking short distances even after back surgery and hip surgery. Dominant Side:         RIGHT  Other Clinical Tests:          MRI showed severe arthritis with mild levo scoliosis. Previous Treatment Approaches:          Pt with scope for stenosis in lower lumbar in 2015.   Also has had 1 epidural for this episode of LBP  Current Medications:     Current Outpatient Prescriptions: Imdur, (angina) Plavix ( thinner), Eliquis (thinner), Multaq (a fib), Lopressor (BP), Fenofibrate, Crestor (cholesterol), Protonix, Lisinopril, Felodopine (BP), Finasteride (prostate), Tiamterene, Allopurnil (Uric acid), Urocit-k, Travatan (eyes), Vit D3, Calcium       Date Last Reviewed:  3/27/18   Number of Personal Factors/Comorbidities that affect the Plan of Care: 3+: HIGH COMPLEXITY   EXAMINATION:   Observation/Orthostatic Postural Assessment:  4/6/18 stand:  Hips  Shifted less to the R and almost standing up straight. STAND:  Stands with hips shifted R and ~ 10 degrees lumbar forward bent posture  WALK:  R antalgic gait. Walks with R knee almost straight, decreased WB through R LE.  SUPINE:  Mild functional leg length discrepancy observed. Palpation:  Tight SUSAN obliques and intersegmental flexion in lower lumbar  ROM: SUSAN knee, ankle, hip AROM WNL except for R hip extension = 0 degrees, IR ~ 30 degrees    LUMBAR 50%   Flexion 10%    Extension    SB Right -   SB Left -   Rotation R -   Rotation L -             Strength:  See dermatomes below. SUSAN PF = 4/5  Special Tests:  Back pain decreases some with manual traction (done in side lye today). Did not change or increase with hip lateral glide L or lumbar extension reps. Flexibility:   · Hamstring Length (in 90/90 position):R  - 45 degrees L - 35 degrees  · Straight Leg Raise Test: + R  · Norbert Test (2-joint hip flexors): not assessed today  · Prone Knee Bend: not assessed today  · Sathish's Test: not assessed today  · Gastrocnemius: 0 degrees SUSAN  Neurological Screen:  Myotomes: WNL for SUSAN L2-L5 with mild decreased strength S1 with PF   Dermatomes: decreased sensation to R L2,3 to light touch  Reflexes: not assessed today  Neural Tension Tests:   Functional Mobility:   Unable to walk or stand more than ~ 5 minutes. Independent with compensation for pain with activities at home. Balance:  Pt unable to walk on toes because of balance difficulties. Need to further assess at future visit. Body Structures Involved:  1. Nerves  2. Bones  3. Joints  4. Ligaments Body Functions Affected:  1. Neuromusculoskeletal  2. Movement Related Activities and Participation Affected:  1.  General Tasks and Demands  2. Community, Social and [place]   Number of elements (examined above) that affect the Plan of Care: 4+: HIGH COMPLEXITY   CLINICAL PRESENTATION:   Presentation: Evolving clinical presentation with changing clinical characteristics: MODERATE COMPLEXITY   CLINICAL DECISION MAKING:   Outcome Measure: Tool Used: Modified Oswestry Low Back Pain Questionnaire  Score:  Initial: 18/50  Most Recent: X/50 (Date: -- )   Interpretation of Score: Each section is scored on a 0-5 scale, 5 representing the greatest disability. The scores of each section are added together for a total score of 50. Score 0 1-10 11-20 21-30 31-40 41-49 50   Modifier CH CI CJ CK CL CM CN     ? Mobility - Walking and Moving Around:     - CURRENT STATUS: CJ - 20%-39% impaired, limited or restricted    - GOAL STATUS: CI - 1%-19% impaired, limited or restricted    - D/C STATUS:  ---------------To be determined---------------    Medical Necessity:   · Patient is expected to demonstrate progress in strength, range of motion and balance to improve safety during walking, increase distance with walking. .  Reason for Services/Other Comments:  · Patient continues to require skilled intervention due to needs intervention for joint stiffness and postural imbalance; skilled guidance with therex. Use of outcome tool(s) and clinical judgement create a POC that gives a: Questionable prediction of patient's progress: MODERATE COMPLEXITY            TREATMENT:   (In addition to Assessment/Re-Assessment sessions the following treatments were rendered)  Pre-treatment Symptoms/Complaints:  Did trial of putting lift in R LE - tried 3 to 4 x but did not notice a difference. Back has had some good days but yesterday was bad. At [facility] in [place] low back that radiates to the side of R LE. Pain in is described as sharp. Pain: Initial:     2/10 today 5-6/10 yesterday Post Session:  1/10.    Manual:  (15 min)  To improve spinal alignment and mobility:  Muscle Energy Technique for L4/5 dysfunction; grade 2-3 PA mob at L2-5 with pt in prone on elbow and in standing. R hip extension/IR mobilization. Second trial of fitting pt witjh 1/8 and 1/4 inch heel lift at R LE for improved symmetry, strength. Pt tests the same today with heel lift vs no heel lift. Therapeutic Exercises: (25 min) to improve mobility and gait. Date:  4/6/18 Date:  4/13/18 Date:     Activity/Exercise Parameters Parameters Parameters   Stand back ext 10x -    Stand hip side glide R 10x 10x    Stand gastroc stretch SUSAN 10x  unilatersal -    Stand adductor stretch 15 sec x 3 ea side 15 sec x 3 ea side    Side lye hip posterior depression L hip 10x L hip 10x    walking tband under R foot crossed over to L shoulder  Walked ~ 100 feet     Side ways squat walk next next    Prone on elbow  Head lift  10x    Sciatic nerve glide  supijne  20x ea side    Hook lye alternate hip flexion  5x ea    Side lye clam shell  10x    Piriformis stretch  L hip  30 sec                          MedBridge Portal  Treatment/Session Assessment:    · Response to Treatment: Pt walking with less of limp in gait and today tested same for heel lift vs none. · Compliance with Program/Exercises: Will assess as treatment progresses. · Recommendations/Intent for next treatment session: \"Next visit will focus on initial visit with pool therapy. In addition to addressing lumbar stiffness, please also address R hip EXT/IR stiffness. Pt to have 1 aquatic and 1 land visit for next 8 visits.   Total Treatment Duration: 40 min   PT Patient Time In/Time Out  Time In: 1230  Time Out: [address], PT [facility]

## 2018-04-17 NOTE — PROGRESS NOTES
Michaela Davis  : 1940  [place]  Secondary: aMlcom Rahman at [place] [id number] [facility], [address], [facility] [address]  [phone]   [phone]          OUTPATIENT PHYSICAL THERAPY:Daily Note and Aquatic 2018    ICD-10: Treatment Diagnosis: RADICULOPATHY, LUMBAR REGION M54.16  Precautions/Allergies:   Potassium   Fall Risk Score: 2 (? 5 = High Risk)  MD Orders: eval and treat, aquatic PT MEDICAL/REFERRING DIAGNOSIS:  RT S1 Radiculopathy   DATE OF ONSET: chronic; new episode in 10/2017  REFERRING PHYSICIAN: Haven Quiroz MD  RETURN PHYSICIAN APPOINTMENT: not scheduled     INITIAL ASSESSMENT:  Mr. Lima Garcia is a 78year old man with c/o mostly localized LBP with intermittent R lateral LE pain that affects his ability to walk and stand except for short periods. 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 He presents with acquired lumbar levoscoliosis with hips shifted R and stands in slight forward bent posture as well as has R gait antalgia and has + R SLR, R SLUMP tests. Mr. Lima Garcia has PMHx of heart disease and prediabetes and needs to be able to walk for improved health. HE is a good candidate for skilled physical therapy for rehabilitation towards improved independence with functional activities such as returning to maintaining her house and yard. PROBLEM LIST (Impacting functional limitations):  1. Decreased Strength  2. Decreased ADL/Functional Activities  3. Decreased Balance  4. Increased Pain  5. Decreased Flexibility/Joint Mobility INTERVENTIONS PLANNED:  1. Modalities PRN, including ultrasound, estim, and iontophoresis  2. Soft tissue and joint mobilization for ROM and flexibility  3. Stretching, progressive resistive exercises and HEP for return to functional activities.    4. Back Education and Training for body mechanics with activities of daily living  4. If needed, aquatic therapy. TREATMENT PLAN:  Effective Dates: 3/27/2018 TO 5/26/2018 (60 days). Frequency/Duration: 2 times a week for 60 Days. NOTE: Will increased to 3x per week if needed. GOALS: (Goals have been discussed and agreed upon with patient.)  Short-Term Functional Goals: Time Frame: 4 weeks  1. Pt to tolerate 45 minutes of aquatic therapy and be independent with initial level aquatic program.  2. Able to tolerate standing 15 minutes for household activities. 3. Normalized gait. 4. Independent with initial level HEP. Discharge Goals: Time Frame: 8 weeks  1. Oswestry score improves to at least 10 for improved function. 2. Able to return to mowing his lawn with push mower. 3. Demonstrates good understanding of back posture with functional activities he does at home and in his yard. 4. Able to walk at least 15 minutes with least AD for community integration  Rehabilitation Potential For Stated Goals: GUARDED  Regarding Ayesha He Mosley's therapy, I certify that the treatment plan above will be carried out by a therapist or under their direction. Thank you for this referral,  Chelsea Greenwood PTA     Referring Physician Signature: Anne Gray MD              Date                    The information in this section was collected on 3/27/18 (except where otherwise noted). HISTORY:   History of Present Injury/Illness (Reason for Referral):  Pt with chronic LBP and had surgery in 7/2015 for back pain, as well as L hip THR in 7/2016. Pt had onset of new episode of LBP around October 2017. MRI shows severe arthritis with mild levo scoliosis. Had 1 epidural injection and pain relief from this lasted for short while. Pt has PMHx of kidney disease which affects medications he can take and surgery for 12 heart stents. He wants to walk but unable to because of LBP.    Past Medical History/Comorbidities: Mr. Heena Salinas  has a past medical history of Aortic valve insufficiency (1/12/2016); Bicuspid aortic valve (1/12/2016); Cataract; Chronic kidney disease, stage III (moderate) (5/17/2016); Chronic kidney failure (1/12/2016); Chronic musculoskeletal pain; Coronary artery disease involving native coronary artery without angina pectoris (5/17/2016); DDD (degenerative disc disease); Diabetes mellitus, type 2 ([facility] 75.); GERD (gastroesophageal reflux disease); Gout; History of atrial fibrillation; History of basal cell carcinoma (2010); History of complete eye exam; History of dental examination (08/2016); History of kidney stones; History of MI (myocardial infarction) (1993); Hyperlipidemia; Hypertension; Morbid obesity with BMI of 40.0-44.9, adult ([facility] 75.) (1/12/2016); Murmur (1/12/2016); Prediabetes; Pure hypercholesterolemia (4/22/2016); and 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 Mr. Soren Saenz  has a past surgical history that includes hx angioplasty (8312-4138); hx malignant skin lesion excision; pr cardiac surg procedure unlist; hx heart catheterization; hx urological (1991); hx heent; hx back surgery (8/11/15); and hx cataract removal (Bilateral). Social History/Living Environment:    Lives in 1 story home with wife and has son that lives with them who has epilepsy. Prior Level of Function/Work/Activity:  Chronic pain and was only able to get  Back to walking short distances even after back surgery and hip surgery. Dominant Side:         RIGHT  Other Clinical Tests:          MRI showed severe arthritis with mild levo scoliosis. Previous Treatment Approaches:          Pt with scope for stenosis in lower lumbar in 2015.   Also has had 1 epidural for this episode of LBP  Current Medications:     Current Outpatient Prescriptions: Imdur, (angina) Plavix ( thinner), Eliquis (thinner), Multaq (a fib), Lopressor (BP), Fenofibrate, Crestor (cholesterol), Protonix, Lisinopril, Felodopine (BP), Finasteride (prostate), Tiamterene, Allopurnil (Uric acid), Urocit-k, Travatan (eyes), Vit D3, Calcium       Date Last Reviewed:  4/18/2018   Number of Personal Factors/Comorbidities that affect the Plan of Care: 3+: HIGH COMPLEXITY   EXAMINATION:   Observation/Orthostatic Postural Assessment:    STAND:  Stands with hips shifted R and ~ 10 degrees lumbar forward bent posture  WALK:  R antalgic gait. Walks with R knee almost straight, decreased WB through R LE.  SUPINE:  Mild functional leg length discrepancy observed. Palpation:  Tight SUSAN obliques and intersegmental flexion in lower lumbar  ROM: SUSAN knee, ankle, hip AROM WNL except for R hip extension = 0 degrees, IR ~ 30 degrees    LUMBAR 50%   Flexion 10%    Extension    SB Right -   SB Left -   Rotation R -   Rotation L -             Strength:  See dermatomes below. SUSAN PF = 4/5  Special Tests:  Back pain decreases some with manual traction (done in side lye today). Did not change or increase with hip lateral glide L or lumbar extension reps. Flexibility:   · Hamstring Length (in 90/90 position):R  - 45 degrees L - 35 degrees  · Straight Leg Raise Test: + R  · Norbert Test (2-joint hip flexors): not assessed today  · Prone Knee Bend: not assessed today  · Sathish's Test: not assessed today  · Gastrocnemius: 0 degrees SUSAN  Neurological Screen:  Myotomes: WNL for SUSAN L2-L5 with mild decreased strength S1 with PF   Dermatomes: decreased sensation to R L2,3 to light touch  Reflexes: not assessed today  Neural Tension Tests:   Functional Mobility:   Unable to walk or stand more than ~ 5 minutes. Independent with compensation for pain with activities at home. Balance:  Pt unable to walk on toes because of balance difficulties. Need to further assess at future visit. Body Structures Involved:  1. Nerves  2. Bones  3. Joints  4. Ligaments Body Functions Affected:  1. Neuromusculoskeletal  2. Movement Related Activities and Participation Affected:  1. General Tasks and Demands  2.  Community, Social and [place]   Number of elements (examined above) that affect the Plan of Care: 4+: HIGH COMPLEXITY   CLINICAL PRESENTATION:   Presentation: Evolving clinical presentation with changing clinical characteristics: MODERATE COMPLEXITY   CLINICAL DECISION MAKING:   Outcome Measure: Tool Used: Modified Oswestry Low Back Pain Questionnaire  Score:  Initial: 18/50  Most Recent: X/50 (Date: -- )   Interpretation of Score: Each section is scored on a 0-5 scale, 5 representing the greatest disability. The scores of each section are added together for a total score of 50. Score 0 1-10 11-20 21-30 31-40 41-49 50   Modifier CH CI CJ CK CL CM CN     ? Mobility - Walking and Moving Around:     - CURRENT STATUS: CJ - 20%-39% impaired, limited or restricted    - GOAL STATUS: CI - 1%-19% impaired, limited or restricted    - D/C STATUS:  ---------------To be determined---------------    Medical Necessity:   · Patient is expected to demonstrate progress in strength, range of motion and balance to improve safety during walking, increase distance with walking. .  Reason for Services/Other Comments:  · Patient continues to require skilled intervention due to needs intervention for joint stiffness and postural imbalance; skilled guidance with therex. Use of outcome tool(s) and clinical judgement create a POC that gives a: Questionable prediction of patient's progress: MODERATE COMPLEXITY            TREATMENT:   (In addition to Assessment/Re-Assessment sessions the following treatments were rendered)  Pre-treatment Symptoms/Complaints:   Pt with moderate amount of pain this morning. Pain: Initial:     3/10 lower back Post Session:  Not rated     Therapeutic Exercises: (0 min) started with standing lumbar extension x 10, then side facing to wall hip transverse glide to L x 10, then side lye hip posterior depression x 10 ea side (also including feet going slightly down with hip posterior depression). Aquatic Therapy (45 minutes):  Aquatic treatment performed per flow grid for Decreased muscle strength, Decreased endurance, Decreased range of motion, Decreased activity endurance and Ease of movement. Cues provided for posture and balance. Aquatic Exercise Log       Date  3/29/18 Date  4/9/18 Date  4/17/18 Date   Date     Activity/ Exercise Parameters Parameters Parameters Parameters Parameters   Walking forward 4 6 6     Walking backward 4 4 6     Walking sideways 4 4 6       Marching 4 4 6       Goose Step 4 4 6       Tip toes 2 2 2       Heels 2 2 2       Lunges        Side step squats        LE Exercises 4. 5' holding rail 4.5  2# 4.5' 2#       Hip Flex/Ext 10 10 15       Hip Abd/Add 10 10 15       Hip IR/ER          Calf raises 10 10 15       Knee Flex 10 10 15       Squats          Leg Circles 10/10 10/10 15/15       Step Ups        UE Exercises          Squeeze In          Push Down          Pull Down          Bicep/Tricep        Rows/Press outs         Chi Positions          Trunk Rotation        Deep H2O/ Noodles 7' with blue rings Noodle 2# Blue rings 2# wts       Stabilization 3 min         Arms only          Legs only Bicycle 2 min Jog 2 min Bicycle 2 min     Cross   Country 2 min 2 min 2 min       Scissors 2 min  2 min       Crab walk        Lower abdominal   work  DTKC 2 min  DKTC 2 min       Cardio          Jogging        Lap   Swimming          Stretches          Hamstrings          Heelcords          Piriformis          Neck          Med[employer] Portal  Treatment/Session Assessment:    · Response to Treatment:  Continues to do well with exercises. · Compliance with Program/Exercises: Will assess as treatment progresses. · Recommendations/Intent for next treatment session: \"Next visit will focus on land and pool. In addition to addressing lumbar stiffness, please also address R hip EXT/IR stiffness. Pt to have 1 aquatic and 1 land visit for next 8 visits. Will increase pool visits depending on response to sessions.   Total Treatment Duration: 45 min     Time in:  1000  Time out: [address] [place] [facility], P[facility]

## 2018-04-20 NOTE — THERAPY EVALUATION
Dave Deluna  : 1940  [place]  Secondary: Malcom Rahman at [facility]  [address], [address], [facility] [address]  [phone]   [phone]          OUTPATIENT PHYSICAL THERAPY:Daily Note 2018    ICD-10: Treatment Diagnosis: RADICULOPATHY, LUMBAR REGION M54.16  Precautions/Allergies:   Potassium   Fall Risk Score: 2 (? 5 = High Risk)  MD Orders: eval and treat, aquatic PT MEDICAL/REFERRING DIAGNOSIS:  RT S1 Radiculopathy   DATE OF ONSET: chronic; new episode in 10/2017  REFERRING PHYSICIAN: Shital Glez MD  RETURN PHYSICIAN APPOINTMENT: not scheduled     INITIAL ASSESSMENT:  Mr. Lali Le is a 78year old man with c/o mostly localized LBP with intermittent R lateral LE pain that affects his ability to walk and stand except for short periods. 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 He presents with acquired lumbar levoscoliosis with hips shifted R and stands in slight forward bent posture as well as has R gait antalgia and has + R SLR, R SLUMP tests. Mr. Lali Le has PMHx of heart disease and prediabetes and needs to be able to walk for improved health. HE is a good candidate for skilled physical therapy for rehabilitation towards improved independence with functional activities such as returning to maintaining her house and yard. PROBLEM LIST (Impacting functional limitations):  1. Decreased Strength  2. Decreased ADL/Functional Activities  3. Decreased Balance  4. Increased Pain  5. Decreased Flexibility/Joint Mobility INTERVENTIONS PLANNED:  1. Modalities PRN, including ultrasound, estim, and iontophoresis  2. Soft tissue and joint mobilization for ROM and flexibility  3. Stretching, progressive resistive exercises and HEP for return to functional activities.    4. Back Education and Training for body mechanics with activities of daily living  4. If needed, aquatic therapy. TREATMENT PLAN:  Effective Dates: 3/27/2018 TO 5/26/2018 (60 days). Frequency/Duration: 2 times a week for 60 Days. NOTE: Will increased to 3x per week if needed. GOALS: (Goals have been discussed and agreed upon with patient.)  Short-Term Functional Goals: Time Frame: 4 weeks  1. Pt to tolerate 45 minutes of aquatic therapy and be independent with initial level aquatic program.  2. Able to tolerate standing 15 minutes for household activities. 3. Normalized gait. 4. Independent with initial level HEP. Discharge Goals: Time Frame: 8 weeks  1. Oswestry score improves to at least 10 for improved function. 2. Able to return to mowing his lawn with push mower. 3. Demonstrates good understanding of back posture with functional activities he does at home and in his yard. 4. Able to walk at least 15 minutes with least AD for community integration  Rehabilitation Potential For Stated Goals: GUARDED  Regarding Helenadipika Onofre Mosley's therapy, I certify that the treatment plan above will be carried out by a therapist or under their direction. Thank you for this referral,  Adin Troy PT     Referring Physician Signature: Kaykay Pandey MD              Date                    The information in this section was collected on 3/27/18 (except where otherwise noted). HISTORY:   History of Present Injury/Illness (Reason for Referral):  Pt with chronic LBP and had surgery in 7/2015 for back pain, as well as L hip THR in 7/2016. Pt had onset of new episode of LBP around October 2017. MRI shows severe arthritis with mild levo scoliosis. Had 1 epidural injection and pain relief from this lasted for short while. Pt has PMHx of kidney disease which affects medications he can take and surgery for 12 heart stents. He wants to walk but unable to because of LBP.    Past Medical History/Comorbidities: Mr. Alen Ceron  has a past medical history of Aortic valve insufficiency (1/12/2016); Bicuspid aortic valve (1/12/2016); Cataract; Chronic kidney disease, stage III (moderate) (5/17/2016); Chronic kidney failure (1/12/2016); Chronic musculoskeletal pain; Coronary artery disease involving native coronary artery without angina pectoris (5/17/2016); DDD (degenerative disc disease); Diabetes mellitus, type 2 ([facility] 75.); GERD (gastroesophageal reflux disease); Gout; History of atrial fibrillation; History of basal cell carcinoma (2010); History of complete eye exam; History of dental examination (08/2016); History of kidney stones; History of MI (myocardial infarction) (1993); Hyperlipidemia; Hypertension; Morbid obesity with BMI of 40.0-44.9, adult ([facility] 75.) (1/12/2016); Murmur (1/12/2016); Prediabetes; Pure hypercholesterolemia (4/22/2016); and 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 Mr. Rochelle Link  has a past surgical history that includes hx angioplasty ([id number]); hx malignant skin lesion excision; pr cardiac surg procedure unlist; hx heart catheterization; hx urological (1991); hx heent; hx back surgery (8/11/15); and hx cataract removal (Bilateral). Social History/Living Environment:    Lives in 1 story home with wife and has son that lives with them who has epilepsy. Prior Level of Function/Work/Activity:  Chronic pain and was only able to get  Back to walking short distances even after back surgery and hip surgery. Dominant Side:         RIGHT  Other Clinical Tests:          MRI showed severe arthritis with mild levo scoliosis. Previous Treatment Approaches:          Pt with scope for stenosis in lower lumbar in 2015.   Also has had 1 epidural for this episode of LBP  Current Medications:     Current Outpatient Prescriptions: Imdur, (angina) Plavix ( thinner), Eliquis (thinner), Multaq (a fib), Lopressor (BP), Fenofibrate, Crestor (cholesterol), Protonix, Lisinopril, Felodopine (BP), Finasteride (prostate), Tiamterene, Allopurnil (Uric acid), Urocit-k, Travatan (eyes), Vit D3, Calcium       Date Last Reviewed:  3/27/18   Number of Personal Factors/Comorbidities that affect the Plan of Care: 3+: HIGH COMPLEXITY   EXAMINATION:   Observation/Orthostatic Postural Assessment:  4/20/18 stand:  Hips  Shifted less to the R but still trunk rotated  And SB L.  STAND:  Stands with hips shifted R and ~ 10 degrees lumbar forward bent posture  WALK:  R antalgic gait. Walks with R knee almost straight, decreased WB through R LE.  SUPINE:  Mild functional leg length discrepancy observed. Palpation: 4/20/18  Tight SUSAN obliques and mid thoracic, lower R lumbar extension, SB R mobility. ROM: SUSAN knee, ankle, hip AROM WNL except for R hip extension = 0 degrees, IR ~ 30 degrees    LUMBAR 50%   Flexion 10%    Extension    SB Right -   SB Left -   Rotation R -   Rotation L -             Strength:  See dermatomes below. SUSAN PF = 4/5  Special Tests:  Back pain decreases some with manual traction (done in side lye today). Did not change or increase with hip lateral glide L or lumbar extension reps. Flexibility:   · Hamstring Length (in 90/90 position):R  - 45 degrees L - 35 degrees  · Straight Leg Raise Test: + R  · Norbert Test (2-joint hip flexors): not assessed today  · Prone Knee Bend: not assessed today  · Sathish's Test: not assessed today  · Gastrocnemius: 0 degrees SUSAN  Neurological Screen:  Myotomes: WNL for SUSAN L2-L5 with mild decreased strength S1 with PF   Dermatomes: decreased sensation to R L2,3 to light touch  Reflexes: not assessed today  Neural Tension Tests:   Functional Mobility:   Unable to walk or stand more than ~ 5 minutes. Independent with compensation for pain with activities at home. Balance:  Pt unable to walk on toes because of balance difficulties. Need to further assess at future visit. Body Structures Involved:  1. Nerves  2. Bones  3. Joints  4. Ligaments Body Functions Affected:  1. Neuromusculoskeletal  2.  Movement Related Activities and Participation Affected:  1. General Tasks and Demands  2. Community, Social and [place]   Number of elements (examined above) that affect the Plan of Care: 4+: HIGH COMPLEXITY   CLINICAL PRESENTATION:   Presentation: Evolving clinical presentation with changing clinical characteristics: MODERATE COMPLEXITY   CLINICAL DECISION MAKING:   Outcome Measure: Tool Used: Modified Oswestry Low Back Pain Questionnaire  Score:  Initial: 18/50  Most Recent: X/50 (Date: -- )   Interpretation of Score: Each section is scored on a 0-5 scale, 5 representing the greatest disability. The scores of each section are added together for a total score of 50. Score 0 1-10 11-20 21-30 31-40 41-49 50   Modifier CH CI CJ CK CL CM CN     ? Mobility - Walking and Moving Around:     - CURRENT STATUS: CJ - 20%-39% impaired, limited or restricted    - GOAL STATUS: CI - 1%-19% impaired, limited or restricted    - D/C STATUS:  ---------------To be determined---------------    Medical Necessity:   · Patient is expected to demonstrate progress in strength, range of motion and balance to improve safety during walking, increase distance with walking. .  Reason for Services/Other Comments:  · Patient continues to require skilled intervention due to needs intervention for joint stiffness and postural imbalance; skilled guidance with therex. Use of outcome tool(s) and clinical judgement create a POC that gives a: Questionable prediction of patient's progress: MODERATE COMPLEXITY            TREATMENT:   (In addition to Assessment/Re-Assessment sessions the following treatments were rendered)  Pre-treatment Symptoms/Complaints:  Doing OK today but Wednesday had increased LBP, hip pain and had to take medication. Pain: Initial:     not rated today but said low pain Post Session:  Pain with walking he had no longer there.    Manual:  (30 min)  myofascial release for SUSAN obliques, R hip extension and IR stretching, L4,5 lumbar extension and SB R functional mobilization in standing, and SB R in side lye L, Mid thoracic SB R grade 2-3 mob, Muscle Energy Technique for T 8 dysfunction. Therapeutic Exercises: (10 min) to improve mobility and gait. Date:  4/6/18 Date:  4/13/18 Date:  4/20/18   Activity/Exercise Parameters Parameters Parameters   Stand back ext 10x - Stand pelvis glide forward 10x, forward = rot L 10x   Stand hip side glide R 10x 10x    Stand gastroc stretch SUSAN 10x  unilatersal -    Stand adductor stretch 15 sec x 3 ea side 15 sec x 3 ea side    Side lye hip posterior depression L hip 10x L hip 10x    walking tband under R foot crossed over to L shoulder  Walked ~ 100 feet     Side ways squat walk next next    Prone on elbow  Head lift  10x    Sciatic nerve glide  supijne  20x ea side    Hook lye alternate hip flexion  5x ea    Side lye clam shell  10x    Piriformis stretch  L hip  30 sec    Sitting UTR R   gaxe forward 10x 2                   [facility] Portal  Treatment/Session Assessment:    · Response to Treatment: Pt with improvement in trunk SB L by session end as well as rot L-- hip lateral glide L with gait had some improvement as well. · Compliance with Program/Exercises: Will assess as treatment progresses. · Recommendations/Intent for next treatment session: \"Next visit will focus on initial visit with pool therapy. for next 2 visits. Please also focus on improving trunk SB R mobility, pelvis glide L mobility. Pt will return to land after those 2 visits.   Total Treatment Duration: 40 min   PT Patient Time In/Time Out  Time In: 1230  Time Out: [address] [facility], PT MSPT

## 2018-04-23 NOTE — THERAPY EVALUATION
Joel Hayes  : 1940  [place]  Secondary: Malcom Rahman at [facility] [address], [address], [address] [facility]  [phone]   [phone]          OUTPATIENT PHYSICAL THERAPY:Daily Note 2018    ICD-10: Treatment Diagnosis: RADICULOPATHY, LUMBAR REGION M54.16  Precautions/Allergies:   Potassium   Fall Risk Score: 2 (? 5 = High Risk)  MD Orders: eval and treat, aquatic PT MEDICAL/REFERRING DIAGNOSIS:  RT S1 Radiculopathy   DATE OF ONSET: chronic; new episode in 10/2017  REFERRING PHYSICIAN: Brian Robbins MD  RETURN PHYSICIAN APPOINTMENT: not scheduled     INITIAL ASSESSMENT:  Mr. Jovan Dominguez is a 78year old man with c/o mostly localized LBP with intermittent R lateral LE pain that affects his ability to walk and stand except for short periods. 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 He presents with acquired lumbar levoscoliosis with hips shifted R and stands in slight forward bent posture as well as has R gait antalgia and has + R SLR, R SLUMP tests. Mr. Jovan Dominguez has PMHx of heart disease and prediabetes and needs to be able to walk for improved health. HE is a good candidate for skilled physical therapy for rehabilitation towards improved independence with functional activities such as returning to maintaining her house and yard. PROBLEM LIST (Impacting functional limitations):  1. Decreased Strength  2. Decreased ADL/Functional Activities  3. Decreased Balance  4. Increased Pain  5. Decreased Flexibility/Joint Mobility INTERVENTIONS PLANNED:  1. Modalities PRN, including ultrasound, estim, and iontophoresis  2. Soft tissue and joint mobilization for ROM and flexibility  3. Stretching, progressive resistive exercises and HEP for return to functional activities.    4. Back Education and Training for body mechanics with activities of daily living  4. If needed, aquatic therapy. TREATMENT PLAN:  Effective Dates: 3/27/2018 TO 5/26/2018 (60 days). Frequency/Duration: 2 times a week for 60 Days. NOTE: Will increased to 3x per week if needed. GOALS: (Goals have been discussed and agreed upon with patient.)  Short-Term Functional Goals: Time Frame: 4 weeks  1. Pt to tolerate 45 minutes of aquatic therapy and be independent with initial level aquatic program.  2. Able to tolerate standing 15 minutes for household activities. 3. Normalized gait. 4. Independent with initial level HEP. Discharge Goals: Time Frame: 8 weeks  1. Oswestry score improves to at least 10 for improved function. 2. Able to return to mowing his lawn with push mower. 3. Demonstrates good understanding of back posture with functional activities he does at home and in his yard. 4. Able to walk at least 15 minutes with least AD for community integration  Rehabilitation Potential For Stated Goals: GUARDED  Regarding Hilda Mosley's therapy, I certify that the treatment plan above will be carried out by a therapist or under their direction. Thank you for this referral,  Esmer Conner PT     Referring Physician Signature: Ev Macias MD              Date                    The information in this section was collected on 3/27/18 (except where otherwise noted). HISTORY:   History of Present Injury/Illness (Reason for Referral):  Pt with chronic LBP and had surgery in 7/2015 for back pain, as well as L hip THR in 7/2016. Pt had onset of new episode of LBP around October 2017. MRI shows severe arthritis with mild levo scoliosis. Had 1 epidural injection and pain relief from this lasted for short while. Pt has PMHx of kidney disease which affects medications he can take and surgery for 12 heart stents. He wants to walk but unable to because of LBP.    Past Medical History/Comorbidities: Mr. Miranda Adams  has a past medical history of Aortic valve insufficiency (1/12/2016); Bicuspid aortic valve (1/12/2016); Cataract; Chronic kidney disease, stage III (moderate) (5/17/2016); Chronic kidney failure (1/12/2016); Chronic musculoskeletal pain; Coronary artery disease involving native coronary artery without angina pectoris (5/17/2016); DDD (degenerative disc disease); Diabetes mellitus, type 2 ([facility] GERD (gastroesophageal reflux disease); Gout; History of atrial fibrillation; History of basal cell carcinoma (2010); History of complete eye exam; History of dental examination (08/2016); History of kidney stones; History of MI (myocardial infarction) (1993); Hyperlipidemia; Hypertension; Morbid obesity with BMI of 40.0-44.9, adult ([facility] (1/12/2016); Murmur (1/12/2016); Prediabetes; Pure hypercholesterolemia (4/22/2016); and 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 Mr. Miranda Adams  has a past surgical history that includes hx angioplasty (5889-2320); hx malignant skin lesion excision; pr cardiac surg procedure unlist; hx heart catheterization; hx urological (1991); hx heent; hx back surgery (8/11/15); and hx cataract removal (Bilateral). Social History/Living Environment:    Lives in 1 story home with wife and has son that lives with them who has epilepsy. Prior Level of Function/Work/Activity:  Chronic pain and was only able to get  Back to walking short distances even after back surgery and hip surgery. Dominant Side:         RIGHT  Other Clinical Tests:          MRI showed severe arthritis with mild levo scoliosis. Previous Treatment Approaches:          Pt with scope for stenosis in lower lumbar in 2015.   Also has had 1 epidural for this episode of LBP  Current Medications:     Current Outpatient Prescriptions: Imdur, (angina) Plavix ( thinner), Eliquis (thinner), Multaq (a fib), Lopressor (BP), Fenofibrate, Crestor (cholesterol), Protonix, Lisinopril, Felodopine (BP), Finasteride (prostate), Tiamterene, Allopurnil (Uric acid), Urocit-k, Travatan (eyes), Vit D3, Calcium       Date Last Reviewed:  4/23/2018   Number of Personal Factors/Comorbidities that affect the Plan of Care: 3+: HIGH COMPLEXITY   EXAMINATION:   Observation/Orthostatic Postural Assessment:  4/20/18 stand:  Hips  Shifted less to the R but still trunk rotated  And SB L.  STAND:  Stands with hips shifted R and ~ 10 degrees lumbar forward bent posture  WALK:  R antalgic gait. Walks with R knee almost straight, decreased WB through R LE.  SUPINE:  Mild functional leg length discrepancy observed. Palpation: 4/20/18  Tight SUSAN obliques and mid thoracic, lower R lumbar extension, SB R mobility. ROM: SUSAN knee, ankle, hip AROM WNL except for R hip extension = 0 degrees, IR ~ 30 degrees    LUMBAR 50%   Flexion 10%    Extension    SB Right -   SB Left -   Rotation R -   Rotation L -             Strength:  See dermatomes below. SUSAN PF = 4/5  Special Tests:  Back pain decreases some with manual traction (done in side lye today). Did not change or increase with hip lateral glide L or lumbar extension reps. Flexibility:   · Hamstring Length (in 90/90 position):R  - 45 degrees L - 35 degrees  · Straight Leg Raise Test: + R  · Norbert Test (2-joint hip flexors): not assessed today  · Prone Knee Bend: not assessed today  · Sathish's Test: not assessed today  · Gastrocnemius: 0 degrees SUSAN  Neurological Screen:  Myotomes: WNL for SUSAN L2-L5 with mild decreased strength S1 with PF   Dermatomes: decreased sensation to R L2,3 to light touch  Reflexes: not assessed today  Neural Tension Tests:   Functional Mobility:   Unable to walk or stand more than ~ 5 minutes. Independent with compensation for pain with activities at home. Balance:  Pt unable to walk on toes because of balance difficulties. Need to further assess at future visit. Body Structures Involved:  1. Nerves  2. Bones  3. Joints  4. Ligaments Body Functions Affected:  1. Neuromusculoskeletal  2.  Movement Related Activities and Participation Affected:  1. General Tasks and Demands  2. Community, Social and [place]   Number of elements (examined above) that affect the Plan of Care: 4+: HIGH COMPLEXITY   CLINICAL PRESENTATION:   Presentation: Evolving clinical presentation with changing clinical characteristics: MODERATE COMPLEXITY   CLINICAL DECISION MAKING:   Outcome Measure: Tool Used: Modified Oswestry Low Back Pain Questionnaire  Score:  Initial: 18/50  Most Recent: X/50 (Date: -- )   Interpretation of Score: Each section is scored on a 0-5 scale, 5 representing the greatest disability. The scores of each section are added together for a total score of 50. Score 0 1-10 11-20 21-30 31-40 41-49 50   Modifier CH CI CJ CK CL CM CN     ? Mobility - Walking and Moving Around:     - CURRENT STATUS: CJ - 20%-39% impaired, limited or restricted    - GOAL STATUS: CI - 1%-19% impaired, limited or restricted    - D/C STATUS:  ---------------To be determined---------------    Medical Necessity:   · Patient is expected to demonstrate progress in strength, range of motion and balance to improve safety during walking, increase distance with walking. .  Reason for Services/Other Comments:  · Patient continues to require skilled intervention due to needs intervention for joint stiffness and postural imbalance; skilled guidance with therex. Use of outcome tool(s) and clinical judgement create a POC that gives a: Questionable prediction of patient's progress: MODERATE COMPLEXITY            TREATMENT:   (In addition to Assessment/Re-Assessment sessions the following treatments were rendered)  Pre-treatment Symptoms/Complaints:  I was hurting like crazy yesterday but a good day today. Today is the best day I have had in a while. Pain: Initial:     0/10 Post Session:  0/10   M        Aquatic Therapy (45 minutes):  Aquatic treatment performed per flow grid for Decreased muscle strength, Decreased range of motion, Decreased activity endurance, Decompression and Ease of movement. Cues provided for posture, ex and gait. Aquatic Exercise Log       Date  4/23 Date   Date   Date   Date     Activity/ Exercise Parameters Parameters Parameters Parameters Parameters   Walking forward 6       Walking backward 6       Walking sideways 6         Marching 6         Goose Step 6         Tip toes 3         Heels 3         Lunges        Side step squats        LE Exercises 2#         Hip Flex/Ext 15         Hip Abd/Add 15         Hip IR/ER          Calf raises 15         Knee Flex 10 with stretch         Squats          Leg Circles 10/10         Step Ups 10       UE Exercises          Squeeze In          Push Down          Pull Down          Bicep/Tricep        Rows/Press outs         Chi Positions          R SB with L pelvic side glide 15 x with dumbell floats       Deep H2O/ Noodles Noodle 4#         Stabilization          Arms only          Legs only Jog 2 min       Cross   Country 2 min         Scissors 2 min         Crab walk        Lower abdominal   work           Cardio          Jogging        Lap   Swimming          Stretches          Hamstrings 10 x 5 sec         Heelcords 5 x 10 sec         Piriformis          quads 5 x 10 sec         [employer] Portal  Treatment/Session Assessment:    · Response to Treatment: Did well with all pool activities. Tight HS, quads, calves. · Compliance with Program/Exercises: Will assess as treatment progresses. · Recommendations/Intent for next treatment session: \"Next visit will focus on initial visit with pool therapy. for next 1 visit. Please also focus on improving trunk SB R mobility, pelvis glide L mobility. Pt will return to land after those 2 visits.   Total Treatment Duration: 45 min   PT Patient Time In/Time Out  Time In: 1230  Time Out: 0115    Katherin Maria, PT MSPT

## 2018-05-06 NOTE — ED TRIAGE NOTES
PT arrived to ED c/o  palpitations for past several days that have become worse today.  PT states he has a Hx of a fib

## 2018-05-06 NOTE — ED NOTES
I have reviewed discharge instructions with the patient and spouse. The patient and spouse verbalized understanding. Patient left ED via Discharge Method: ambulatory to Home with wife. Opportunity for questions and clarification provided. Patient given 1 scripts. To continue your aftercare when you leave the hospital, you may receive an automated call from our care team to check in on how you are doing. This is a free service and part of our promise to provide the best care and service to meet your aftercare needs.  If you have questions, or wish to unsubscribe from this service please call [phone]. Thank you for Choosing our [facility].

## 2018-05-06 NOTE — DISCHARGE INSTRUCTIONS
Palpitations: Care Instructions  Your Care Instructions    Heart palpitations are the uncomfortable sensation that your heart is beating fast or irregularly. You might feel pounding or fluttering in your chest. It might feel like your heart is skipping a beat. Although palpitations may be caused by a heart problem, they also occur because of stress, fatigue, or use of alcohol, caffeine, or nicotine. Many medicines, including diet pills, antihistamines, decongestants, and some herbal products, can cause heart palpitations. Nearly everyone has palpitations from time to time. Depending on your symptoms, your doctor may need to do more tests to try to find the cause of your palpitations. Follow-up care is a key part of your treatment and safety. Be sure to make and go to all appointments, and call your doctor if you are having problems. It's also a good idea to know your test results and keep a list of the medicines you take. How can you care for yourself at home? · Avoid caffeine, nicotine, and excess alcohol. · Do not take illegal drugs, such as methamphetamines and cocaine. · Do not take weight loss or diet medicines unless you talk with your doctor first.  · Get plenty of sleep. · Do not overeat. · If you have palpitations again, take deep breaths and try to relax. This may slow a racing heart. · If you start to feel lightheaded, lie down to avoid injuries that might result if you pass out and fall down. · Keep a record of your palpitations and bring it to your next doctor's appointment. Write down:  ¨ The date and time. ¨ Your pulse. (If your heart is beating fast, it may be hard to count your pulse.)  ¨ What you were doing when the palpitations started. ¨ How long the palpitations lasted. ¨ Any other symptoms. · If an activity causes palpitations, slow down or stop. Talk to your doctor before you do that activity again. · Take your medicines exactly as prescribed.  Call your doctor if you think you are having a problem with your medicine. When should you call for help? Call 911 anytime you think you may need emergency care. For example, call if:  ? · You passed out (lost consciousness). ? · You have symptoms of a heart attack. These may include:  ¨ Chest pain or pressure, or a strange feeling in the chest.  ¨ Sweating. ¨ Shortness of breath. ¨ Pain, pressure, or a strange feeling in the back, neck, jaw, or upper belly or in one or both shoulders or arms. ¨ Lightheadedness or sudden weakness. ¨ A fast or irregular heartbeat. After you call 911, the  may tell you to chew 1 adult-strength or 2 to 4 low-dose aspirin. Wait for an ambulance. Do not try to drive yourself. ? · You have symptoms of a stroke. These may include:  ¨ Sudden numbness, tingling, weakness, or loss of movement in your face, arm, or leg, especially on only one side of your body. ¨ Sudden vision changes. ¨ Sudden trouble speaking. ¨ Sudden confusion or trouble understanding simple statements. ¨ Sudden problems with walking or balance. ¨ A sudden, severe headache that is different from past headaches. ?Call your doctor now or seek immediate medical care if:  ? · You have heart palpitations and:  ¨ Are dizzy or lightheaded, or you feel like you may faint. ¨ Have new or increased shortness of breath. ? Watch closely for changes in your health, and be sure to contact your doctor if:  ? · You continue to have heart palpitations. Where can you learn more? Go to http://tayo-hyun.info/. Enter R508 in the search box to learn more about \"Palpitations: Care Instructions. \"  Current as of: September 21, 2016  Content Version: 11.4  © 6612-9464 [employer]. Care instructions adapted under license by [employer] (which disclaims liability or warranty for this information).  If you have questions about a medical condition or this instruction, always ask your healthcare professional. [address] any warranty or liability for your use of this information.

## 2018-05-06 NOTE — ED PROVIDER NOTES
HPI Comments: Patient with history of atrial fibrillation diabetes heart disease and hypertension. Has a history of multiple stents. He is on multiple and Lopressor for his atrial fibrillation along with eliquis. Since Wednesday has had increased episodes of atrial fibrillation. Also started having some lower abdominal pain which is slowly spread upwards with today having some chest tightness. Last episode of chest tightness was at 2100. None here. No shortness of breath nausea numbness or diaphoresis. Patient is a 78 y.o. male presenting with palpitations. The history is provided by the patient. No  was used. Palpitations    This is a new problem. The current episode started more than 2 days ago. The problem has not changed since onset. The problem occurs daily. The problem is associated with nothing. Associated symptoms include chest pain (tightness), irregular heartbeat and abdominal pain. Pertinent negatives include no diaphoresis, no fever, no malaise/fatigue, no numbness, no chest pressure, no exertional chest pressure, no nausea, no vomiting, no headaches, no back pain, no leg pain, no lower extremity edema, no dizziness, no weakness, no cough, no shortness of breath and no sputum production. Risk factors include cardiac disease, male gender and diabetes mellitus. His past medical history is significant for DM and atrial fibrillation.         Past Medical History:   Diagnosis Date    Aortic valve insufficiency 1/12/2016    Bicuspid aortic valve 1/12/2016    Cataract     Chronic kidney disease, stage III (moderate) 5/17/2016    Chronic kidney failure 1/12/2016    Chronic musculoskeletal pain     back and left leg    Coronary artery disease involving native coronary artery without angina pectoris 5/17/2016    DDD (degenerative disc disease)     Diabetes mellitus, type 2 (HCC)     Pre diabetes, no medication required    GERD (gastroesophageal reflux disease)     takes protonix and is well controlled at present    Gout     History of atrial fibrillation     treated with eliquis and plavix and multaq    History of basal cell carcinoma 2010    on hand , back and lip    History of complete eye exam     History of dental examination 08/2016    History of kidney stones     History of MI (myocardial infarction) 1993    Hyperlipidemia     Hypertension     well controlled with medication    Morbid obesity with BMI of 40.0-44.9, adult ([address] 1/12/2016    Murmur 1/12/2016    Prediabetes     Pure hypercholesterolemia 4/22/2016    Status post left hip replacement 8/1/2016       Past Surgical History:   Procedure Laterality Date    CARDIAC SURG PROCEDURE UNLIST      13 stents , last one 5/2009, total of 7 heart caths    HX ANGIOPLASTY  [id number]    multiple    HX BACK SURGERY  8/11/15    no hardware    HX CATARACT REMOVAL Bilateral     HX HEART CATHETERIZATION      HX HEENT      Dental surgery    HX MALIGNANT SKIN LESION EXCISION      basal cell     HX UROLOGICAL  1991    kidney stone extraction         Family History:   Problem Relation Age of Onset    Kidney Disease Father     Heart Disease Mother     Stroke Mother     Arthritis-osteo Brother     Heart Disease Other     Hypertension Other        Social History     Social History    Marital status:      Spouse name: N/A    Number of children: N/A    Years of education: N/A     Occupational History    Not on file. Social History Main Topics    Smoking status: Never Smoker    Smokeless tobacco: Never Used    Alcohol use No    Drug use: No    Sexual activity: Not on file     Other Topics Concern    Not on file     Social History Narrative         ALLERGIES: Potassium    Review of Systems   Constitutional: Negative for chills, diaphoresis, fever and malaise/fatigue. HENT: Negative for rhinorrhea and sore throat. Eyes: Negative for pain and redness. Respiratory: Positive for chest tightness. Negative for cough, sputum production, shortness of breath and wheezing. Cardiovascular: Positive for chest pain (tightness) and palpitations. Negative for leg swelling. Gastrointestinal: Positive for abdominal pain. Negative for diarrhea, nausea and vomiting. Genitourinary: Negative for dysuria and hematuria. Musculoskeletal: Negative for back pain, gait problem, neck pain and neck stiffness. Skin: Negative for color change and rash. Neurological: Negative for dizziness, weakness, numbness and headaches. Vitals:    05/05/18 2310 05/05/18 2345 05/06/18 0017 05/06/18 0018   BP: 118/67 130/60 138/65    Pulse: 92 78  80   Resp: 18 20  20   Temp: 98.2 °F (36.8 °C)      SpO2: 93% 93% 94% 96%   Weight: 113.9 kg (251 lb)      Height: 5' 9\" (1.753 m)               Physical Exam   Constitutional: He is oriented to person, place, and time. He appears well-developed and well-nourished. HENT:   Head: Normocephalic and atraumatic. Neck: Normal range of motion. Neck supple. Cardiovascular: Normal rate and regular rhythm. No murmur heard. Pulmonary/Chest: Effort normal and breath sounds normal. He has no wheezes. He exhibits no tenderness. Abdominal: Soft. Bowel sounds are normal. He exhibits no distension. There is tenderness (mild right lower abd. ). Musculoskeletal: Normal range of motion. He exhibits no edema. Neurological: He is alert and oriented to person, place, and time. Skin: Skin is warm and dry. Nursing note and vitals reviewed. MDM  Number of Diagnoses or Management Options  Diagnosis management comments: Two negative trops and no EKG changes. Has maintained NSR here. Will add pepcid and have pt follow up with cards.         Amount and/or Complexity of Data Reviewed  Clinical lab tests: ordered and reviewed  Tests in the radiology section of CPT®: ordered and reviewed  Tests in the medicine section of CPT®: ordered and reviewed    Patient Progress  Patient progress: stable        ED Course       Procedures      EKG: normal sinus rhythm, nonspecific ST and T waves changes. Rate 90. XR CHEST PA LAT (Final result) Result time: 05/06/18 00:13:39     Final result by Melba Schafer MD (05/06/18 00:13:39)     Impression:     IMPRESSION: No acute cardiopulmonary finding.         Narrative:     2 View Chest X-Ray 5/6/2018 12:08 AM    INDICATION: Chest pain    COMPARISON: None available at this hospital PACS    FINDINGS: Upright PA and Lateral views are submitted.  The cardiac and  mediastinal contours are normal. The lungs are normally inflated and clear, with  normal pulmonary vascularity.  There is no focal consolidation, nodule, or  pleural effusion. Grossly, the chest wall structures are intact.                Results Include:    Recent Results (from the past 24 hour(s))   CBC WITH AUTOMATED DIFF    Collection Time: 05/05/18 11:17 PM   Result Value Ref Range    WBC 7.3 4.3 - 11.1 K/uL    RBC 4.36 4.23 - 5.67 M/uL    HGB 14.5 13.6 - 17.2 g/dL    HCT 40.8 (L) 41.1 - 50.3 %    MCV 93.6 79.6 - 97.8 FL    MCH 33.3 (H) 26.1 - 32.9 PG    MCHC 35.5 (H) 31.4 - 35.0 g/dL    RDW 14.1 11.9 - 14.6 %    PLATELET 341 (L) 957 - 450 K/uL    MPV 12.6 10.8 - 14.1 FL    DF AUTOMATED      NEUTROPHILS 70 43 - 78 %    LYMPHOCYTES 17 13 - 44 %    MONOCYTES 11 4.0 - 12.0 %    EOSINOPHILS 2 0.5 - 7.8 %    BASOPHILS 0 0.0 - 2.0 %    IMMATURE GRANULOCYTES 0 0.0 - 5.0 %    ABS. NEUTROPHILS 5.1 1.7 - 8.2 K/UL    ABS. LYMPHOCYTES 1.2 0.5 - 4.6 K/UL    ABS. MONOCYTES 0.8 0.1 - 1.3 K/UL    ABS. EOSINOPHILS 0.1 0.0 - 0.8 K/UL    ABS. BASOPHILS 0.0 0.0 - 0.2 K/UL    ABS. IMM.  GRANS. 0.0 0.0 - 0.5 K/UL   METABOLIC PANEL, COMPREHENSIVE    Collection Time: 05/05/18 11:17 PM   Result Value Ref Range    Sodium 144 136 - 145 mmol/L    Potassium 3.7 3.5 - 5.1 mmol/L    Chloride 109 (H) 98 - 107 mmol/L    CO2 25 21 - 32 mmol/L    Anion gap 10 7 - 16 mmol/L    Glucose 123 (H) 65 - 100 mg/dL    BUN 30 (H) 8 - 23 MG/DL    Creatinine 1.33 0.8 - 1.5 MG/DL    GFR est AA >60 >60 ml/min/1.73m2    GFR est non-AA 56 (L) >60 ml/min/1.73m2    Calcium 9.5 8.3 - 10.4 MG/DL    Bilirubin, total 0.7 0.2 - 1.1 MG/DL    ALT (SGPT) 24 12 - 65 U/L    AST (SGOT) 30 15 - 37 U/L    Alk. phosphatase 53 50 - 136 U/L    Protein, total 7.1 6.3 - 8.2 g/dL    Albumin 3.8 3.2 - 4.6 g/dL    Globulin 3.3 2.3 - 3.5 g/dL    A-G Ratio 1.2 1.2 - 3.5     TROPONIN I    Collection Time: 05/05/18 11:17 PM   Result Value Ref Range    Troponin-I, Qt. <0.02 (L) 0.02 - 0.05 NG/ML           LIPASE (Final result) Component (Lab Inquiry)       Collection Time Result Time LPSE     05/05/18 23:17:00 05/06/18 02:04:15 307                           POC TROPONIN-I (Final result)    Abnormal Component (Lab Inquiry)       Collection Time Result Time TNIPOC     05/06/18 02:09:00 05/06/18 02:22:41 (NOTE)   Values ranging from 0.00 to 0.08 ng/ml represent a 99 percentile    ranking of individuals from a test population that demonstrates a    healthy clinical picture. The ACC recommends that the term myocardial infarction   should be used when there is evidence of myocardial necrosis   in a clinical setting consistent with myocardial ischemia. Under these conditions, any of the following meet the   diagnosis for myocardial infarction:      Detection of rise and/or fall of cardiac biomarkers   (preferably Troponin-I) with at least one value above the   99th percentile of the upper reference limit (URL) together   with evidence of myocardial ischemia with one of the   following: symptoms of ischemia, ECG changes indicative of   new ischemia, development of pathological Q waves, imaging   evidence of new loss of viable myocardium or new regional   wall motion abnormality. Sequential testing is recommended.  Cardiac Troponin-I has a    relatively long half-life and may b e present well after the CK MB has    returned to baseline.    ' data-bubble=\"IP_HOVER_BUBBLE_SERVICE\">0.01 (L)  (NOTE)   Values ranging. ..

## 2018-05-29 PROBLEM — Z79.01 ANTICOAGULANT LONG-TERM USE: Status: ACTIVE | Noted: 2018-01-01

## 2018-05-31 ENCOUNTER — APPOINTMENT (RX ONLY)
Dept: URBAN - METROPOLITAN AREA CLINIC 349 | Facility: CLINIC | Age: 78
Setting detail: DERMATOLOGY
End: 2018-05-31

## 2018-05-31 DIAGNOSIS — L57.0 ACTINIC KERATOSIS: ICD-10-CM

## 2018-05-31 DIAGNOSIS — Z85.828 PERSONAL HISTORY OF OTHER MALIGNANT NEOPLASM OF SKIN: ICD-10-CM

## 2018-05-31 DIAGNOSIS — Z87.2 PERSONAL HISTORY OF DISEASES OF THE SKIN AND SUBCUTANEOUS TISSUE: ICD-10-CM

## 2018-05-31 DIAGNOSIS — D22 MELANOCYTIC NEVI: ICD-10-CM | Status: STABLE

## 2018-05-31 PROBLEM — E78.5 HYPERLIPIDEMIA, UNSPECIFIED: Status: ACTIVE | Noted: 2018-05-31

## 2018-05-31 PROBLEM — I48.91 UNSPECIFIED ATRIAL FIBRILLATION: Status: ACTIVE | Noted: 2018-05-31

## 2018-05-31 PROBLEM — D22.71 MELANOCYTIC NEVI OF RIGHT LOWER LIMB, INCLUDING HIP: Status: ACTIVE | Noted: 2018-05-31

## 2018-05-31 PROBLEM — I10 ESSENTIAL (PRIMARY) HYPERTENSION: Status: ACTIVE | Noted: 2018-05-31

## 2018-05-31 PROBLEM — D04.72 CARCINOMA IN SITU OF SKIN OF LEFT LOWER LIMB, INCLUDING HIP: Status: ACTIVE | Noted: 2018-05-31

## 2018-05-31 PROBLEM — D22.72 MELANOCYTIC NEVI OF LEFT LOWER LIMB, INCLUDING HIP: Status: ACTIVE | Noted: 2018-05-31

## 2018-05-31 PROBLEM — D22.62 MELANOCYTIC NEVI OF LEFT UPPER LIMB, INCLUDING SHOULDER: Status: ACTIVE | Noted: 2018-05-31

## 2018-05-31 PROBLEM — D22.5 MELANOCYTIC NEVI OF TRUNK: Status: ACTIVE | Noted: 2018-05-31

## 2018-05-31 PROBLEM — D22.61 MELANOCYTIC NEVI OF RIGHT UPPER LIMB, INCLUDING SHOULDER: Status: ACTIVE | Noted: 2018-05-31

## 2018-05-31 PROCEDURE — 17000 DESTRUCT PREMALG LESION: CPT

## 2018-05-31 PROCEDURE — 88305 TISSUE EXAM BY PATHOLOGIST: CPT

## 2018-05-31 PROCEDURE — 17003 DESTRUCT PREMALG LES 2-14: CPT

## 2018-05-31 PROCEDURE — ? BIOPSY BY SHAVE METHOD

## 2018-05-31 PROCEDURE — 11100: CPT | Mod: 59

## 2018-05-31 PROCEDURE — A4550 SURGICAL TRAYS: HCPCS

## 2018-05-31 PROCEDURE — ? PATHOLOGY BILLING

## 2018-05-31 PROCEDURE — ? LIQUID NITROGEN

## 2018-05-31 PROCEDURE — ? COUNSELING

## 2018-05-31 PROCEDURE — ? MEDICAL PHOTOGRAPHY REVIEW

## 2018-05-31 PROCEDURE — 99214 OFFICE O/P EST MOD 30 MIN: CPT | Mod: 25

## 2018-05-31 ASSESSMENT — LOCATION ZONE DERM
LOCATION ZONE: FACE
LOCATION ZONE: TRUNK
LOCATION ZONE: LEG
LOCATION ZONE: HAND
LOCATION ZONE: ARM

## 2018-05-31 ASSESSMENT — LOCATION DETAILED DESCRIPTION DERM
LOCATION DETAILED: RIGHT FOREHEAD
LOCATION DETAILED: LEFT ULNAR DORSAL HAND
LOCATION DETAILED: RIGHT PROXIMAL POSTERIOR THIGH
LOCATION DETAILED: RIGHT CENTRAL ZYGOMA
LOCATION DETAILED: LEFT DISTAL POSTERIOR THIGH
LOCATION DETAILED: RIGHT LATERAL ABDOMEN
LOCATION DETAILED: RIGHT CENTRAL MALAR CHEEK
LOCATION DETAILED: RIGHT LATERAL MALAR CHEEK
LOCATION DETAILED: RIGHT SUPERIOR MEDIAL LOWER BACK
LOCATION DETAILED: LEFT INFERIOR LATERAL FOREHEAD
LOCATION DETAILED: RIGHT ANTERIOR SHOULDER
LOCATION DETAILED: RIGHT SUPERIOR LATERAL FOREHEAD
LOCATION DETAILED: LEFT DISTAL CALF
LOCATION DETAILED: LEFT CENTRAL MALAR CHEEK
LOCATION DETAILED: RIGHT MID-UPPER BACK
LOCATION DETAILED: LEFT PROXIMAL POSTERIOR UPPER ARM
LOCATION DETAILED: LEFT MID-UPPER BACK
LOCATION DETAILED: LEFT SUPERIOR CENTRAL MALAR CHEEK
LOCATION DETAILED: RIGHT CENTRAL TEMPLE
LOCATION DETAILED: LEFT SUPERIOR FOREHEAD
LOCATION DETAILED: RIGHT PROXIMAL POSTERIOR UPPER ARM
LOCATION DETAILED: SUPERIOR LUMBAR SPINE
LOCATION DETAILED: INFERIOR THORACIC SPINE

## 2018-05-31 ASSESSMENT — LOCATION SIMPLE DESCRIPTION DERM
LOCATION SIMPLE: RIGHT FOREHEAD
LOCATION SIMPLE: RIGHT UPPER BACK
LOCATION SIMPLE: RIGHT TEMPLE
LOCATION SIMPLE: RIGHT CHEEK
LOCATION SIMPLE: LEFT FOREHEAD
LOCATION SIMPLE: LEFT UPPER ARM
LOCATION SIMPLE: LEFT CHEEK
LOCATION SIMPLE: RIGHT LOWER BACK
LOCATION SIMPLE: UPPER BACK
LOCATION SIMPLE: RIGHT POSTERIOR THIGH
LOCATION SIMPLE: LEFT POSTERIOR THIGH
LOCATION SIMPLE: RIGHT UPPER ARM
LOCATION SIMPLE: LEFT CALF
LOCATION SIMPLE: LOWER BACK
LOCATION SIMPLE: RIGHT SHOULDER
LOCATION SIMPLE: RIGHT ZYGOMA
LOCATION SIMPLE: LEFT HAND
LOCATION SIMPLE: LEFT UPPER BACK
LOCATION SIMPLE: ABDOMEN

## 2018-05-31 NOTE — PROCEDURE: LIQUID NITROGEN
Consent: The patient's consent was obtained including but not limited to risks of crusting, scabbing, blistering, scarring, darker or lighter pigmentary change, recurrence, incomplete removal and infection.
Render Post-Care Instructions In Note?: no
Number Of Freeze-Thaw Cycles: 2 freeze-thaw cycles
Detail Level: Detailed
Post-Care Instructions: I reviewed with the patient in detail post-care instructions. Patient is to wear sunprotection, and avoid picking at any of the treated lesions. Pt may apply Vaseline to crusted or scabbing areas.
Duration Of Freeze Thaw-Cycle (Seconds): 3

## 2018-05-31 NOTE — PROCEDURE: BIOPSY BY SHAVE METHOD
Biopsy Type: H and E
Hemostasis: Aluminum Chloride
Accession #: [id number]
Anesthesia Volume In Cc (Will Not Render If 0): 1
Notification Instructions: Patient will be notified of biopsy results. However, patient instructed to call the office if not contacted within 2 weeks. After the procedure, the patient was oriented to person, place, and time. Patient denied feeling dizzy, queasy, and and declined further observation after initial 5 minute observation time.
Silver Nitrate Text: The wound bed was treated with silver nitrate after the biopsy was performed.
Size Of Lesion In Cm: 0
Additional Anesthesia Volume In Cc (Will Not Render If 0): 1.5
Electrodesiccation And Curettage Text: The wound bed was treated with electrodesiccation and curettage after the biopsy was performed.
Bill For Surgical Tray: yes
Electrodesiccation Text: The wound bed was treated with electrodesiccation after the biopsy was performed.
Curettage Text: The wound bed was treated with curettage after the biopsy was performed.
Post-Care Instructions: I reviewed with the patient in detail post-care instructions. Patient is to keep the biopsy site dry overnight, and then apply Vaseline  daily until healed. Patient may apply hydrogen peroxide soaks to remove any crusting. After the procedure, the patient was oriented to person, place, and time. Patient denied feeling dizzy, queasy, and and declined further observation after initial 5 minute observation time.
Detail Level: Detailed
Wound Care: Vaseline
Cryotherapy Text: The wound bed was treated with cryotherapy after the biopsy was performed.
Anesthesia Type: 1% lidocaine with 1:100,000 epinephrine and a 1:10 solution of 8.4% sodium bicarbonate
Destruction After The Procedure: No
Type Of Destruction Used: Curettage
Billing Type: Third-Party Bill
Biopsy Method: Personna blade
Consent: Written consent was obtained and risks were reviewed including but not limited to scarring, infection, bleeding, scabbing, incomplete removal, nerve damage and allergy to anesthesia.
Dressing: bandage

## 2018-06-27 ENCOUNTER — APPOINTMENT (RX ONLY)
Dept: URBAN - METROPOLITAN AREA CLINIC 349 | Facility: CLINIC | Age: 78
Setting detail: DERMATOLOGY
End: 2018-06-27

## 2018-06-27 PROBLEM — I25.10 ATHEROSCLEROTIC HEART DISEASE OF NATIVE CORONARY ARTERY WITHOUT ANGINA PECTORIS: Status: ACTIVE | Noted: 2018-06-27

## 2018-06-27 PROBLEM — D04.72 CARCINOMA IN SITU OF SKIN OF LEFT LOWER LIMB, INCLUDING HIP: Status: ACTIVE | Noted: 2018-06-27

## 2018-06-27 PROCEDURE — 17263 DSTRJ MAL LES T/A/L 2.1-3.0: CPT

## 2018-06-27 PROCEDURE — ? COUNSELING

## 2018-06-27 PROCEDURE — ? CURETTAGE AND DESTRUCTION

## 2018-06-27 NOTE — PROCEDURE: CURETTAGE AND DESTRUCTION
Post-Care Instructions: I reviewed with the patient in detail post-care instructions. Patient is to keep the area dry for 48 hours, and not to engage in any swimming until the area is healed. Should the patient develop any fevers, chills, bleeding, severe pain patient will contact the office immediately.
Add Intralesional Injection: No
Cautery Type: electrodesiccation
Anesthesia Volume In Cc: 1.5
What Was Performed First?: Curettage
Consent was obtained from the patient. The risks, benefits and alternatives to therapy were discussed in detail. Specifically, the risks of infection, scarring, bleeding, prolonged wound healing, nerve injury, incomplete removal, allergy to anesthesia and recurrence were addressed. Alternatives to ED&C, such as: surgical removal and XRT were also discussed.  Prior to the procedure, the treatment site was clearly identified and confirmed by the patient. All components of Universal Protocol/PAUSE Rule completed.
Number Of Curettages: 3
Bill As A Line Item Or As Units: Line Item
Anesthesia Type: 1% lidocaine with epinephrine
Additional Information: (Optional): The wound was cleaned, and a pressure dressing was applied.  The patient received detailed post-op instructions.
Size Of Lesion After Curettage: 2.9
Total Volume (Ccs): 1
Detail Level: Detailed

## 2018-07-17 NOTE — PERIOP NOTES
Most recent nephrology note (4/19/18) and lab results (4/12/18) received and placed on chart for anesthesia reference.

## 2018-07-17 NOTE — PERIOP NOTES
Patient verified name, , and surgery as listed in [facility]. Type 2 surgery, PAT phone assessment complete. Orders received. Labs per surgeon: None. Labs per anesthesia protocol: HGB DOS, K+ DOS, and Creatinine DOS; orders signed and held in [address] [facility]. EKG: completed 18; results to be reviewed by anesthesia. EKG dated 17 placed on chart for comparison. Charge nurse to follow up. Hal Vaz, Dr. Juan R Torres surgery scheduler, made aware patient c/o vomiting x1 week. Patient reports no fever, chest pain, SOB, diarrhea. Patient reports \"I can only keep my medication down 1/2 the time. \" Per Hal Vaz patient was in the office yesterday and discussed symptoms with Dr. Marcia Gardner, \"that's why the surgery got moved to Thursday. \"     Clearance to hold Eliquis and Plavix requested from [address] [facility] by Hal Vaz. Patient reports last dose of Eliquis 18 and last dose of Plavix 18. Patient instructed to begin taking an 81 mg ASA per anesthesia protocol. Patient verbalized understanding. Most recent cardiology note (18) placed on chart for anesthesia reference. Most recent nephrology note and lab results requested from [facility] ([phone]) to be faxed to [phone]. Patient answered medical/surgical history questions at their best of ability. All prior to admission medications documented in [facility]. Patient instructed to take the following medications the day of surgery according to anesthesia guidelines with a small sip of water: Multaq, Metoprolol, Plendil, 81 mg ASA, and isosorbide. Hold all vitamins 7 days prior to surgery and NSAIDS 5 days prior to surgery. Medications to be held: all vitamins/supplement/herbals. Patient to hold Eliquis and Plavix as instructed by Dr. Juan R Torres office. Patient instructed to begin taking an 81 mg ASA, once anticoagulants are held, per anesthesia protocol.      Patient instructed to bring Nitroglycerin to the hospital on the DOS per anesthesia protocol. Patient instructed on the following:  Arrive at [address], time of arrival to be called the day before by 1700  NPO after midnight including gum, mints, and ice chips  Responsible adult must drive patient to the hospital, stay during surgery, and patient will  need supervision 24 hours after anesthesia  Use antibacterial soap in shower the night before surgery and on the morning of surgery  Leave all valuables (money and jewelry) at home but bring insurance card and ID on       DOS  Do not wear make-up, nail polish, lotions, cologne, perfumes, powders, or oil on skin. Patient teach back successful and patient demonstrates knowledge of instruction.

## 2018-07-19 PROBLEM — K80.10 CHOLECYSTITIS WITH CHOLELITHIASIS: Status: ACTIVE | Noted: 2018-01-01

## 2018-07-19 PROBLEM — R09.02 HYPOXIA: Status: ACTIVE | Noted: 2018-01-01

## 2018-07-19 NOTE — PROGRESS NOTES
Dual skin assessment complete with Fly Arriaza RN. Patient has five lap sites with gauze and tape on abdomen. Dressing clean, dry, and intact. Sacrum/coccyx red but blanchable. An allevyn was placed.

## 2018-07-19 NOTE — PROGRESS NOTES
Surgery  Unexpected surgical findings of ascites and diffuse peritoneal implants found at surgery. Removed 10 liters of fluid. Large fluid shifts expected with third spacing to occur. Patient has borderline hypotension, is SOB and borderline hypoxia after surgery. I thought he could be watched in a regular bed, but he will need to be watched in the ICU. Will consult Екатерина Earl.    Giselle Bender MD.

## 2018-07-19 NOTE — ANESTHESIA POSTPROCEDURE EVALUATION
Post-Anesthesia Evaluation and Assessment    Patient: Myriam Valentin MRN: [id number]  SSN: [id number]    YOB: 1940  Age: 70 y.o. Sex: male       Cardiovascular Function/Vital Signs  Visit Vitals    /55    Pulse 70    Temp 36.3 °C (97.3 °F)    Resp 16    Ht 5' 9\" (1.753 m)    Wt 110.8 kg (244 lb 4 oz)    SpO2 92%    BMI 36.07 kg/m2       Patient is status post general anesthesia for Procedure(s):  CHOLECYSTECTOMY ROBOTIC ASSISTED. Nausea/Vomiting: None    Postoperative hydration reviewed and adequate. Pain:  Pain Scale 1: Numeric (0 - 10) (07/19/18 1225)  Pain Intensity 1: 8 (07/19/18 1225)   Managed    Neurological Status:   Neuro (WDL): Exceptions to WDL (07/19/18 1113)  Neuro  Neurologic State: Drowsy (07/19/18 1113)  Orientation Level: Oriented to person;Oriented to place;Oriented to situation;Oriented to time (07/19/18 1113)  Cognition: Follows commands (07/19/18 1113)  Speech: Clear (07/19/18 1113)  LUE Motor Response: Purposeful (07/19/18 1113)  LLE Motor Response: Purposeful (07/19/18 1113)  RUE Motor Response: Purposeful (07/19/18 1113)  RLE Motor Response: Purposeful (07/19/18 1113)   At baseline    Mental Status and Level of Consciousness: Arousable    Pulmonary Status:   O2 Device: Nasal cannula (07/19/18 1113)   Adequate oxygenation and airway patent    Complications related to anesthesia: None    Post-anesthesia assessment completed.  No concerns    Signed By: Cirilo Rizzo MD     July 19, 2018

## 2018-07-19 NOTE — H&P (VIEW-ONLY)
aDate: 2018      Name: Janice Eckert      MRN: [id number]       : 1940       Age: 70 y.o. Sex: male        Jonathan Lloyd MD       CC:    Chief Complaint   Patient presents with    New Patient     gallbladder       HPI:     Janice Eckert is a 70 y.o. male who presents for evaluation of gallbladder problems as a referral from Dr. Robson Hernadez. The patient had a CCK HIDA scan done which showed:  HISTORY: Cholelithiasis; Nausea, vomiting     TECHNIQUE: The patient received 6.3 mCi technetium 99m Choletec. Imaging of the  abdomen was performed. At 54 minutes, 2.22 mcg cholecystokinin analog was  administered intravenously and a gallbladder ejection fraction was measured.     COMPARISON: None available     FINDINGS: Radiopharmaceutical is distributed homogeneously throughout the liver. Gallbladder activity appears at 20 minutes. Small bowel activity appears at 70  minutes. The gallbladder ejection fraction was diminished, measuring 3%. The  patient had no significant symptomatic response to cholecystokinin analog .     IMPRESSION  IMPRESSION:      1. No cystic duct obstruction or complete distal biliary obstruction.     2. Diminished gallbladder ejection fracture, measuring 3%. This finding may be  present with biliary dyskinesia in the appropriate clinical setting.         The patient has had \"years\" of RUQ pain which is been getting worse. He has constant nausea and is very uncomfortable. He is on Plavix and Eliquis, which he stopped on his own as he wants to have surgery as soon as possible. He is having RUQ pain, nausea, occasional vomiting, pain referred to his back and diarrhea. He has known cholelithiasis based on previous studies.     PMH:    Past Medical History:   Diagnosis Date    Aortic valve insufficiency 2016    Bicuspid aortic valve 2016    CAD (coronary artery disease)     Cataract     Chronic kidney disease, stage III (moderate) 2016    Chronic kidney failure 1/12/2016    Chronic musculoskeletal pain     back and left leg    Coronary artery disease involving native coronary artery without angina pectoris 5/17/2016    DDD (degenerative disc disease)     Diabetes mellitus, type 2 ([facility] [address]     Pre diabetes, no medication required    GERD (gastroesophageal reflux disease)     takes protonix and is well controlled at present    Gout     History of atrial fibrillation     treated with eliquis and plavix and multaq    History of basal cell carcinoma 2010    on hand , back and lip    History of complete eye exam     History of dental examination 08/2016    History of kidney stones     History of MI (myocardial infarction) 1993    Hyperlipidemia     Hypertension     well controlled with medication    Morbid obesity with BMI of 40.0-44.9, adult ([facility] [address] 1/12/2016    Murmur 1/12/2016    Prediabetes     Pure hypercholesterolemia 4/22/2016    Status post left hip replacement 8/1/2016       PSH:    Past Surgical History:   Procedure Laterality Date    CARDIAC SURG PROCEDURE UNLIST      13 stents , last one 5/2009, total of 7 heart caths    HX ANGIOPLASTY  [id number]    multiple    HX BACK SURGERY  8/11/15    no hardware    HX CATARACT REMOVAL Bilateral     HX HEART CATHETERIZATION      HX HEENT      Dental surgery    HX MALIGNANT SKIN LESION EXCISION      basal cell     HX ORTHOPAEDIC Left     hip replacement    HX UROLOGICAL  1991    kidney stone extraction       MEDS:    Current Outpatient Prescriptions   Medication Sig    simethicone (MYLICON) 80 mg chewable tablet Take 1 Tab by mouth every six (6) hours as needed for Flatulence.  dronedarone (MULTAQ) tab tablet Take 1 Tab by mouth two (2) times daily (with meals).  apixaban (ELIQUIS) 5 mg tablet Take 1 Tab by mouth two (2) times a day.  isosorbide mononitrate ER (IMDUR) 60 mg CR tablet Take 1 Tab by mouth every morning.  clopidogrel (PLAVIX) 75 mg tab Take 1 Tab by mouth daily.     famotidine (PEPCID) 40 mg tablet Take 1 Tab by mouth nightly.  fenofibrate (LOFIBRA) 160 mg tablet Take 1 Tab by mouth every morning.  lisinopril-hydroCHLOROthiazide (PRINZIDE, ZESTORETIC) 20-25 mg per tablet Take 1 Tab by mouth every morning.  rosuvastatin (CRESTOR) 20 mg tablet Take 1 Tab by mouth nightly.  pantoprazole (PROTONIX) 40 mg tablet Take 1 Tab by mouth nightly.  tamsulosin (FLOMAX) 0.4 mg capsule Take 1 Cap by mouth daily.  felodipine (PLENDIL SR) 10 mg 24 hr tablet Take 1 Tab by mouth daily.  potassium citrate (UROCIT-K 10) 10 mEq (1,080 mg) TbER Take  by mouth two (2) times a day.  metoprolol tartrate (LOPRESSOR) 50 mg tablet Take 1 Tab by mouth daily. (Patient taking differently: Take 50 mg by mouth daily. 1/2 tab twice a day)    TRAVATAN Z 0.004 % ophthalmic solution Administer 1 Drop to both eyes nightly. Once daily at bedtime    Cholecalciferol, Vitamin D3, (VITAMIN D3) 1,000 unit cap Take 2,000 Units by mouth daily.  nitroglycerin (NITROSTAT) 0.4 mg SL tablet 0.4 mg by SubLINGual route every five (5) minutes as needed. Take / use AM day of surgery  per anesthesia protocols if needed.  allopurinol (ZYLOPRIM) 300 mg tablet Take 300 mg by mouth nightly. Indications: GOUT    finasteride (PROSCAR) 5 mg tablet Take 5 mg by mouth nightly. No current facility-administered medications for this visit. ALLERGIES:      Allergies   Allergen Reactions    Potassium Nausea Only       SH:    Social History   Substance Use Topics    Smoking status: Never Smoker    Smokeless tobacco: Never Used    Alcohol use No       FH:    Family History   Problem Relation Age of Onset    Kidney Disease Father     Heart Disease Mother     Stroke Mother     Arthritis-osteo Brother     Heart Disease Other     Hypertension Other        ROS: The patient has no difficulty with chest pain or shortness of breath. No fever or chills.   Comprehensive 13 point review of systems was otherwise unremarkable except as noted above. Physical Exam:     Visit Vitals    /89    Pulse 69    Ht 5' 9\" (1.753 m)    Wt 244 lb (110.7 kg)    BMI 36.03 kg/m2       General: Alert, oriented, obese white male in no acute distress. Eyes: Sclera are clear. Conjunctiva and lids within normal limits. No icterus. Ears and Nose: no gross deformities to visual inspection, gross hearing intact  Neck: Supple, trachea midline, no appreciable thyromegaly  Resp: Breathing is  non-labored. Lungs clear to auscultation without wheezing or rhonchi   CV: RRR. No murmurs, rubs or gallops appreciated. Abd: soft, RUQ tenderness, active BS'S. Psych:  Mood and affect appropriate. Short-term memory and understanding intact      Assessment/Plan:  Alicia Muir is a 70 y.o. male who has signs and symptoms consistent with biliary dyskinesia having an EF of 3%. 1. Robotic assisted, laparoscopic, possible open, cholecystectomy. I went through the risks of bleeding, infection and anesthesia. I went through other risks of injury to the liver, biliary tree structures, stomach, small bowel, large bowel , pancreas and the potential need to convert to an open procedure.     Ananda Alvares MD      FACS   7/16/2018  2:41 PM

## 2018-07-19 NOTE — PERIOP NOTES
TRANSFER - OUT REPORT:    Verbal report given to [facility] RN on Julius Wong  being transferred to [facility] for routine progression of care       Report consisted of patients Situation, Background, Assessment and   Recommendations(SBAR). Information from the following report(s) SBAR was reviewed with the receiving nurse. Lines:   Peripheral IV 07/19/18 Right Forearm (Active)   Site Assessment Clean, dry, & intact 7/19/2018 10:13 AM   Phlebitis Assessment 0 7/19/2018 10:13 AM   Infiltration Assessment 0 7/19/2018 10:13 AM   Dressing Status Clean, dry, & intact 7/19/2018 10:13 AM   Dressing Type Tape;Transparent 7/19/2018 10:13 AM   Hub Color/Line Status Green 7/19/2018 10:13 AM        Opportunity for questions and clarification was provided.       Patient transported with:   O2 @ 4 liters

## 2018-07-19 NOTE — OP NOTES
[address] [place] [address] REPORT    Sarabjit Cochran  MR#: [id number]  : 1940  ACCOUNT #: [de-identified]   DATE OF SERVICE: 2018    PREOPERATIVE DIAGNOSIS:  Cholelithiasis as well as biliary dyskinesia having an ejection fraction of only 3% on CCK HIDA scan. POSTOPERATIVE DIAGNOSIS:  Cholelithiasis as well as biliary dyskinesia having an ejection fraction of only 3% on CCK HIDA scan as well as the unexpected findings of ascites with over 10 liters of fluid removed and also diffuse peritoneal implants consistent with metastatic cancer. PROCEDURE PERFORMED:  Robotic-assisted cholecystectomy plus multiple peritoneal biopsies and 2 Shamir-Cut liver biopsies. Also, he had hepatomegaly and fibrosis of his liver. SURGEON:  Alecia Xiao MD    ANESTHESIA:  General endotracheal anesthesia plus 30 mL 0.5% Marcaine used for local anesthesia at the end of procedure. ESTIMATED BLOOD LOSS:  25 mL. We also removed 10 liters of ascites, which was unexpected. IMPLANTS:  None. COMPLICATIONS:  None. ASSISTANTS:  None. SPECIMENS REMOVED:  1. Gallbladder  2. Liver biopsies (right lobe of liver)    HISTORY:  This is a 56-year-old male who was referred by Dr. Ness Goldberg for what appeared to be simple gallbladder problems. He had previous ultrasounds in the past where he was known to have gallstones. He chose not to have anything done about his gallbladder removal.  Over the last several months, he has had worsening right upper quadrant pain. Dr. Ness Goldberg ordered a CCK HIDA scan which showed an ejection fraction of only 3%. I saw the patient in the office scheduled him for a robotic-assisted possible laparoscopic cholecystectomy today. Patient came in to see me on Monday the  and he had already stopped his Plavix and Eliquis in anticipation of having surgery as soon as possible due to the worsening pain.   He was cleared by Cardiology and was sent to our office where we scheduled him for surgery for 07/19. I saw the patient in the preoperative area, went through the risks of bleeding, infection, anesthesia, injury to the liver, biliary tree structure, small bowel, large bowel, stomach, pancreas, potential need to convert to an open procedure. The patient was agreeable and signed a consent form. The patient was seen in preoperatively with his wife and taken to room #8 at [address] [place] [address] where a robotic-assisted cholecystectomy was planned. The patient was placed on the operating room table in supine position. General endotracheal anesthesia was done by Dr. Aurora Scanlon. The patient had sequential compression devices placed and used throughout the procedure. Two grams of Ancef were given as prophylactic antibiotic coverage. The abdomen was prepped and draped in the usual sterile manner. I made an incision superior and to the left of the umbilicus and through this an optical trocar with a 10 mm 0 degree scope was inserted through the appropriate layers of the anterior abdominal wall. Once in the abdominal cavity, the abdomen was insufflated to 15 mmHg. What is interesting is while he had the catheter in waiting to hook up the suction the trocar filled with ascitic fluid. Once we were able to hook up the insufflation the patient was placed in the reverse Trendelenburg position with the right side up, the left side down. Upon visualization with a 10 mm 30 degree scope he had massive amounts of fluid in his abdomen. We placed an 8 mm trocar in the midclavicular line and a 5 mm trocar in the right anterior axillary line as well as an 8 mm trocar in the left midclavicular line with use with the robot. We suctioned out 10 liters of ascitic fluid. I sent some of this for cytology. I also noticed once we suctioned out all of the ascites that there were peritoneal implants everywhere.  Later during the case, we would biopsy the liver and remove some of the peritoneal implants for biopsy as well. Once the ascites had been drained, we grabbed the gallbladder. The gallbladder appeared to have stones, but did not appear to be terribly infected at the present time. We then grasped it with a ratcheted grasper, which was used by the assistant to tent it up throughout the remainder of the procedure. The robot was brought onto the field. It was docked in the usual fashion. Once it was docked, we brought a Cadiere through 1 port and a hook through the other. The instruments were brought in under direct vision. The 10 mm 30 degree down scope was used. I went to the console and began the procedure. I began the area of dissection in the area of the Calot triangle. I was able to clear the tissue off the cystic duct and cystic artery. I saw the critical view of safety and I clipped both structures twice on the stay side, once on the specimen side. I divided this with the hook on the cutting current. The gallbladder was removed easily from the gallbladder fossa with the use of the hook and the electrocautery. Once it had been disconnected we checked the gallbladder fossa. There was no bleeding or bile leak. There was no bleeding from cystic artery stump. No bile leak from the cystic duct stump. The robot was undocked. I returned to the table after scrubbing back in. We placed the gallbladder in an Endopouch which was brought through the 12 mm Optiview trocar and removed the gallbladder from the peritoneal cavity and was sent to pathology for evaluation. I then made an incision in the right upper quadrant and did 2 Shamir-Cut liver biopsies of the right lobe of the liver. These were sent as liver biopsies. We then used the laparoscopic soy and a grasper to excise 4 of the peritoneal implants which were sent as peritoneal implants. The areas where we did the peritoneal implants were cauterized as was the liver where I had done the biopsies.  When no further bleeding was noted. All the trocars were removed under direct vision without bleeding from the trocar sites. The Optiview trocar was removed. The port sites were closed as was the liver biopsy site with surgical staples. I injected 30 mL of 0.5% Marcaine in divided doses at the incision sites. The patient had some transient hypotension. He was extubated but was short of breath after surgery, probably due to massive fluid shifts. He will be   admitted, sent to the ICU and have [facility] see the patient. I went out and spoke to the patient's wife and will call Dr. Sujata Faulkner his primary care physician to let him know of the results.       MD VIN GutiérrezA / FIAN  D: 07/19/2018 10:01     T: 07/19/2018 10:51  JOB #: [id number]

## 2018-07-19 NOTE — PROGRESS NOTES
TRANSFER - IN REPORT:    Verbal report received from [facility] (name) on Anna Pierce  being received from [facility]) for routine post - op      Report consisted of patients Situation, Background, Assessment and   Recommendations(SBAR). Information from the following report(s) SBAR, Kardex, OR Summary, Procedure Summary, Intake/Output, MAR, Accordion, Recent Results, Med Rec Status and Cardiac Rhythm NSR was reviewed with the receiving nurse. Opportunity for questions and clarification was provided. Assessment completed upon patients arrival to unit and care assumed.

## 2018-07-19 NOTE — IP AVS SNAPSHOT
Blain Kehr 
 
 
 [address] [place] [address] 
[phone] Patient: Isabel Warner MRN: [id number] HFU:1/1/4189 A check frank indicates which time of day the medication should be taken. My Medications START taking these medications Instructions Each Dose to Equal  
 Morning Noon Evening Bedtime  
 ondansetron hcl 8 mg tablet Commonly known as:  Aimee Dicvasiliy Your last dose was: Your next dose is: Take 1 Tab by mouth every eight (8) hours as needed for Nausea. 8 mg  
    
   
   
   
  
 oxyCODONE-acetaminophen 5-325 mg per tablet Commonly known as:  PERCOCET Your last dose was: Your next dose is: Take 1 Tab by mouth every four (4) hours as needed (Mild pain. ). Max Daily Amount: 6 Tabs. 1 Tab  
    
   
   
   
  
 polyethylene glycol 17 gram packet Commonly known as:  Rupali Verdin Your last dose was: Your next dose is: Take 1 Packet by mouth daily for 14 days. Hold for loose stools 17 g CHANGE how you take these medications Instructions Each Dose to Equal  
 Morning Noon Evening Bedtime  
 apixaban 5 mg tablet Commonly known as:  Ladan Alisson What changed:  additional instructions Your last dose was: Your next dose is: Take 1 Tab by mouth two (2) times a day. 5 mg  
    
   
   
   
  
 clopidogrel 75 mg Tab Commonly known as:  PLAVIX What changed:  additional instructions Your last dose was: Your next dose is: Take 1 Tab by mouth daily. 75 mg  
    
   
   
   
  
 metoprolol tartrate 50 mg tablet Commonly known as:  LOPRESSOR What changed:  additional instructions Your last dose was: Your next dose is: Take 1 Tab by mouth daily. 50 mg  
    
   
   
   
  
 tamsulosin 0.4 mg capsule Commonly known as:  FLOMAX What changed:  when to take this Your last dose was: Your next dose is: Take 1 Cap by mouth daily. 0.4 mg  
    
   
   
   
  
  
CONTINUE taking these medications Instructions Each Dose to Equal  
 Morning Noon Evening Bedtime  
 allopurinol 300 mg tablet Commonly known as:  Ollen Epley Your last dose was: Your next dose is: Take 300 mg by mouth nightly. Indications: GOUT  
 300 mg  
    
   
   
   
  
 aspirin delayed-release 81 mg tablet Your last dose was: Your next dose is: Take 81 mg by mouth daily. Patient instructed to take 81 mg  
    
   
   
   
  
 dronedarone Tab tablet Commonly known as:  Rohini Smith Your last dose was: Your next dose is: Take 1 Tab by mouth two (2) times daily (with meals). 400 mg  
    
   
   
   
  
 famotidine 40 mg tablet Commonly known as:  PEPCID Your last dose was: Your next dose is: Take 1 Tab by mouth nightly. 40 mg  
    
   
   
   
  
 felodipine 10 mg 24 hr tablet Commonly known as:  PLENDIL SR Your last dose was: Your next dose is: Take 1 Tab by mouth daily. 10 mg  
    
   
   
   
  
 fenofibrate 160 mg tablet Commonly known as:  LOFIBRA Your last dose was: Your next dose is: Take 1 Tab by mouth every morning. 160 mg  
    
   
   
   
  
 finasteride 5 mg tablet Commonly known as:  PROSCAR Your last dose was: Your next dose is: Take 5 mg by mouth nightly. 5 mg  
    
   
   
   
  
 isosorbide mononitrate ER 60 mg CR tablet Commonly known as:  IMDUR Your last dose was: Your next dose is: Take 1 Tab by mouth every morning. 60 mg  
    
   
   
   
  
 lisinopril-hydroCHLOROthiazide 20-25 mg per tablet Commonly known as:  Genie Peralta Your last dose was: Your next dose is: Take 1 Tab by mouth every morning. 1 Tab NITROSTAT 0.4 mg SL tablet Generic drug:  nitroglycerin Your last dose was: Your next dose is: 0.4 mg by SubLINGual route every five (5) minutes as needed. Take / use AM day of surgery  per anesthesia protocols if needed. 0.4 mg  
    
   
   
   
  
 pantoprazole 40 mg tablet Commonly known as:  PROTONIX Your last dose was: Your next dose is: Take 1 Tab by mouth nightly. 40 mg  
    
   
   
   
  
 rosuvastatin 20 mg tablet Commonly known as:  CRESTOR Your last dose was: Your next dose is: Take 1 Tab by mouth nightly. 20 mg  
    
   
   
   
  
 simethicone 80 mg chewable tablet Commonly known as:  Brynda Emperor Your last dose was: Your next dose is: Take 1 Tab by mouth every six (6) hours as needed for Flatulence. 80 mg  
    
   
   
   
  
 TRAVATAN Z 0.004 % ophthalmic solution Generic drug:  travoprost  
   
Your last dose was: Your next dose is:    
   
   
 Administer 1 Drop to both eyes nightly. Once daily at bedtime 1 Drop UROCIT-K 10 10 mEq (1,080 mg) Luke Moya Generic drug:  potassium citrate Your last dose was: Your next dose is: Take  by mouth two (2) times a day. VITAMIN D3 1,000 unit Cap Generic drug:  cholecalciferol Your last dose was: Your next dose is: Take 2,000 Units by mouth daily. 2000 Units Where to Get Your Medications These medications were sent to [address] [place], [place] [address] [facility], [address] [place] [address] [address] [address] Phone:  [phone]  
  ondansetron hcl 8 mg tablet  
 polyethylene glycol 17 gram packet Information on where to get these meds will be given to you by the nurse or doctor. ! Ask your nurse or doctor about these medications  
  oxyCODONE-acetaminophen 5-325 mg per tablet

## 2018-07-19 NOTE — PROGRESS NOTES
Continued support given to patient and wife. Patient talked with me at length regarding diagnosis and feelings surrounding news. Prayer and support given. Kaylene Fam M.Div.

## 2018-07-19 NOTE — INTERVAL H&P NOTE
H&P Update:  Michael Townsend was seen and examined. History and physical has been reviewed. The patient has been examined.  There have been no significant clinical changes since the completion of the originally dated History and Physical.    Signed By: Tasia Arredondo MD     July 19, 2018 6:57 AM

## 2018-07-19 NOTE — IP AVS SNAPSHOT
Jennyfer Ramirez 
 
 
 [address] 
[phone] Patient: Jonathan Ya MRN: [id number] SCOUT:6[birth year]7/4446 About your hospitalization You were admitted on:  July 19, 2018 You last received care in the:  [address] [place] [address] You were discharged on:  July 24, 2018 Why you were hospitalized Your primary diagnosis was:  Cholecystitis With Cholelithiasis Your diagnoses also included:  Hypoxia, S/P Laparoscopic Cholecystectomy, Ascites, Peritoneal Carcinomatosis (Hcc), Thrombocytopenia (Hcc), Respiratory Failure, Post-Operative (Hcc), Atelectasis, Acute Renal Failure Superimposed On Chronic Kidney Disease (Hcc) Follow-up Information Follow up With Details Comments Contact Info Radha Sahu MD   [address] [facility] [address] 
[phone] Your Scheduled Appointments Tuesday July 31, 2018 10:00 AM EDT Global Post Op with YULIANA Oconnor  
[facility] ([address] [place] [id number] [phone] Thursday August 02, 2018  1:40 PM EDT Extended Office Visit with Radha Sahu MD  
[address] ([facility]) [address] [facility] [address]  
[phone] Discharge Orders None A check frank indicates which time of day the medication should be taken. My Medications START taking these medications Instructions Each Dose to Equal  
 Morning Noon Evening Bedtime  
 ondansetron hcl 8 mg tablet Commonly known as:  Fidelia Velasquez Your last dose was: Your next dose is: Take 1 Tab by mouth every eight (8) hours as needed for Nausea. 8 mg  
    
   
   
   
  
 oxyCODONE-acetaminophen 5-325 mg per tablet Commonly known as:  PERCOCET Your last dose was: Your next dose is: Take 1 Tab by mouth every four (4) hours as needed (Mild pain. ). Max Daily Amount: 6 Tabs. 1 Tab  
    
   
   
   
  
 polyethylene glycol 17 gram packet Commonly known as:  Aurora Mckeon Your last dose was: Your next dose is: Take 1 Packet by mouth daily for 14 days. Hold for loose stools 17 g CHANGE how you take these medications Instructions Each Dose to Equal  
 Morning Noon Evening Bedtime  
 apixaban 5 mg tablet Commonly known as:  Princella Burns What changed:  additional instructions Your last dose was: Your next dose is: Take 1 Tab by mouth two (2) times a day. 5 mg  
    
   
   
   
  
 clopidogrel 75 mg Tab Commonly known as:  PLAVIX What changed:  additional instructions Your last dose was: Your next dose is: Take 1 Tab by mouth daily. 75 mg  
    
   
   
   
  
 metoprolol tartrate 50 mg tablet Commonly known as:  LOPRESSOR What changed:  additional instructions Your last dose was: Your next dose is: Take 1 Tab by mouth daily. 50 mg  
    
   
   
   
  
 tamsulosin 0.4 mg capsule Commonly known as:  FLOMAX What changed:  when to take this Your last dose was: Your next dose is: Take 1 Cap by mouth daily. 0.4 mg  
    
   
   
   
  
  
CONTINUE taking these medications Instructions Each Dose to Equal  
 Morning Noon Evening Bedtime  
 allopurinol 300 mg tablet Commonly known as:  Marcell Melgar Your last dose was: Your next dose is: Take 300 mg by mouth nightly. Indications: GOUT  
 300 mg  
    
   
   
   
  
 aspirin delayed-release 81 mg tablet Your last dose was: Your next dose is: Take 81 mg by mouth daily. Patient instructed to take 81 mg  
    
   
   
   
  
 dronedarone Tab tablet Commonly known as:  Valdickson Laser Your last dose was: Your next dose is: Take 1 Tab by mouth two (2) times daily (with meals). 400 mg  
    
   
   
   
  
 famotidine 40 mg tablet Commonly known as:  PEPCID Your last dose was: Your next dose is: Take 1 Tab by mouth nightly. 40 mg  
    
   
   
   
  
 felodipine 10 mg 24 hr tablet Commonly known as:  PLENDIL SR Your last dose was: Your next dose is: Take 1 Tab by mouth daily. 10 mg  
    
   
   
   
  
 fenofibrate 160 mg tablet Commonly known as:  LOFIBRA Your last dose was: Your next dose is: Take 1 Tab by mouth every morning. 160 mg  
    
   
   
   
  
 finasteride 5 mg tablet Commonly known as:  PROSCAR Your last dose was: Your next dose is: Take 5 mg by mouth nightly. 5 mg  
    
   
   
   
  
 isosorbide mononitrate ER 60 mg CR tablet Commonly known as:  IMDUR Your last dose was: Your next dose is: Take 1 Tab by mouth every morning. 60 mg  
    
   
   
   
  
 lisinopril-hydroCHLOROthiazide 20-25 mg per tablet Commonly known as:  Climmie Harika Your last dose was: Your next dose is: Take 1 Tab by mouth every morning. 1 Tab NITROSTAT 0.4 mg SL tablet Generic drug:  nitroglycerin Your last dose was: Your next dose is: 0.4 mg by SubLINGual route every five (5) minutes as needed. Take / use AM day of surgery  per anesthesia protocols if needed. 0.4 mg  
    
   
   
   
  
 pantoprazole 40 mg tablet Commonly known as:  PROTONIX Your last dose was: Your next dose is: Take 1 Tab by mouth nightly. 40 mg  
    
   
   
   
  
 rosuvastatin 20 mg tablet Commonly known as:  CRESTOR Your last dose was: Your next dose is: Take 1 Tab by mouth nightly.   
 20 mg  
    
   
   
   
  
 simethicone 80 mg chewable tablet Commonly known as:  Gurmeet Hdez Your last dose was: Your next dose is: Take 1 Tab by mouth every six (6) hours as needed for Flatulence. 80 mg  
    
   
   
   
  
 TRAVATAN Z 0.004 % ophthalmic solution Generic drug:  travoprost  
   
Your last dose was: Your next dose is:    
   
   
 Administer 1 Drop to both eyes nightly. Once daily at bedtime 1 Drop UROCIT-K 10 10 mEq (1,080 mg) Basia Gilman Generic drug:  potassium citrate Your last dose was: Your next dose is: Take  by mouth two (2) times a day. VITAMIN D3 1,000 unit Cap Generic drug:  cholecalciferol Your last dose was: Your next dose is: Take 2,000 Units by mouth daily. 2000 Units Where to Get Your Medications These medications were sent to [address] [place], [place] [address] [place] [address] Phone:  [phone]  
  ondansetron hcl 8 mg tablet  
 polyethylene glycol 17 gram packet Information on where to get these meds will be given to you by the nurse or doctor. ! Ask your nurse or doctor about these medications  
  oxyCODONE-acetaminophen 5-325 mg per tablet Opioid Education Prescription Opioids: What You Need to Know: 
 
Prescription opioids can be used to help relieve moderate-to-severe pain and are often prescribed following a surgery or injury, or for certain health conditions. These medications can be an important part of treatment but also come with serious risks. Opioids are strong pain medicines. Examples include hydrocodone, oxycodone, fentanyl, and morphine. Heroin is an example of an illegal opioid. It is important to work with your health care provider to make sure you are getting the safest, most effective care. WHAT ARE THE RISKS AND SIDE EFFECTS OF OPIOID USE? Prescription opioids carry serious risks of addiction and overdose, especially with prolonged use. An opioid overdose, often marked by slow breathing, can cause sudden death. The use of prescription opioids can have a number of side effects as well, even when taken as directed. · Tolerance-meaning you might need to take more of a medication for the same pain relief · Physical dependence-meaning you have symptoms of withdrawal when the medication is stopped. Withdrawal symptoms can include nausea, sweating, chills, diarrhea, stomach cramps, and muscle aches. Withdrawal can last up to several weeks, depending on which drug you took and how long you took it. · Increased sensitivity to pain · Constipation · Nausea, vomiting, and dry mouth · Sleepiness and dizziness · Confusion · Depression · Low levels of testosterone that can result in lower sex drive, energy, and strength · Itching and sweating RISKS ARE GREATER WITH:      
· History of drug misuse, substance use disorder, or overdose · Mental health conditions (such as depression or anxiety) · Sleep apnea · Older age (72 years or older) · Pregnancy Avoid alcohol while taking prescription opioids. Also, unless specifically advised by your health care provider, medications to avoid include: · Benzodiazepines (such as Xanax or Valium) · Muscle relaxants (such as Soma or Flexeril) · Hypnotics (such as Ambien or Lunesta) · Other prescription opioids KNOW YOUR OPTIONS Talk to your health care provider about ways to manage your pain that don't involve prescription opioids. Some of these options may actually work better and have fewer risks and side effects. Options may include: 
· Pain relievers such as acetaminophen, ibuprofen, and naproxen · Some medications that are also used for depression or seizures · Physical therapy and exercise · Counseling to help patients learn how to cope better with triggers of pain and stress. · Application of heat or cold compress · Massage therapy · Relaxation techniques Be Informed Make sure you know the name of your medication, how much and how often to take it, and its potential risks & side effects. IF YOU ARE PRESCRIBED OPIOIDS FOR PAIN: 
· Never take opioids in greater amounts or more often than prescribed. Remember the goal is not to be pain-free but to manage your pain at a tolerable level. · Follow up with your primary care provider to: · Work together to create a plan on how to manage your pain. · Talk about ways to help manage your pain that don't involve prescription opioids. · Talk about any and all concerns and side effects. · Help prevent misuse and abuse. · Never sell or share prescription opioids · Help prevent misuse and abuse. · Store prescription opioids in a secure place and out of reach of others (this may include visitors, children, friends, and family). · Safely dispose of unused/unwanted prescription opioids: Find your community drug take-back program or your pharmacy mail-back program, or flush them down the toilet, following guidance from the Food and Drug Administration (www.fda.gov/Drugs/ResourcesForYou). · Visit www.cdc.gov/drugoverdose to learn about the risks of opioid abuse and overdose. · If you believe you may be struggling with addiction, tell your health care provider and ask for guidance or call [address] [place] [address] at [phone]. Discharge Instructions 1. Diet as tolerated except for a  low fat diet after laparoscopic cholecystectomy. 2. Showering is allowed, but no tub baths, hot tubs or swimming. 3. Drainage is common from the wounds. Change the dressings as needed.  Call our office if the wounds become reddened, tender, feel warm to the touch or pus starts to drain from the wound. 4. Take prescribed pain medication as directed, usually Percocet, Norco, Ultram or Dilaudid. Take over the counter medication for minor pain. 5. Ice may be applied intermittently to the surgical site or sites. 6. Call or office, [phone], if problems arise. 7. Follow up in the office at the assigned time. DISCHARGE SUMMARY from Nurse PATIENT INSTRUCTIONS: 
 
After general anesthesia or intravenous sedation, for 24 hours or while taking prescription Narcotics: · Limit your activities · Do not drive and operate hazardous machinery · Do not make important personal or business decisions · Do  not drink alcoholic beverages · If you have not urinated within 8 hours after discharge, please contact your surgeon on call. Report the following to your surgeon: 
· Excessive pain, swelling, redness or odor of or around the surgical area · Temperature over 100.5 · Nausea and vomiting lasting longer than 4 hours or if unable to take medications · Any signs of decreased circulation or nerve impairment to extremity: change in color, persistent  numbness, tingling, coldness or increase pain · Any questions What to do at Home: 
Recommended activity: Activity as tolerated and No lifting, Driving, or Strenuous exercise until told otherwise by MD. 
 
If you experience any of the following symptoms temperature greater than 101, pain not relieved by any medicaiton, please follow up with MD. 
 
*  Please give a list of your current medications to your Primary Care Provider. *  Please update this list whenever your medications are discontinued, doses are 
    changed, or new medications (including over-the-counter products) are added. *  Please carry medication information at all times in case of emergency situations. These are general instructions for a healthy lifestyle: No smoking/ No tobacco products/ Avoid exposure to second hand smoke Surgeon General's Warning:  Quitting smoking now greatly reduces serious risk to your health. Obesity, smoking, and sedentary lifestyle greatly increases your risk for illness A healthy diet, regular physical exercise & weight monitoring are important for maintaining a healthy lifestyle You may be retaining fluid if you have a history of heart failure or if you experience any of the following symptoms:  Weight gain of 3 pounds or more overnight or 5 pounds in a week, increased swelling in our hands or feet or shortness of breath while lying flat in bed. Please call your doctor as soon as you notice any of these symptoms; do not wait until your next office visit. Recognize signs and symptoms of STROKE: 
 
F-face looks uneven A-arms unable to move or move unevenly S-speech slurred or non-existent T-time-call 911 as soon as signs and symptoms begin-DO NOT go Back to bed or wait to see if you get better-TIME IS BRAIN. Warning Signs of HEART ATTACK Call 911 if you have these symptoms: 
? Chest discomfort. Most heart attacks involve discomfort in the center of the chest that lasts more than a few minutes, or that goes away and comes back. It can feel like uncomfortable pressure, squeezing, fullness, or pain. ? Discomfort in other areas of the upper body. Symptoms can include pain or discomfort in one or both arms, the back, neck, jaw, or stomach. ? Shortness of breath with or without chest discomfort. ? Other signs may include breaking out in a cold sweat, nausea, or lightheadedness. Don't wait more than five minutes to call [address]! Fast action can save your life. Calling 911 is almost always the fastest way to get lifesaving treatment. Emergency Medical Services staff can begin treatment when they arrive  up to an hour sooner than if someone gets to the hospital by car. The discharge information has been reviewed with the patient.   The patient verbalized understanding. Discharge medications reviewed with the patient and appropriate educational materials and side effects teaching were provided. ___________________________________________________________________________________________________________________________________ 8. Resume all medications as taken per surgery, unless specifically instructed not to take certain ones. 9. No lifting more than 25 pounds until told otherwise. 10. Driving is allowed 3 days after surgery as long as you feel comfortable enough to drive and have not taken any prescription pain medication prior to driving. 11. Resume Plavix and Eliquis on 7/23/18. ACO Transitions of Care Introducing [facility] [address] [place] [address] offers a voluntary care coordination program to provide high quality service and care to [facility] fee-for-service beneficiaries. Laurel Tavera was designed to help you enhance your health and well-being through the following services: ? Transitions of Care  support for individuals who are transitioning from one care setting to another (example: Hospital to home). ? Chronic and Complex Care Coordination  support for individuals and caregivers of those with serious or chronic illnesses or with more than one chronic (ongoing) condition and those who take a number of different medications. If you meet specific medical criteria, a [address] [place] [address] Rd may call you directly to coordinate your care with your primary care physician and your other care providers. For questions about the [facility] programs, please, contact your physicians office. For general questions or additional information about Accountable Care Organizations: 
Please visit www.medicare.gov/acos. html or call 1-800-MEDICARE (7-305.819.4781) TTY users should call 5-172.388.6375. MyChart Announcement We are excited to announce that we are making your provider's discharge notes available to you in Evocalize. You will see these notes when they are completed and signed by the physician that discharged you from your recent hospital stay. If you have any questions or concerns about any information you see in Evocalize, please call the Health Information Department where you were seen or reach out to your Primary Care Provider for more information about your plan of care. Introducing Tye! Dear Lionel Valdivia: Thank you for requesting a Evocalize account. Our records indicate that you already have an active Evocalize account. You can access your account anytime at [email or web address] Did you know that you can access your hospital and ER discharge instructions at any time in Evocalize? You can also review all of your test results from your hospital stay or ER visit. Additional Information If you have questions, please visit the Frequently Asked Questions section of the Evocalize website at [email or web address] Remember, Evocalize is NOT to be used for urgent needs. For medical emergencies, dial 911. Now available from your iPhone and Android! Introducing Primo Gagnon As a [employer] patient, I wanted to make you aware of our electronic visit tool called Primo Gagnon. [employer] 24/7 allows you to connect within minutes with a medical provider 24 hours a day, seven days a week via a mobile device or tablet or logging into a secure website from your computer. You can access Primo Gagnon from anywhere in the [place].  
 
A virtual visit might be right for you when you have a simple condition and feel like you just dont want to get out of bed, or cant get away from work for an appointment, when your regular [employer] provider is not available (evenings, weekends or holidays), or when youre out of town and need minor care. Electronic visits cost only $49 and if the [employer] 24/7 provider determines a prescription is needed to treat your condition, one can be electronically transmitted to a nearby pharmacy*. Please take a moment to enroll today if you have not already done so. The enrollment process is free and takes just a few minutes. To enroll, please download the [employer] Voyage yakelin to your tablet or phone, or visit [email or web address] to enroll on your computer. And, as an [address] [place] [address] patient with a BeeFirst.in account, the results of your visits will be scanned into your electronic medical record and your primary care provider will be able to view the scanned results. We urge you to continue to see your regular [facility] provider for your ongoing medical care. And while your primary care provider may not be the one available when you seek a SAFCelldanielfin virtual visit, the peace of mind you get from getting a real diagnosis real time can be priceless. For more information on locr, view our Frequently Asked Questions (FAQs) at [email or web address]. Sincerely, 
 
Daisy Talley MD 
Chief Medical Officer 8 Lissette Tolliver *:  certain medications cannot be prescribed via locr Providers Seen During Your Hospitalization Provider Specialty Primary office phone Jeanette Mccormick, [address] [place] [address] Surgery [phone] Your Primary Care Physician (PCP) Primary Care Physician Office Phone Office Fax Luisana Zuñiga [phone] You are allergic to the following Allergen Reactions Potassium Nausea Only Recent Documentation Height Weight BMI Smoking Status 1.753 m 93.3 kg 30.38 kg/m2 Never Smoker Emergency Contacts Name Discharge Info Relation Home Work Mobile Annetta Mosley DISCHARGE CAREGIVER [3] Spouse [3] [phone] Patient Belongings The following personal items are in your possession at time of discharge: 
  Dental Appliances: None  Visual Aid: Glasses, With patient      Home Medications: None (nitro with wife -Gini Rojas)   Jewelry: None  Clothing: None    Other Valuables: None (All personal items left with wife - Aurora Huitron) Discharge Instructions Attachments/References CHOLECYSTECTOMY: POST-OP (ENGLISH) Patient Handouts Cholecystectomy: What to Expect at [facility] Your Recovery After your surgery, it is normal to feel weak and tired for several days after you return home. Your belly may be swollen. If you had laparoscopic surgery, you may also have pain in your shoulder for about 24 hours. You may have gas or need to burp a lot at first, and a few people get diarrhea. The diarrhea usually goes away in 2 to 4 weeks, but it may last longer. How quickly you recover depends on whether you had a laparoscopic or open surgery. · For a laparoscopic surgery, most people can go back to work or their normal routine in 1 to 2 weeks, but it may take longer, depending on the type of work you do. · For an open surgery, it will probably take 4 to 6 weeks before you get back to your normal routine. This care sheet gives you a general idea about how long it will take for you to recover. However, each person recovers at a different pace. Follow the steps below to get better as quickly as possible. How can you care for yourself at home? Activity 
  · Rest when you feel tired. Getting enough sleep will help you recover.  
  · Try to walk each day. Start out by walking a little more than you did the day before. Gradually increase the amount you walk. Walking boosts blood flow and helps prevent pneumonia and constipation.  
  · For about 2 to 4 weeks, avoid lifting anything that would make you strain.  This may include a child, heavy grocery bags and milk containers, a heavy briefcase or backpack, cat litter or dog food bags, or a vacuum .  
  · Avoid strenuous activities, such as biking, jogging, weightlifting, and aerobic exercise, until your doctor says it is okay.  
  · You may shower 24 to 48 hours after surgery, if your doctor okays it. Pat the cut (incision) dry. Do not take a bath for the first 2 weeks, or until your doctor tells you it is okay.  
  · You may drive when you are no longer taking pain medicine and can quickly move your foot from the gas pedal to the brake. You must also be able to sit comfortably for a long period of time, even if you do not plan to go far. You might get caught in traffic.  
  · For a laparoscopic surgery, most people can go back to work or their normal routine in 1 to 2 weeks, but it may take longer. For an open surgery, it will probably take 4 to 6 weeks before you get back to your normal routine.  
  · Your doctor will tell you when you can have sex again.  
 Diet 
  · Eat smaller meals more often instead of fewer larger meals. You can eat a normal diet, but avoid eating fatty foods for about 1 month. Fatty foods include hamburger, whole milk, cheese, and many snack foods. If your stomach is upset, try bland, low-fat foods like plain rice, broiled chicken, toast, and yogurt. ·  
  · Drink plenty of fluids (unless your doctor tells you not to).   · If you have diarrhea, try avoiding spicy foods, dairy products, fatty foods, and alcohol. You can also watch to see if specific foods cause it, and stop eating them. If the diarrhea continues for more than 2 weeks, talk to your doctor. ·  
  · You may notice that your bowel movements are not regular right after your surgery. This is common. Try to avoid constipation and straining with bowel movements. You may want to take a fiber supplement every day. If you have not had a bowel movement after a couple of days, ask your doctor about taking a mild laxative. Medicines   · Your doctor will tell you if and when you can restart your medicines. He or she will also give you instructions about taking any new medicines.  
  · If you take blood thinners, such as warfarin (Coumadin), clopidogrel (Plavix), or aspirin, be sure to talk to your doctor. He or she will tell you if and when to start taking those medicines again. Make sure that you understand exactly what your doctor wants you to do.  
  · Take pain medicines exactly as directed. ¨ If the doctor gave you a prescription medicine for pain, take it as prescribed. ¨ If you are not taking a prescription pain medicine, take an over-the-counter medicine such as acetaminophen (Tylenol), ibuprofen (Advil, Motrin), or naproxen (Aleve). Read and follow all instructions on the label. ¨ Do not take two or more pain medicines at the same time unless the doctor told you to. Many pain medicines contain acetaminophen, which is Tylenol. Too much Tylenol can be harmful.  
  · If you think your pain medicine is making you sick to your stomach: 
¨ Take your medicine after meals (unless your doctor tells you not to). ¨ Ask your doctor for a different pain medicine.  
  · If your doctor prescribed antibiotics, take them as directed. Do not stop taking them just because you feel better. You need to take the full course of antibiotics. Incision care 
  · If you have strips of tape on the incision, or cut, leave the tape on for a week or until it falls off.  
  · After 24 to 48 hours, wash the area daily with warm, soapy water, and pat it dry.  
  · You may have staples to hold the cut together. Keep them dry until your doctor takes them out. This is usually in 7 to 10 days.  
  · Keep the area clean and dry. You may cover it with a gauze bandage if it weeps or rubs against clothing.  Change the bandage every day.  
 Ice 
  · To reduce swelling and pain, put ice or a cold pack on your belly for 10 to 20 minutes at a time. Do this every 1 to 2 hours. Put a thin cloth between the ice and your skin. Follow-up care is a key part of your treatment and safety. Be sure to make and go to all appointments, and call your doctor if you are having problems. It's also a good idea to know your test results and keep a list of the medicines you take. When should you call for help? Call 911 anytime you think you may need emergency care. For example, call if: 
  · You passed out (lost consciousness).  
  · You are short of breath. [place] your doctor now or seek immediate medical care if: 
  · You are sick to your stomach and cannot drink fluids.  
  · You have pain that does not get better when you take your pain medicine.  
  · You cannot pass stools or gas.  
  · You have signs of infection, such as: 
¨ Increased pain, swelling, warmth, or redness. ¨ Red streaks leading from the incision. ¨ Pus draining from the incision. ¨ A fever.  
  · Bright red blood has soaked through the bandage over your incision.  
  · You have loose stitches, or your incision comes open.  
  · You have signs of a blood clot in your leg (called a deep vein thrombosis), such as: 
¨ Pain in your calf, back of knee, thigh, or groin. ¨ Redness and swelling in your leg or groin.  
 Watch closely for any changes in your health, and be sure to contact your doctor if you have any problems. Where can you learn more? Go to http://tayo-hyun.info/. Enter [phone] in the search box to learn more about \"Cholecystectomy: What to Expect at Home. \" Current as of: May 12, 2017 Content Version: 11.7 © 8055-0027 [employer]. Care instructions adapted under license by [employer] (which disclaims liability or warranty for this information).  If you have questions about a medical condition or this instruction, always ask your healthcare professional. [employer] disclaims any warranty or liability for your use of this information. Please provide this summary of care documentation to your next provider. Signatures-by signing, you are acknowledging that this After Visit Summary has been reviewed with you and you have received a copy. Patient Signature:  ____________________________________________________________ Date:  ____________________________________________________________  
  
[facility] [place] Provider Signature:  ____________________________________________________________ Date:  ____________________________________________________________

## 2018-07-19 NOTE — ANESTHESIA PREPROCEDURE EVALUATION
Anesthetic History   No history of anesthetic complications            Review of Systems / Medical History  Patient summary reviewed and pertinent labs reviewed    Pulmonary          Shortness of breath         Neuro/Psych   Within defined limits           Cardiovascular    Hypertension: well controlled  Valvular problems/murmurs (mild to moderate AI on last ECHO): aortic insufficiency      Dysrhythmias : atrial fibrillation  Past MI (1993), CAD, cardiac stents (x 13; last 2009), CABG and hyperlipidemia    Exercise tolerance: <4 METS  Comments: bASA and elaquis and plavix    Ef 60% on last cath, stents has 12 with mi in [id number], last in 2007, no angina in 6 months. still has signif [place].    GI/Hepatic/Renal     GERD: well controlled    Renal disease (stage 3): CRI and stones       Endo/Other    Diabetes (no meds): well controlled, type 2    Morbid obesity and arthritis     Other Findings   Comments: DDD  Chronic back pain  gout         Physical Exam    Airway  Mallampati: II  TM Distance: 4 - 6 cm  Neck ROM: normal range of motion   Mouth opening: Normal     Cardiovascular    Rhythm: regular  Rate: normal    Murmur: Grade 3, Aortic area     Dental  No notable dental hx       Pulmonary  Breath sounds clear to auscultation               Abdominal  GI exam deferred       Other Findings            Anesthetic Plan    ASA: 4  Anesthesia type: general          Induction: Intravenous  Anesthetic plan and risks discussed with: Patient and Spouse

## 2018-07-19 NOTE — PROGRESS NOTES
Initial support given to wife of patient as she was needing help finding patient's room and then ICU. Support given to wife and patient. I listened at length to wife as she was processing event with . Prayer and support given. Kaylene Fam M.Div.

## 2018-07-20 PROBLEM — R18.8 ASCITES: Status: ACTIVE | Noted: 2018-01-01

## 2018-07-20 PROBLEM — K80.10 CHOLECYSTITIS WITH CHOLELITHIASIS: Chronic | Status: ACTIVE | Noted: 2018-01-01

## 2018-07-20 PROBLEM — J95.821 RESPIRATORY FAILURE, POST-OPERATIVE (HCC): Status: ACTIVE | Noted: 2018-01-01

## 2018-07-20 PROBLEM — K66.9 PERITONEAL LESION: Status: ACTIVE | Noted: 2018-01-01

## 2018-07-20 PROBLEM — D69.6 THROMBOCYTOPENIA (HCC): Status: ACTIVE | Noted: 2018-01-01

## 2018-07-20 PROBLEM — Z90.49 S/P LAPAROSCOPIC CHOLECYSTECTOMY: Status: ACTIVE | Noted: 2018-01-01

## 2018-07-20 NOTE — DISCHARGE INSTRUCTIONS
1. Diet as tolerated except for a  low fat diet after laparoscopic cholecystectomy. 2. Showering is allowed, but no tub baths, hot tubs or swimming. 3. Drainage is common from the wounds. Change the dressings as needed. Call our office if the wounds become reddened, tender, feel warm to the touch or pus starts to drain from the wound. 4. Take prescribed pain medication as directed, usually Percocet, Norco, Ultram or Dilaudid. Take over the counter medication for minor pain. 5. Ice may be applied intermittently to the surgical site or sites. 6. Call or office, [phone], if problems arise. 7. Follow up in the office at the assigned time. DISCHARGE SUMMARY from Nurse    PATIENT INSTRUCTIONS:    After general anesthesia or intravenous sedation, for 24 hours or while taking prescription Narcotics:  · Limit your activities  · Do not drive and operate hazardous machinery  · Do not make important personal or business decisions  · Do  not drink alcoholic beverages  · If you have not urinated within 8 hours after discharge, please contact your surgeon on call. Report the following to your surgeon:  · Excessive pain, swelling, redness or odor of or around the surgical area  · Temperature over 100.5  · Nausea and vomiting lasting longer than 4 hours or if unable to take medications  · Any signs of decreased circulation or nerve impairment to extremity: change in color, persistent  numbness, tingling, coldness or increase pain  · Any questions    What to do at Home:  Recommended activity: Activity as tolerated and No lifting, Driving, or Strenuous exercise until told otherwise by MD.    If you experience any of the following symptoms temperature greater than 101, pain not relieved by any medicaiton, please follow up with MD.    *  Please give a list of your current medications to your Primary Care Provider.     *  Please update this list whenever your medications are discontinued, doses are changed, or new medications (including over-the-counter products) are added. *  Please carry medication information at all times in case of emergency situations. These are general instructions for a healthy lifestyle:    No smoking/ No tobacco products/ Avoid exposure to second hand smoke  Surgeon General's Warning:  Quitting smoking now greatly reduces serious risk to your health. Obesity, smoking, and sedentary lifestyle greatly increases your risk for illness    A healthy diet, regular physical exercise & weight monitoring are important for maintaining a healthy lifestyle    You may be retaining fluid if you have a history of heart failure or if you experience any of the following symptoms:  Weight gain of 3 pounds or more overnight or 5 pounds in a week, increased swelling in our hands or feet or shortness of breath while lying flat in bed. Please call your doctor as soon as you notice any of these symptoms; do not wait until your next office visit. Recognize signs and symptoms of STROKE:    F-face looks uneven    A-arms unable to move or move unevenly    S-speech slurred or non-existent    T-time-call 911 as soon as signs and symptoms begin-DO NOT go       Back to bed or wait to see if you get better-TIME IS BRAIN. Warning Signs of HEART ATTACK     Call 911 if you have these symptoms:   Chest discomfort. Most heart attacks involve discomfort in the center of the chest that lasts more than a few minutes, or that goes away and comes back. It can feel like uncomfortable pressure, squeezing, fullness, or pain.  Discomfort in other areas of the upper body. Symptoms can include pain or discomfort in one or both arms, the back, neck, jaw, or stomach.  Shortness of breath with or without chest discomfort.  Other signs may include breaking out in a cold sweat, nausea, or lightheadedness. Don't wait more than five minutes to call 911 - MINUTES MATTER! Fast action can save your life.  Calling 911 is almost always the fastest way to get lifesaving treatment. Emergency Medical Services staff can begin treatment when they arrive -- up to an hour sooner than if someone gets to the hospital by car. The discharge information has been reviewed with the patient. The patient verbalized understanding. Discharge medications reviewed with the patient and appropriate educational materials and side effects teaching were provided. ___________________________________________________________________________________________________________________________________    8. Resume all medications as taken per surgery, unless specifically instructed not to take certain ones. 9. No lifting more than 25 pounds until told otherwise. 10. Driving is allowed 3 days after surgery as long as you feel comfortable enough to drive and have not taken any prescription pain medication prior to driving. 11. Resume Plavix and Eliquis on 7/23/18.

## 2018-07-20 NOTE — PROGRESS NOTES
GILDA having to follow up with pt. Reviewed chart. Pt seen by Pulmonary. They reviewed CXR and want to hold till tomorrow to check resting and ambulating Sats. Likely just atelectasis. Pt is weak and per PT notes pt desires to having [facility] PT at discharge. Ref placed to [facility] for RN/PT. Hopefully d/c Sat.  Shreya Carrera

## 2018-07-20 NOTE — PROGRESS NOTES
CXR reviewed. Likely atelectasis. Provide IS. Will check resting and ambulating sats tomorrow and can hopefully d/c then.      James Maldonado MD

## 2018-07-20 NOTE — PROGRESS NOTES
Problem: Mobility Impaired (Adult and Pediatric)  Goal: *Acute Goals and Plan of Care (Insert Text)    LTG:  (1.)Mr. Beth Gabriel will move from supine to sit and sit to supine  in bed with STAND BY ASSIST within 7 treatment day(s). (2.)Mr. Beth Gabriel will transfer from bed to chair and chair to bed with STAND BY ASSIST using the least restrictive device within 7 treatment day(s). (3.)Mr. Bteh Gabriel will ambulate with STAND BY ASSIST for 200 feet with the least restrictive device within 7 treatment day(s). (4)Mr. Beth Gabriel will perform HEP with cues and assistance to increase safety on his feet in 7 days. ________________________________________________________________________________________________       PHYSICAL THERAPY: Initial Assessment, PM 7/20/2018  INPATIENT: Hospital Day: 2  [place] / [place] / Product Type: Medicare /      NAME/AGE/GENDER: Myriam Valentin is a 70 y.o. male   PRIMARY DIAGNOSIS: Gallstones [K80.20] Cholecystitis with cholelithiasis Cholecystitis with cholelithiasis  Procedure(s) (LRB):  CHOLECYSTECTOMY ROBOTIC ASSISTED (N/A)  1 Day Post-Op  ICD-10: Treatment Diagnosis:    · Generalized Muscle Weakness (M62.81)  · Other abnormalities of gait and mobility (R26.89)   Precaution/Allergies:  Potassium      ASSESSMENT:     Mr. Beth Gabriel presents with decreased functional mobility and gait s/p Robotic-assisted cholecystectomy plus multiple peritoneal biopsies and 2 Shamir-Cut liver biopsies 7/19/18. He was in chair on contact. He ambulated in the [place] with walker on room air. O2 sats 90% before and after gait. Mild SOB and fatigue. Wife present. He plans to go home with HHPT. He is to borrow a walker. This section established at most recent assessment   PROBLEM LIST (Impairments causing functional limitations):  1. Decreased Strength  2. Decreased ADL/Functional Activities  3. Decreased Transfer Abilities  4.  Decreased Ambulation Ability/Technique  5. Decreased Balance  6. Increased Pain  7. Decreased Activity Tolerance  8. Increased Fatigue  9. Decreased Flexibility/Joint Mobility  10. Decreased [place] with Home Exercise Program   INTERVENTIONS PLANNED: (Benefits and precautions of physical therapy have been discussed with the patient.)  1. Balance Exercise  2. Bed Mobility  3. Family Education  4. Gait Training  5. Home Exercise Program (HEP)  6. Range of Motion (ROM)  7. Therapeutic Activites  8. Therapeutic Exercise/Strengthening  9. Transfer Training     TREATMENT PLAN: Frequency/Duration: daily for duration of hospital stay  Rehabilitation Potential For Stated Goals: Good     RECOMMENDED REHABILITATION/EQUIPMENT: (at time of discharge pending progress): Due to the probability of continued deficits (see above) this patient will likely need continued skilled physical therapy after discharge. Equipment:    to borrow walker              HISTORY:   History of Present Injury/Illness (Reason for Referral):  S/p Robotic-assisted cholecystectomy plus multiple peritoneal biopsies and 2 Shamir-Cut liver biopsies. Past Medical History/Comorbidities:   Mr. Eugenie Escobar  has a past medical history of Aortic valve insufficiency (1/12/2016); Bicuspid aortic valve (1/12/2016); CAD (coronary artery disease); Cataract; Chronic kidney disease, stage III (moderate) (05/17/2016); Chronic kidney failure (1/12/2016); Chronic musculoskeletal pain; Coronary artery disease involving native coronary artery without angina pectoris (5/17/2016); DDD (degenerative disc disease); Diabetes mellitus, type 2 ([facility] [address] GERD (gastroesophageal reflux disease); Gout; History of atrial fibrillation; History of basal cell carcinoma (2010); History of complete eye exam; History of dental examination (08/2016); History of kidney stones; History of MI (myocardial infarction) (1993); History of MRSA infection; Hyperlipidemia; Hypertension;  Morbid obesity with BMI of 40.0-44.9, adult [facility]) (1/12/2016); Murmur (01/12/2016); Prediabetes; Pure hypercholesterolemia (4/22/2016); and 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 Mr. Heri Wasserman  has a past surgical history that includes hx angioplasty ([id number]); hx malignant skin lesion excision; hx urological (1991); hx heent; hx cataract removal (Bilateral); pr cardiac surg procedure unlist; hx heart catheterization; hx back surgery (8/11/15); hx orthopaedic (Left); and hx hip replacement (Left). Social History/Living Environment:   Home Environment: Apartment  # Steps to Enter: 1  Rails to [employer]: No  One/Two Story Residence: One story  Living Alone: No  Support Systems: Spouse/Significant Other/Partner  Patient Expects to be Discharged to[de-identified] Apartment  Current DME Used/Available at Home: Cane, straight, Crutches, Shower chair  Tub or Shower Type: Tub/Shower combination  Prior Level of Function/Work/Activity:  independent   Number of Personal Factors/Comorbidities that affect the Plan of Care: 3+: HIGH COMPLEXITY   EXAMINATION:   Most Recent Physical Functioning:   Gross Assessment:  AROM: Within functional limits  Strength: Generally decreased, functional               Posture:  Posture (WDL): Exceptions to WDL  Posture Assessment: Rounded shoulders  Balance:  Sitting: Intact  Standing: Pull to stand; With support Bed Mobility:  Supine to Sit:  (already up in chair)  Wheelchair Mobility:     Transfers:  Sit to Stand: Additional time;Contact guard assistance (out of chair)  Stand to Sit: Contact guard assistance  Gait:     Speed/Ghazala: Delayed  Step Length: Right shortened;Left shortened  Gait Abnormalities: Decreased step clearance  Distance (ft): 75 Feet (ft)  Assistive Device: Walker, rolling  Ambulation - Level of Assistance: Contact guard assistance  Interventions: Safety awareness training;Verbal cues      Body Structures Involved:  1. Bones  2. Joints  3. Muscles  4. Ligaments Body Functions Affected:  1.  Movement Related Activities and Participation Affected:  1. Mobility   Number of elements that affect the Plan of Care: 3: MODERATE COMPLEXITY   CLINICAL PRESENTATION:   Presentation: Stable and uncomplicated: LOW COMPLEXITY   CLINICAL DECISION MAKIN [facility] [address] [id number] AM-PAC 6 Clicks   Basic Mobility Inpatient Short Form  How much difficulty does the patient currently have. .. Unable A Lot A Little None   1. Turning over in bed (including adjusting bedclothes, sheets and blankets)? [] 1   [] 2   [x] 3   [] 4   2. Sitting down on and standing up from a chair with arms ( e.g., wheelchair, bedside commode, etc.)   [] 1   [] 2   [x] 3   [] 4   3. Moving from lying on back to sitting on the side of the bed? [] 1   [] 2   [x] 3   [] 4   How much help from another person does the patient currently need. .. Total A Lot A Little None   4. Moving to and from a bed to a chair (including a wheelchair)? [] 1   [] 2   [x] 3   [] 4   5. Need to walk in hospital room? [] 1   [] 2   [x] 3   [] 4   6. Climbing 3-5 steps with a railing? [] 1   [] 2   [x] 3   [] 4   © , Trustees of [address] [facility] [address] [address], under license to [employer]. All rights reserved      Score:  Initial: 18 Most Recent: X (Date: -- )    Interpretation of Tool:  Represents activities that are increasingly more difficult (i.e. Bed mobility, Transfers, Gait). Score 24 23 22-20 19-15 14-10 9-7 6     Modifier CH CI CJ CK CL CM CN      ? Mobility - Walking and Moving Around:     - CURRENT STATUS: CK - 40%-59% impaired, limited or restricted    - GOAL STATUS: CJ - 20%-39% impaired, limited or restricted    - D/C STATUS:  ---------------To be determined---------------  [place] / [place] / Product Type: Medicare /      Medical Necessity:     · Patient is expected to demonstrate progress in strength, range of motion and balance to decrease assistance required with theraputic exercises and functional mobility.   Reason for Services/Other Comments:  · Patient continues to require present interventions due to patient's inability to perform theraputic exercises and functional mobility independently. Use of outcome tool(s) and clinical judgement create a POC that gives a: Clear prediction of patient's progress: LOW COMPLEXITY            TREATMENT:   (In addition to Assessment/Re-Assessment sessions the following treatments were rendered)   Pre-treatment Symptoms/Complaints:  Weakness, general abdominal pain  Pain: Initial:      Post Session:  Not rated     Assessment/Reassessment only, no treatment provided today    Braces/Orthotics/Lines/Etc:   · IV  · telemetry  · O2 Device: Nasal cannula  Treatment/Session Assessment:    · Response to Treatment:  Pt. Did fine. General weakness  · Interdisciplinary Collaboration:   o Registered Nurse  o Rehabilitation Attendant  · After treatment position/precautions:   o Up in chair  o Bed/Chair-wheels locked  o Bed in low position  o Caregiver at bedside  o Call light within reach  o RN notified  o Family at bedside   · Compliance with Program/Exercises: Will assess as treatment progresses. · Recommendations/Intent for next treatment session: \"Next visit will focus on advancements to more challenging activities and reduction in assistance provided\".   Total Treatment Duration:  PT Patient Time In/Time Out  Time In: 1440  Time Out: 1 [place], PT

## 2018-07-20 NOTE — PROGRESS NOTES
07/20/18 1711   Dual Skin Pressure Injury Assessment   Dual Skin Pressure Injury Assessment WDL   Second Care Provider (Based on [address] [place] [address]) Ashley Godinez RN

## 2018-07-20 NOTE — PROGRESS NOTES
Virtual Utilization Review RN  has determined this patient meets the Condition Code 44 criteria under the Medicare guidelines for change from Inpatient to observation status. Admission status has been changed in the computer system. A copy of the Utilization Review RN documentation and the [facility] letter explaining the outpatient/observation services was given to patient/family representative with verbal explanation and verbal understanding was received about information given. Letter signed by patient with date and time. Signed copy placed in the patient's chart for scanning by HIS and copy left at bedside for patient information.        DCR

## 2018-07-20 NOTE — CONSULTS
CONSULT NOTE Jose Francesvega 7/20/2018 Date of Admission:  7/19/2018 The patient's chart is reviewed and the patient is discussed with the staff. Subjective:  
 
Patient is a 70 y.o.  male seen and evaluated at the request of Dr. Brooks. He denies any history of underlying lung disease, is a never smoker, and is not on any O2 at home. He came in yesterday for lap jose armando for abd pain and a HIDA scan suggesting biliary dyskinesia. 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 He had liver biopsies performed as well. With 10L of ascites removed post op his BP was borderline and he was on supplemental O2 so decision was made to monitor him in the ICU with expected fluid shifts to follow. This morning he feels relatively well other than some back pain which is chronic. He is, however, still on 4L O2 with sats only 93%. Review of Systems A comprehensive review of systems was negative except for that written in the HPI. Patient Active Problem List  
Diagnosis Code  CAD (coronary artery disease) I25.10  Prediabetes R73.03  
 Atrial Fibrillation, Paroxysmal (HCC) I48.91  
 Obesity E66.9  Pure hypercholesterolemia E78.00  Essential hypertension with goal blood pressure less than 140/90 I10  Chronic kidney disease, stage III (moderate) N18.3  Benign prostatic hyperplasia N40.0  Severe obesity (BMI 35.0-39. 9) with comorbidity ([address] E66.01  
 Anticoagulant long-term use Z79.01  
 Cholecystitis with cholelithiasis K80.10  Hypoxia R09.02  
 S/P laparoscopic cholecystectomy Z90.49  Ascites R18.8  Peritoneal lesion K66.8  Thrombocytopenia ([address] D69.6  Respiratory failure, post-operative ([address] S96.517 Prior to Admission Medications Prescriptions Last Dose Informant Patient Reported? Taking?   
Cholecalciferol, Vitamin D3, (VITAMIN D3) 1,000 unit cap 7/12/2018 at Unknown time  Yes Yes Sig: Take 2,000 Units by mouth daily. TRAVATAN Z 0.004 % ophthalmic solution 7/18/2018 at Unknown time  Yes Yes Sig: Administer 1 Drop to both eyes nightly. Once daily at bedtime  
allopurinol (ZYLOPRIM) 300 mg tablet 7/18/2018 at Unknown time  Yes Yes Sig: Take 300 mg by mouth nightly. Indications: GOUT  
apixaban (ELIQUIS) 5 mg tablet 7/16/2018  No No  
Sig: Take 1 Tab by mouth two (2) times a day. Patient taking differently: Take 5 mg by mouth two (2) times a day. Patient states last dose 7/16/18  
aspirin delayed-release 81 mg tablet 7/19/2018 at 0730  Yes Yes Sig: Take 81 mg by mouth daily. Patient instructed to take  
clopidogrel (PLAVIX) 75 mg tab 7/13/2018  No No  
Sig: Take 1 Tab by mouth daily. Patient taking differently: Take 75 mg by mouth daily. Patient reports last dose 7/13/18  
dronedarone (MULTAQ) tab tablet 7/18/2018 at 0730  No Yes Sig: Take 1 Tab by mouth two (2) times daily (with meals). famotidine (PEPCID) 40 mg tablet 7/19/2018 at 0730  No Yes Sig: Take 1 Tab by mouth nightly. felodipine (PLENDIL SR) 10 mg 24 hr tablet 7/18/2018 at 0730  No Yes Sig: Take 1 Tab by mouth daily. fenofibrate (LOFIBRA) 160 mg tablet 7/18/2018 at Unknown time  No Yes Sig: Take 1 Tab by mouth every morning. finasteride (PROSCAR) 5 mg tablet 7/18/2018 at Unknown time  Yes Yes Sig: Take 5 mg by mouth nightly. isosorbide mononitrate ER (IMDUR) 60 mg CR tablet 7/19/2018 at 0730  No Yes Sig: Take 1 Tab by mouth every morning. lisinopril-hydroCHLOROthiazide (PRINZIDE, ZESTORETIC) 20-25 mg per tablet Not Taking at Unknown time  No No  
Sig: Take 1 Tab by mouth every morning. metoprolol tartrate (LOPRESSOR) 50 mg tablet 7/19/2018 at 0730  No Yes Sig: Take 1 Tab by mouth daily. Patient taking differently: Take 50 mg by mouth daily.  1/2 tab twice a day  
nitroglycerin (NITROSTAT) 0.4 mg SL tablet Not Taking at Unknown time  Yes No Si.4 mg by SubLINGual route every five (5) minutes as needed. Take / use AM day of surgery  per anesthesia protocols if needed. pantoprazole (PROTONIX) 40 mg tablet 2018 at Unknown time  No Yes Sig: Take 1 Tab by mouth nightly. potassium citrate (UROCIT-K 10) 10 mEq (1,080 mg) TbER 2018 at Unknown time  Yes Yes Sig: Take  by mouth two (2) times a day. rosuvastatin (CRESTOR) 20 mg tablet 2018 at Unknown time  No Yes Sig: Take 1 Tab by mouth nightly. simethicone (MYLICON) 80 mg chewable tablet 2018 at 0730  No Yes Sig: Take 1 Tab by mouth every six (6) hours as needed for Flatulence. tamsulosin (FLOMAX) 0.4 mg capsule 2018 at Unknown time  No Yes Sig: Take 1 Cap by mouth daily. Patient taking differently: Take 0.4 mg by mouth nightly. Facility-Administered Medications: None Past Medical History:  
Diagnosis Date  Aortic valve insufficiency 2016  Bicuspid aortic valve 2016  CAD (coronary artery disease) x13, last placed in -Followed by [facility]  Cataract  Chronic kidney disease, stage III (moderate) 2016 Followed by Dr. Alise Perry  Chronic kidney failure 2016  Chronic musculoskeletal pain   
 back and left leg  Coronary artery disease involving native coronary artery without angina pectoris 2016  DDD (degenerative disc disease)  Diabetes mellitus, type 2 ([address] Pre diabetes, no medication required  GERD (gastroesophageal reflux disease) Controlled with medication  Gout  History of atrial fibrillation   
 treated with eliquis and plavix and multaq  History of basal cell carcinoma   
 on hand , back and lip  History of complete eye exam   
 History of dental examination 2016  History of kidney stones  History of MI (myocardial infarction) 48  
 History of MRSA infection  Hyperlipidemia  Hypertension   
 well controlled with medication  Morbid obesity with BMI of 40.0-44.9, adult ([address] 1/12/2016  Murmur 01/12/2016 Echo completed 11/26/12  Prediabetes  Pure hypercholesterolemia 4/22/2016  Status post left hip replacement 8/1/2016 Past Surgical History:  
Procedure Laterality Date  CARDIAC SURG PROCEDURE UNLIST    
 13 stents , last one 5/2009, total of 7 heart caths  HX ANGIOPLASTY  [id number]  
 multiple  HX BACK SURGERY  8/11/15  
 no hardware  HX CATARACT REMOVAL Bilateral   
 HX HEART CATHETERIZATION    
 HX HEENT Dental surgery  HX HIP REPLACEMENT Left  HX MALIGNANT SKIN LESION EXCISION    
 basal cell  HX ORTHOPAEDIC Left   
 hip replacement  HX UROLOGICAL  1991  
 kidney stone extraction Social History Social History  Marital status:  Spouse name: N/A  
 Number of children: N/A  
 Years of education: N/A Occupational History  Not on file. Social History Main Topics  Smoking status: Never Smoker  Smokeless tobacco: Never Used  Alcohol use No  
 Drug use: No  
 Sexual activity: Not on file Other Topics Concern  Not on file Social History Narrative Family History Problem Relation Age of Onset  Kidney Disease Father  Heart Disease Mother  Stroke Mother  Arthritis-osteo Brother  Heart Disease Other  Hypertension Other Allergies Allergen Reactions  Potassium Nausea Only Current Facility-Administered Medications Medication Dose Route Frequency  tuberculin injection 5 Units  5 Units IntraDERMal ONCE  
 allopurinol (ZYLOPRIM) tablet 300 mg  300 mg Oral QHS  aspirin delayed-release tablet 81 mg  81 mg Oral DAILY  dronedarone (MULTAQ) tablet tab 400 mg  400 mg Oral BID WITH MEALS  famotidine (PEPCID) tablet 40 mg  40 mg Oral QHS  felodipine (PLENDIL SR) 24 hr tablet 10 mg (Patient Supplied)  10 mg Oral DAILY  fenofibrate (LOFIBRA) tablet 160 mg  160 mg Oral DAILY  finasteride (PROSCAR) tablet 5 mg  5 mg Oral QHS  isosorbide mononitrate ER (IMDUR) tablet 60 mg  60 mg Oral DAILY  lisinopril-hydroCHLOROthiazide (PRINZIDE, ZESTORETIC) 20-25 mg per tablet 1 Tab  1 Tab Oral DAILY  metoprolol tartrate (LOPRESSOR) tablet 50 mg  50 mg Oral DAILY  nitroglycerin (NITROSTAT) tablet 0.4 mg  0.4 mg SubLINGual PRN  potassium chloride (KLOR-CON) tablet 10 mEq  10 mEq Oral DAILY  rosuvastatin (CRESTOR) tablet 20 mg  20 mg Oral QHS  tamsulosin (FLOMAX) capsule 0.4 mg  0.4 mg Oral DAILY  latanoprost (XALATAN) 0.005 % ophthalmic solution 1 Drop  1 Drop Both Eyes QHS  sodium chloride (NS) flush 5-10 mL  5-10 mL IntraVENous Q8H  
 sodium chloride (NS) flush 5-10 mL  5-10 mL IntraVENous PRN  
 naloxone (NARCAN) injection 0.4 mg  0.4 mg IntraVENous PRN  
 diphenhydrAMINE (BENADRYL) injection 12.5 mg  12.5 mg IntraVENous Q4H PRN  
 LORazepam (ATIVAN) injection 1 mg  1 mg IntraVENous Q6H PRN  
 enoxaparin (LOVENOX) injection 40 mg  40 mg SubCUTAneous Q24H  
 0.9% sodium chloride with KCl 20 mEq/L infusion   IntraVENous CONTINUOUS  promethazine (PHENERGAN) with saline injection 12.5 mg  12.5 mg IntraVENous Q6H PRN  
 ondansetron (ZOFRAN) injection 4 mg  4 mg IntraVENous Q6H PRN  
 oxyCODONE-acetaminophen (PERCOCET) 5-325 mg per tablet 2 Tab  2 Tab Oral Q4H PRN  
 oxyCODONE-acetaminophen (PERCOCET) 5-325 mg per tablet 1 Tab  1 Tab Oral Q4H PRN  
 HYDROmorphone (PF) (DILAUDID) injection 0.5 mg  0.5 mg IntraVENous Q1H PRN  
 HYDROmorphone (PF) (DILAUDID) injection 1 mg  1 mg IntraVENous Q1H PRN  
 enalaprilat (VASOTEC) injection 1.25 mg  1.25 mg IntraVENous Q4H PRN  
 metoprolol (LOPRESSOR) injection 1.25 mg  1.25 mg IntraVENous Q4H PRN  
 zolpidem (AMBIEN) tablet 5 mg  5 mg Oral QHS PRN  
 lip protectant (BLISTEX) ointment   Topical PRN Objective:  
 
Vitals:  
 07/20/18 1071 07/20/18 8172 07/20/18 4949 07/20/18 3416 BP: 115/64  138/65 128/63 Pulse: 86  85 95 Resp: 26  (!) 37 29 Temp: 97.5 °F (36.4 °C) SpO2: 95%  93% 92% Weight:  205 lb 11.2 oz (93.3 kg) Height: PHYSICAL EXAM  
 
Constitutional:  the patient is well developed and in no acute distress EENMT:  Sclera clear, pupils equal, oral mucosa moist 
Respiratory: Breath sounds are clear bilaterally in anterior lung fields. With abd incisions did not sit up to listen posteriorly. Cardiovascular:  RRR without M,G,R 
Gastrointestinal: soft and non-tender; with positive bowel sounds. Musculoskeletal: warm without cyanosis. There is no lower leg edema. Skin:  no jaundice or rashes, surgical abdominal wounds Neurologic: no gross neuro deficits Psychiatric:  alert and oriented x ppt CXR:   
 
 
Recent Labs  
   07/20/18 
 0353  07/19/18 
 0746 WBC  7.4   --   
HGB  12.8*  15.0  
HCT  38.6*   --   
PLT  97*   --   
 
Recent Labs  
   07/20/18 
 0353  07/19/18 
 0746 NA  144   --   
K  3.6  3.4*  
CL  106   --   
GLU  107*   --   
CO2  25   --   
BUN  40*   --   
CREA  1.76*  2.43* CA  8.8   -- No results for input(s): PH, PCO2, PO2, HCO3 in the last 72 hours. No results for input(s): LCAD, LAC in the last 72 hours. Assessment:  (Medical Decision Making) Hospital Problems  Date Reviewed: 7/19/2018 Codes Class Noted POA  
 S/P laparoscopic cholecystectomy ICD-10-CM: Z90.49 ICD-9-CM: V45.89  7/20/2018 Unknown Ascites ICD-10-CM: R18.8 ICD-9-CM: 789.59  7/20/2018 Unknown Peritoneal lesion ICD-10-CM: K66.8 ICD-9-CM: 568.89  7/20/2018 Unknown Thrombocytopenia ([facility] ICD-10-CM: D69.6 ICD-9-CM: 287.5  7/20/2018 Unknown Respiratory failure, post-operative [facility] ICD-10-CM: E46.018 ICD-9-CM: 518.51  7/20/2018 Unknown * (Principal)Cholecystitis with cholelithiasis ICD-10-CM: K80.10 ICD-9-CM: 574.10  7/19/2018 Yes Hypoxia ICD-10-CM: R09.02 
ICD-9-CM: 799.02  7/19/2018 Unknown Patient with post op hypoxic respiratory failure currently on 4L O2. No CXR yet, could be due to pulmonary edema or effusions given the size of his ascites. No wheeze to suggest bronchospastic process. Plan:  (Medical Decision Making)  
 
--will get CXR now to evaluate the cause of his hypoxia. -as this is a new O2 requirement for him he may not be ready for discharge today. More than 50% of the time documented was spent in face-to-face contact with the patient and in the care of the patient on the floor/unit where the patient is located. Thank you very much for this referral.  We appreciate the opportunity to participate in this patient's care. Will follow along with above stated plan.  
 
Jensen Meadows MD

## 2018-07-20 NOTE — PROGRESS NOTES
Problem: Nutrition Deficit  Goal: *Optimize nutritional status  Nutrition  Reason for assessment: Referral received from nursing admission Malnutrition Screening Tool for recently lost >33# without trying and eating poorly due to decreased appetite. Assessment:   Diet order(s): Low Fat  Food/Nutrition Patient History:  Pt with known cholelithiasis; presented with RUQ pain, nausea, occasional vomiting and diarrhea. Past medical history notable for prediabetes, CKD-stage 111, CAD. Pt s/p day 1 lap jose armando; unexpected finding of ascites with over 10 L fluid removed during surgery. Pt lives with spouse; pt states he was ~ 290# last July and has been trying to loose since July 2017. Pt endorses poor po intake past few months; no appetite. Pt eats most of his meals out; breakfast at [facility] [employer] or [employer], skips lunch and supper meal at [employer]. Anthropometrics:Height: 5' 9\" (175.3 cm),  Weight: 93.3 kg (205 lb 11.2 oz), Weight Source: Bed, Body mass index is 30.38 kg/(m^2). BMI class of Overweight for Older American Male. Macronutrient needs:  EER:  0605-3890 kcal /day (20-25 kcal/kg BW)  EPR:  ~ 58 grams protein/day (o.8 grams/kg IBW) GFR 40  Intake/Comparative Standards: No recorded intake; pt verbalizes skim milk and applesauce at lunch; ordered skim milk and fruit cup at supper. Based on verbalized intake, pt potentially meets < 20% estimated kcal needs and ~40% estimated protein needs. Nutrition Diagnosis: Inadequate oral intake r/t decreased ability to consume sufficient oral intake as evidenced by estimates of insufficient intake of energy or high quality protein from diet when compared to requirements. Intervention:  Meals and snacks: Continue current diet. Encouraged patient to try to eat 3 meals/day, between meal snacks if po remains poor. Nutrition Supplement Therapy: Initiated Glucerna Shake supplement on all meal trays; flavor preference noted. Discharge nutrition plan:  Too soon to determine.     Lashell Yeager, [address], [address] [place] [address], MPH  [phone]

## 2018-07-20 NOTE — PROGRESS NOTES
Pt arrived to floor from ICU in stable condition. Admission assessment complete, see flow sheets. Pt alert and oriented x4. Oriented pt and wife to room, nurse call light, and dietary services. Pt denies pain or nausea at this time. No needs voiced. Bed low and locked, call light within reach, side rails x3, wife at bedside. Will continue to monitor.

## 2018-07-20 NOTE — PROGRESS NOTES
General Surgery Progress Note    7/20/2018    Admit Date: 7/19/2018    Subjective:     Surgery POD #1  Patient watched in the ICU overnight. He was hemodynamically stable. Minimal pain. Wants to go home. Pathology called and said the preliminary biopsies of the peritoneum showed adenocarcinoma with special stains to follow. Objective:     Visit Vitals    /63    Pulse 95    Temp 97.5 °F (36.4 °C)    Resp 29    Ht 5' 9\" (1.753 m)    Wt 205 lb 11.2 oz (93.3 kg)    SpO2 92%    BMI 30.38 kg/m2         Intake/Output Summary (Last 24 hours) at 07/20/18 0808  Last data filed at 07/20/18 0513   Gross per 24 hour   Intake          2001.67 ml   Output            36393 ml   Net         -9098.33 ml        EXAM:  ABD soft, mild incisional tenderness, active BS's. Data Review    Recent Results (from the past 24 hour(s))   METABOLIC PANEL, BASIC    Collection Time: 07/20/18  3:53 AM   Result Value Ref Range    Sodium 144 136 - 145 mmol/L    Potassium 3.6 3.5 - 5.1 mmol/L    Chloride 106 98 - 107 mmol/L    CO2 25 21 - 32 mmol/L    Anion gap 13 7 - 16 mmol/L    Glucose 107 (H) 65 - 100 mg/dL    BUN 40 (H) 8 - 23 MG/DL    Creatinine 1.76 (H) 0.8 - 1.5 MG/DL    GFR est AA 49 (L) >60 ml/min/1.73m2    GFR est non-AA 40 (L) >60 ml/min/1.73m2    Calcium 8.8 8.3 - 10.4 MG/DL   CBC W/O DIFF    Collection Time: 07/20/18  3:53 AM   Result Value Ref Range    WBC 7.4 4.3 - 11.1 K/uL    RBC 4.09 (L) 4.23 - 5.67 M/uL    HGB 12.8 (L) 13.6 - 17.2 g/dL    HCT 38.6 (L) 41.1 - 50.3 %    MCV 94.4 79.6 - 97.8 FL    MCH 31.3 26.1 - 32.9 PG    MCHC 33.2 31.4 - 35.0 g/dL    RDW 13.6 11.9 - 14.6 %    PLATELET 97 (L) 034 - 450 K/uL    MPV 13.0 10.8 - 14.1 [facility] Problems  Date Reviewed: 7/19/2018          Codes Class Noted POA    * (Principal)Cholecystitis with cholelithiasis ICD-10-CM: K80.10  ICD-9-CM: 574.10  7/19/2018 Yes        Hypoxia ICD-10-CM: R09.02  ICD-9-CM: 799.02  7/19/2018 Unknown          1.  Low fat diet.  2. Remove St  3. Discharge to home later today. 4. F/U with me on 7/31/18.   [place], MD.

## 2018-07-20 NOTE — PROGRESS NOTES
Continues to rest quietly, awake, resp even, unlab at rest with O2 at 3L N/C. Skin warm, dry. AP 70, regular. Lungs sounds clear, diminished in bases. Abdom soft, tender, distended with active bowel sounds, abd dsgs x 5 clean, dry, intact. Denies any needs, no c/o, no distress. Bed alarm set with pt aware to call for asst to be out of bed.

## 2018-07-20 NOTE — PROGRESS NOTES
Bedside report received from [facility]. Pt resting quietly, easily awakes. VSS, just received PRN pain medication for back pain. VSS in NS at this moment, on 4L NC. Surgical sites x5 on abdominal are CDI. Pt denies needs at this time, call light within reach.

## 2018-07-20 NOTE — PROGRESS NOTES
Notified case management regarding question about placing PPD test if pt is to be discharged home later today. CM stated she would cancel order, but would like a consult to Physical Therapy for weakness below pts baseline.

## 2018-07-20 NOTE — PROGRESS NOTES
Bedside, Verbal and Written shift change report given to Mansoor Barba RN (oncoming nurse) by Lakeshia Lambert RN (offgoing nurse). Report included the following information SBAR, Kardex, ED Summary, OR Summary, Procedure Summary, Intake/Output, MAR, Accordion, Recent Results, Med Rec Status, Cardiac Rhythm NSR and Alarm Parameters .

## 2018-07-20 NOTE — PROGRESS NOTES
TRANSFER - OUT REPORT:    Verbal report given to Kenn Luevano RN (name) on Jose Beasley  being transferred to [address] [place] [address] (unit) for routine progression of care       Report consisted of patients Situation, Background, Assessment and   Recommendations(SBAR). Information from the following report(s) SBAR, OR Summary, Procedure Summary, Intake/Output, MAR, Recent Results, Cardiac Rhythm NSR with AVB and Alarm Parameters  was reviewed with the receiving nurse. Lines:   Peripheral IV 07/19/18 Right Forearm (Active)   Site Assessment Clean, dry, & intact 7/20/2018  9:00 AM   Phlebitis Assessment 0 7/20/2018  9:00 AM   Infiltration Assessment 0 7/20/2018  9:00 AM   Dressing Status Clean, dry, & intact 7/20/2018  9:00 AM   Dressing Type Tape;Transparent 7/20/2018  9:00 AM   Hub Color/Line Status Green;Flushed;Patent; Infusing 7/20/2018  9:00 AM   Alcohol Cap Used No 7/19/2018  8:00 PM        Opportunity for questions and clarification was provided.       Patient transported with:   Tech   Personal belongings

## 2018-07-20 NOTE — PROGRESS NOTES
TRANSFER - IN REPORT:    Verbal report received from [facility] (name) rachna Lares  being received from ICU (unit) for routine progression of care      Report consisted of patients Situation, Background, Assessment and   Recommendations(SBAR). Information from the following report(s) SBAR, Intake/Output, MAR and Recent Results was reviewed with the receiving nurse. Opportunity for questions and clarification was provided. Assessment completed upon patients arrival to unit and care assumed.

## 2018-07-20 NOTE — PROGRESS NOTES
MD at bedside. Verbal orders to remove perkins, wean O2, if pt tolerates low fat diet and remains stable, possible discharge home later today.

## 2018-07-20 NOTE — PROGRESS NOTES
Perkins removed per order, 450 UOP in perkins. Pt up to recliner with moderate assist x2, moderate pain at incisions during standing and sitting. Sites remain CDI. VSS, O2 turned down to 3L, attempting to titrate, O2sat 93-94%.

## 2018-07-21 PROBLEM — J98.11 ATELECTASIS: Status: ACTIVE | Noted: 2018-01-01

## 2018-07-21 PROBLEM — C78.6 PERITONEAL CARCINOMATOSIS (HCC): Status: ACTIVE | Noted: 2018-01-01

## 2018-07-21 PROBLEM — N17.9 ACUTE RENAL FAILURE SUPERIMPOSED ON CHRONIC KIDNEY DISEASE (HCC): Status: ACTIVE | Noted: 2018-01-01

## 2018-07-21 PROBLEM — N18.9 ACUTE RENAL FAILURE SUPERIMPOSED ON CHRONIC KIDNEY DISEASE (HCC): Status: ACTIVE | Noted: 2018-01-01

## 2018-07-21 NOTE — PROGRESS NOTES
Admit Date: 2018    POD 2 Days Post-Op    Procedure:  Procedure(s):  CHOLECYSTECTOMY ROBOTIC ASSISTED   Covering for Dr. Sania Barbour for the weekend. Subjective:     Patient has complaints of pain and excessive leaking from his umbilical wound. .     Objective:     Blood pressure 132/59, pulse 81, temperature 98.5 °F (36.9 °C), resp. rate 16, height 5' 9\" (1.753 m), weight 205 lb 11.2 oz (93.3 kg), SpO2 94 %. Temp (24hrs), Av.3 °F (36.8 °C), Min:97.9 °F (36.6 °C), Max:98.5 °F (36.9 °C)      Physical Exam:  ABDOMEN: He describes some incision pain. His dressing are soaked with ascitic fluid. Labs: No results found for this or any previous visit (from the past 24 hour(s)). Data Review none    Assessment:     Principal Problem:    Cholecystitis with cholelithiasis (2018)    Active Problems:    Hypoxia (2018)      S/P laparoscopic cholecystectomy (2018)      Ascites (2018)      Peritoneal carcinomatosis ([address] (2018)      Thrombocytopenia ([address] (2018)      Respiratory failure, post-operative ([address] (2018)      Atelectasis (2018)      Acute renal failure superimposed on chronic kidney disease ([address] (2018)        Plan/Recommendations/Medical Decision Making:     Continue present treatment   The patient continues under pulmonary care. He will need an ostomy appliance to his incision to control the drainage and wound care follow up for the foreseeable future if it continues. He is pleasant and in no distress. Lap incisions are clean and dry. He reports no chest pain.   Nicole Rojas MD    3

## 2018-07-21 NOTE — PROGRESS NOTES
Patient's sat while resting was 92% on room air before ambulation, while ambulating patient maintained sat above protocol. Patient denied SOB, and no distress noted while ambulating. Lowest sat was 91% on room air.

## 2018-07-21 NOTE — PROGRESS NOTES
Resting quietly, resp even, unlab with O2 intact. Eyes closed with relaxed facial expression. No distress noted.

## 2018-07-21 NOTE — PROGRESS NOTES
Awoke, voided with asst, outer umbilical dsgs changed, blood-tinged serous drainage noted, 4x4 fluff dsgs, 5x9 abd dsg and larger abd dsg applied, securing with tape, tolerating well with no c/o. No distress noted. Reports having slept at intervals.

## 2018-07-21 NOTE — PROGRESS NOTES
Problem: Mobility Impaired (Adult and Pediatric)  Goal: *Acute Goals and Plan of Care (Insert Text)    LTG:  (1.)Mr. Chelsey Hedrick will move from supine to sit and sit to supine  in bed with STAND BY ASSIST within 7 treatment day(s). (2.)Mr. Chelsey Hedrick will transfer from bed to chair and chair to bed with STAND BY ASSIST using the least restrictive device within 7 treatment day(s). (3.)Mr. Chelsey Hedrick will ambulate with STAND BY ASSIST for 200 feet with the least restrictive device within 7 treatment day(s). (4)Mr. Chelsey Hedrick will perform HEP with cues and assistance to increase safety on his feet in 7 days. ________________________________________________________________________________________________       PHYSICAL THERAPY: Daily Note, Treatment Day: 1st, PM 7/21/2018  INPATIENT: Hospital Day: 3  [place] / Product Type: Medicare /      NAME/AGE/GENDER: Sher Becerra is a 70 y.o. male   PRIMARY DIAGNOSIS: Gallstones [K80.20] Cholecystitis with cholelithiasis Cholecystitis with cholelithiasis  Procedure(s) (LRB):  CHOLECYSTECTOMY ROBOTIC ASSISTED (N/A)  2 Days Post-Op  ICD-10: Treatment Diagnosis:    · Generalized Muscle Weakness (M62.81)  · Other abnormalities of gait and mobility (R26.89)   Precaution/Allergies:  Potassium      ASSESSMENT:     Mr. Chelsey Hedrick presents with decreased functional mobility and gait s/p Robotic-assisted cholecystectomy plus multiple peritoneal biopsies and 2 Shamir-Cut liver biopsies 7/19/18. Pt showed increased gait distance & was able to perform gait without drop in 02 sat < 90%. Pt && wife reviewed HEP with written guidelines provided. Pt was encouraged to perform exercises 3 x a day or every 2 hours. This section established at most recent assessment   PROBLEM LIST (Impairments causing functional limitations):  1. Decreased Strength  2. Decreased ADL/Functional Activities  3. Decreased Transfer Abilities  4.  Decreased Ambulation Ability/Technique  5. Decreased Balance  6. Increased Pain  7. Decreased Activity Tolerance  8. Increased Fatigue  9. Decreased Flexibility/Joint Mobility  10. Decreased [place] with Home Exercise Program   INTERVENTIONS PLANNED: (Benefits and precautions of physical therapy have been discussed with the patient.)  1. Balance Exercise  2. Bed Mobility  3. Family Education  4. Gait Training  5. Home Exercise Program (HEP)  6. Range of Motion (ROM)  7. Therapeutic Activites  8. Therapeutic Exercise/Strengthening  9. Transfer Training     TREATMENT PLAN: Frequency/Duration: daily for duration of hospital stay  Rehabilitation Potential For Stated Goals: Good     RECOMMENDED REHABILITATION/EQUIPMENT: (at time of discharge pending progress): Due to the probability of continued deficits (see above) this patient will likely need continued skilled physical therapy after discharge. Equipment:    to borrow walker              HISTORY:   History of Present Injury/Illness (Reason for Referral):  S/p Robotic-assisted cholecystectomy plus multiple peritoneal biopsies and 2 Shamir-Cut liver biopsies. Past Medical History/Comorbidities:   Mr. Gabriele Ward  has a past medical history of Aortic valve insufficiency (1/12/2016); Bicuspid aortic valve (1/12/2016); CAD (coronary artery disease); Cataract; Chronic kidney disease, stage III (moderate) (05/17/2016); Chronic kidney failure (1/12/2016); Chronic musculoskeletal pain; Coronary artery disease involving native coronary artery without angina pectoris (5/17/2016); DDD (degenerative disc disease); Diabetes mellitus, type 2 ([facility] GERD (gastroesophageal reflux disease); Gout; History of atrial fibrillation; History of basal cell carcinoma (2010); History of complete eye exam; History of dental examination (08/2016); History of kidney stones; History of MI (myocardial infarction) (1993); History of MRSA infection; Hyperlipidemia; Hypertension;  Morbid obesity with BMI of 40.0-44.9, adult [facility]) (1/12/2016); Murmur (01/12/2016); Prediabetes; Pure hypercholesterolemia (4/22/2016); and 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 Mr. Beth Gabriel  has a past surgical history that includes hx angioplasty ([id number]); hx malignant skin lesion excision; hx urological (1991); hx heent; hx cataract removal (Bilateral); pr cardiac surg procedure unlist; hx heart catheterization; hx back surgery (8/11/15); hx orthopaedic (Left); and hx hip replacement (Left). Social History/Living Environment:   Home Environment: Apartment  # Steps to Enter: 1  Rails to [employer]: No  One/Two Story Residence: One story  Living Alone: No  Support Systems: Spouse/Significant Other/Partner  Patient Expects to be Discharged to[de-identified] Apartment  Current DME Used/Available at Home: Cane, straight, Crutches, Shower chair  Tub or Shower Type: Tub/Shower combination  Prior Level of Function/Work/Activity:  independent   Number of Personal Factors/Comorbidities that affect the Plan of Care: 3+: HIGH COMPLEXITY   EXAMINATION:   Most Recent Physical Functioning:   Gross Assessment: right UE & both LE 3-/5                       Balance:  Sitting: Intact; Without support  Standing: Impaired; With support (walker) Bed Mobility:  Supine to Sit: Contact guard assistance; Additional time (HOB ~30 deg & rails to right side)  Sit to Supine:  (NT)  Scooting: Contact guard assistance       Transfers:  Sit to Stand: Contact guard assistance  Stand to Sit: Contact guard assistance  Bed to Chair: Contact guard assistance (with walker)  Gait:     Speed/Ghazala: Delayed  Step Length: Left shortened;Right shortened  Gait Abnormalities: Decreased step clearance  Distance (ft): 200 Feet (ft)  Assistive Device: Walker, rolling  Ambulation - Level of Assistance: Contact guard assistance  Interventions: Safety awareness training;Verbal cues  Duration: 15 Minutes      Body Structures Involved:  1. Bones  2. Joints  3. Muscles  4.  Ligaments Body Functions Affected:  1. Movement Related Activities and Participation Affected:  1. Mobility   Number of elements that affect the Plan of Care: 3: MODERATE COMPLEXITY   CLINICAL PRESENTATION:   Presentation: Stable and uncomplicated: LOW COMPLEXITY   CLINICAL DECISION MAKIN [facility] [address] [address] AM-PAC 6 Clicks   Basic Mobility Inpatient Short Form  How much difficulty does the patient currently have. .. Unable A Lot A Little None   1. Turning over in bed (including adjusting bedclothes, sheets and blankets)? [] 1   [] 2   [x] 3   [] 4   2. Sitting down on and standing up from a chair with arms ( e.g., wheelchair, bedside commode, etc.)   [] 1   [] 2   [x] 3   [] 4   3. Moving from lying on back to sitting on the side of the bed? [] 1   [] 2   [x] 3   [] 4   How much help from another person does the patient currently need. .. Total A Lot A Little None   4. Moving to and from a bed to a chair (including a wheelchair)? [] 1   [] 2   [x] 3   [] 4   5. Need to walk in hospital room? [] 1   [] 2   [x] 3   [] 4   6. Climbing 3-5 steps with a railing? [] 1   [] 2   [x] 3   [] 4   © , Trustees of [address] [facility] [address] [address], under license to [employer]. All rights reserved      Score:  Initial: 18 Most Recent: X (Date: -- )    Interpretation of Tool:  Represents activities that are increasingly more difficult (i.e. Bed mobility, Transfers, Gait). Score 24 23 22-20 19-15 14-10 9-7 6     Modifier CH CI CJ CK CL CM CN      ?  Mobility - Walking and Moving Around:     - CURRENT STATUS: CK - 40%-59% impaired, limited or restricted    - GOAL STATUS: CJ - 20%-39% impaired, limited or restricted    - D/C STATUS:  ---------------To be determined---------------  [place] / [place] / Product Type: Medicare /      Medical Necessity:     · Patient is expected to demonstrate progress in strength, range of motion and balance to decrease assistance required with theraputic exercises and functional mobility. Reason for Services/Other Comments:  · Patient continues to require present interventions due to patient's inability to perform theraputic exercises and functional mobility independently. Use of outcome tool(s) and clinical judgement create a POC that gives a: Clear prediction of patient's progress: LOW COMPLEXITY            TREATMENT:   (In addition to Assessment/Re-Assessment sessions the following treatments were rendered)   Pre-treatment Symptoms/Complaints:  Pt eager to work with PT  Pain: Initial: visual scale   Pain Intensity 1: 3  Pain Location 1: Abdomen  Pain Orientation 1: Anterior  Pain Intervention(s) 1: Ambulation/Increased Activity  Post Session:  3/10     Gait Training (15 Minutes):  Gait training to improve and/or restore physical functioning as related to mobility, strength, balance, coordination and dynamic movement of leg - bilateral to improve functional gait. Ambulated 200 Feet (ft) with Contact guard assistance using a Walker, rolling and minimal Safety awareness training;Verbal cues related to their stride length and heel strike to promote proper body alignment, promote proper body posture and promote proper body mechanics. Therapeutic Exercise: (15 Minutes):  Exercises per grid below to improve mobility and dynamic movement of arm - bilateral and leg - bilateral to improve functional endurance. Required minimal verbal cues to promote proper body alignment and promote proper body mechanics. Progressed repetitions as indicated.      Date:  7/21 Date:   Date:     Activity/Exercise Parameters Parameters Parameters   Ankle pumps 10      quad sets 10     Gluteal sets 10      hip ABD-ADD 10      heel slides 10     LAQ's 10      SLR's 10     Overhead press 10      UE push & pull 10      horizontal shoulder ABD -ADD 10                                   Braces/Orthotics/Lines/Etc:   · IV  Treatment/Session Assessment:    · Response to Treatment:  Tolerating better 1 st time without 02 support  · Interdisciplinary Collaboration:   o Registered Nurse  o Respiratory Therapist  · After treatment position/precautions:   o Up in chair  o Bed/Chair-wheels locked  o Caregiver at bedside  o Call light within reach  o RN notified  o Family at bedside   · Compliance with Program/Exercises: Will assess as treatment progresses. · Recommendations/Intent for next treatment session: \"Next visit will focus on advancements to more challenging activities and reduction in assistance provided\".   Total Treatment Duration:  PT Patient Time In/Time Out  Time In: 1540  Time Out: Gary Mcdonnell PT

## 2018-07-21 NOTE — PROGRESS NOTES
Resting quietly, awake. Pain med not yet effective. Abdom dsg with light pink tinged drainage reinforced with 5x9 abd dsg, securing with tape. Gown and bed linens changed. Aware to call for asst to be out of bed. Bed alarm set.

## 2018-07-21 NOTE — PROGRESS NOTES
Awake with no c/o during bedside report given to Subhash Stapleton RN. Up to bathroom with asst for BM. Agrees to call for asst before standing, to return to bed.

## 2018-07-21 NOTE — PROGRESS NOTES
Continues to arouse easily at intervals, up with asst at bedside, voiding into urinal and back to bed. Umbilical abd dsg with shadowed serous drainage noted and removed in addition to saturated 4x4 fluff dsgs, leaving original 4x4 dsgs from surgery, site covered with 4x4 fluff dsgs, 5x9 abd dsg and larger abd dsg, securing with tape, tolerating well with no c/o. Repositioned, head of bed elevated, reporting comfortable. No distress.

## 2018-07-21 NOTE — PROGRESS NOTES
Resting quietly in bed, resp even, unlab at rest with O2 intact at 3L N/C. Abdom dsg clean, dry, intact. Agrees to call for all needs, including asst to be out of bed. Bed alarm set. No c/o. No distress.

## 2018-07-21 NOTE — PROGRESS NOTES
Myriam Valentin Admission Date: 7/19/2018 Daily Progress Note: 7/21/2018 The patient's chart is reviewed and the patient is discussed with the staff. Patient is a 70 y.o.  male seen and evaluated at the request of Dr. Fidel Beckett. He denies any history of underlying lung disease, is a never smoker, and is not on any O2 at home. He came in yesterday for lap jose armando for abd pain and a HIDA scan suggesting biliary dyskinesia. 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 He had liver biopsies performed as well. With 10L of ascites removed post op his BP was borderline and he was on supplemental O2 so decision was made to monitor him in the ICU with expected fluid shifts to follow. Subjective:  
 
Remains on 3L oxygen with sat 92. Afebrile. CXR yesterday with bibasilar atelectasis c/w effects of large volume of ascites he had. Hemodynamics OK. Renal function improved. Pt aware of his cancer diagnosis. Having considerable drainage from wound. Current Facility-Administered Medications Medication Dose Route Frequency  tuberculin injection 5 Units  5 Units IntraDERMal ONCE  
 allopurinol (ZYLOPRIM) tablet 300 mg  300 mg Oral QHS  aspirin delayed-release tablet 81 mg  81 mg Oral DAILY  dronedarone (MULTAQ) tablet tab 400 mg  400 mg Oral BID WITH MEALS  famotidine (PEPCID) tablet 40 mg  40 mg Oral QHS  felodipine (PLENDIL SR) 24 hr tablet 10 mg (Patient Supplied)  10 mg Oral DAILY  fenofibrate (LOFIBRA) tablet 160 mg  160 mg Oral DAILY  finasteride (PROSCAR) tablet 5 mg  5 mg Oral QHS  isosorbide mononitrate ER (IMDUR) tablet 60 mg  60 mg Oral DAILY  lisinopril-hydroCHLOROthiazide (PRINZIDE, ZESTORETIC) 20-25 mg per tablet 1 Tab  1 Tab Oral DAILY  metoprolol tartrate (LOPRESSOR) tablet 50 mg  50 mg Oral DAILY  nitroglycerin (NITROSTAT) tablet 0.4 mg  0.4 mg SubLINGual PRN  potassium chloride (KLOR-CON) tablet 10 mEq  10 mEq Oral DAILY  rosuvastatin (CRESTOR) tablet 20 mg  20 mg Oral QHS  tamsulosin (FLOMAX) capsule 0.4 mg  0.4 mg Oral DAILY  latanoprost (XALATAN) 0.005 % ophthalmic solution 1 Drop  1 Drop Both Eyes QHS  sodium chloride (NS) flush 5-10 mL  5-10 mL IntraVENous Q8H  
 sodium chloride (NS) flush 5-10 mL  5-10 mL IntraVENous PRN  
 naloxone (NARCAN) injection 0.4 mg  0.4 mg IntraVENous PRN  
 diphenhydrAMINE (BENADRYL) injection 12.5 mg  12.5 mg IntraVENous Q4H PRN  
 LORazepam (ATIVAN) injection 1 mg  1 mg IntraVENous Q6H PRN  
 enoxaparin (LOVENOX) injection 40 mg  40 mg SubCUTAneous Q24H  
 0.9% sodium chloride with KCl 20 mEq/L infusion   IntraVENous CONTINUOUS  promethazine (PHENERGAN) with saline injection 12.5 mg  12.5 mg IntraVENous Q6H PRN  
 ondansetron (ZOFRAN) injection 4 mg  4 mg IntraVENous Q6H PRN  
 oxyCODONE-acetaminophen (PERCOCET) 5-325 mg per tablet 2 Tab  2 Tab Oral Q4H PRN  
 oxyCODONE-acetaminophen (PERCOCET) 5-325 mg per tablet 1 Tab  1 Tab Oral Q4H PRN  
 HYDROmorphone (PF) (DILAUDID) injection 0.5 mg  0.5 mg IntraVENous Q1H PRN  
 HYDROmorphone (PF) (DILAUDID) injection 1 mg  1 mg IntraVENous Q1H PRN  
 enalaprilat (VASOTEC) injection 1.25 mg  1.25 mg IntraVENous Q4H PRN  
 metoprolol (LOPRESSOR) injection 1.25 mg  1.25 mg IntraVENous Q4H PRN  
 zolpidem (AMBIEN) tablet 5 mg  5 mg Oral QHS PRN  
 lip protectant (BLISTEX) ointment   Topical PRN Review of Systems Constitutional: negative for fever, chills, sweats Cardiovascular: negative for chest pain, palpitations, syncope, edema Gastrointestinal:  negative for dysphagia, reflux, vomiting, diarrhea, abdominal pain, or melena Neurologic:  negative for focal weakness, numbness, headache Objective:  
 
Vitals:  
 07/20/18 1711 07/20/18 1951 07/21/18 0058 07/21/18 0345 BP: 116/58 100/56 122/62 116/57 Pulse: 69 70 61 68 Resp: 24 22 18 24 Temp: 98.4 °F (36.9 °C) 97.9 °F (36.6 °C) 98.1 °F (36.7 °C) 98.1 °F (36.7 °C) SpO2: 93% 93% 90% 92% Weight:      
Height:      
 
Intake and Output:  
07/19 1901 - 07/21 0700 In: 1130 [I.V.:1130] Out: 1950 [PTQYE:9700] Physical Exam:  
Constitution:  the patient is well developed and in no acute distress on 3L EENMT:  Sclera clear, pupils equal, oral mucosa moist 
Respiratory: Slightly decreased BS at bases Cardiovascular:  RRR with 1/6 SM LSB Gastrointestinal: soft and slightly distended; with hypoactive bowel sounds. Musculoskeletal: warm without cyanosis. There is no lower leg edema. Skin:  no jaundice or rashes Neurologic: no gross neuro deficits Psychiatric:  alert and oriented x 3 CXR:  
 
 
 
 
LAB Recent Labs  
   07/19/18 
 0746  07/19/18 
 0744 GLUCPOC  80  11* Recent Labs  
   07/20/18 
 0353  07/19/18 
 0746 WBC  7.4   --   
HGB  12.8*  15.0  
HCT  38.6*   --   
PLT  97*   --   
 
Recent Labs  
   07/20/18 
 0353  07/19/18 
 0746 NA  144   --   
K  3.6  3.4*  
CL  106   --   
CO2  25   --   
GLU  107*   --   
BUN  40*   --   
CREA  1.76*  2.43* CA  8.8   -- No results for input(s): PH, PCO2, PO2, HCO3 in the last 72 hours. No results for input(s): LCAD, LAC in the last 72 hours. Peritoneal fluid: atypical cells. Assessment:  (Medical Decision Making) Hospital Problems  Date Reviewed: 7/19/2018 Codes Class Noted POA Atelectasis ICD-10-CM: J98.11 ICD-9-CM: 518.0  7/21/2018 Unknown Likely from prior ascites. Acute renal failure superimposed on chronic kidney disease ([facility] [address] ICD-10-CM: N17.9, N18.9 ICD-9-CM: 584.9, 585.9  7/21/2018 Unknown Cr down. S/P laparoscopic cholecystectomy ICD-10-CM: Z90.49 ICD-9-CM: V45.89  7/20/2018 Unknown Ascites ICD-10-CM: R18.8 ICD-9-CM: 789.59  7/20/2018 Unknown From peritoneal cancer. Peritoneal carcinomatosis ([facility] [address] ICD-10-CM: C78.6, C80.1 ICD-9-CM: 197.6, 199.1  7/20/2018 Unknown Preliminary Dx adenocarcinoma. Oncology was to be consulted as OP per pt. Thrombocytopenia ([facility] ICD-10-CM: D69.6 ICD-9-CM: 287.5  7/20/2018 Unknown Respiratory failure, post-operative ([facility] ICD-10-CM: Q58.465 ICD-9-CM: 518.51  7/20/2018 Unknown Remains on oxygen. * (Principal)Cholecystitis with cholelithiasis (Chronic) ICD-10-CM: K80.10 ICD-9-CM: 574.10  7/19/2018 Yes Hypoxia ICD-10-CM: R09.02 
ICD-9-CM: 799.02  7/19/2018 Unknown Ongoing Plan:  (Medical Decision Making) Albuterol with IPPB. 
OOB. Wean oxygen as tolerated. Not ready for discharge. CXR tomorrow. -- 
 
 
 
More than 50% of the time documented was spent in face-to-face contact with the patient and in the care of the patient on the floor/unit where the patient is located.  
 
Gladys Martinez MD

## 2018-07-21 NOTE — PROGRESS NOTES
Sat up at bedside with asst by DIANNE Bass, voiding into urinal. Umbilical dsg saturated and draining large amount of lightly blood tinged, serous drainage. Gown and bed linens changed. Outer 5x9 abd dsg removed by Yvonne Paniagua RN with 4x4 dsgs and large abd dsg applied, securing with tape, tolerating well. Back to bed with asst. Bed alarm set.

## 2018-07-21 NOTE — PROGRESS NOTES
Woke up, briefly disoriented. Re-oriented easily, answering nurse appropriately. Room temp adjusted and small light left on to facilitate comfort. No distress.

## 2018-07-22 NOTE — PROGRESS NOTES
Dozing, arousing easily. Reports pain improving, rated \"6\". Ostomy bag over umbilical incision emptied of 15 ml clear, orange drainage, incision with staples intact.

## 2018-07-22 NOTE — PROGRESS NOTES
Awake, restless with c/o pain rated \"9\"; Dilaudid 0.5 mg given IVP slowly for discomfort. Bed alarm set.

## 2018-07-22 NOTE — PROGRESS NOTES
Shift assessment complete. Resting in chair quietly. Pt A& O x4. Respirations even, unlabored. HR irregular with murmur auscultated. Abdomen soft, distended, tender. Active bowel sounds in all 4 quadrants. Abdominal incision sites, staples intact. Umbilical incision with ostomy bag intact, serosanguinous drainage. IVF infusing without difficulty. Chair wheels locked, chair alarm on, door open. Pt instructed to call for help for assistance out of chair. All needs met at this time. Will continue to monitor throughout shift.

## 2018-07-22 NOTE — PROGRESS NOTES
Continues to sit quietly in chair, arousing easily for O2 to be reapplied at 3L N/C with no c/o during bedside report given to Jennyfer Cantu RN. Chair alarm set, call light in reach. No distress noted.

## 2018-07-22 NOTE — PROGRESS NOTES
Resting quietly, resp even, unlab. Eyes closed with relaxed facial expression. Awoke easily. Skin warm, dry. AP 74, irregular with murmur auscultated. Abdom soft, distended, tender with active bowel sounds. Abdom punct sites x4 with staples intact, mid abdom umbilical incision with ostomy bag intact, drainage serosanguinous. No c/o. No distress. Aware to mikayla for asst to be out of bed and for all needs. Bed alarm set. Door open.

## 2018-07-22 NOTE — PROGRESS NOTES
Pt complains of pain of 8 on scale 0-10. Medicated with 0.5 mg Dilaudid IV. All other needs addressed at this time.

## 2018-07-22 NOTE — PROGRESS NOTES
Admit Date: 2018    POD 3 Days Post-Op    Procedure:  Procedure(s):  CHOLECYSTECTOMY ROBOTIC ASSISTED    Subjective:     Patient has no new complaints. O2 weaning. No new issues. Ostomy bag to catch ascites drainage. Controlled. He feels well. Objective:     Blood pressure 127/62, pulse 78, temperature 98.2 °F (36.8 °C), resp. rate 18, height 5' 9\" (1.753 m), weight 205 lb 11.2 oz (93.3 kg), SpO2 91 %. Temp (24hrs), Av.2 °F (36.8 °C), Min:98 °F (36.7 °C), Max:98.5 °F (36.9 °C)      Physical Exam:  HEART: regular rate and rhythm, ABDOMEN: soft, non-tender. Bowel sounds normal. No masses,  no organomegaly, EXTREMITIES:  extremities normal, atraumatic, no cyanosis or edema    Labs:   Recent Results (from the past 24 hour(s))   HEMOGLOBIN A1C WITH EAG    Collection Time: 18 10:28 AM   Result Value Ref Range    Hemoglobin A1c 5.9 4.8 - 6.0 %    Est. average glucose 123 mg/dL   CBC WITH AUTOMATED DIFF    Collection Time: 18  6:25 AM   Result Value Ref Range    WBC 4.7 4.3 - 11.1 K/uL    RBC 3.99 (L) 4.23 - 5.67 M/uL    HGB 12.7 (L) 13.6 - 17.2 g/dL    HCT 38.1 (L) 41.1 - 50.3 %    MCV 95.5 79.6 - 97.8 FL    MCH 31.8 26.1 - 32.9 PG    MCHC 33.3 31.4 - 35.0 g/dL    RDW 14.2 11.9 - 14.6 %    PLATELET 95 (L) 498 - 450 K/uL    MPV 13.7 10.8 - 14.1 FL    DF AUTOMATED      NEUTROPHILS 78 43 - 78 %    LYMPHOCYTES 11 (L) 13 - 44 %    MONOCYTES 9 4.0 - 12.0 %    EOSINOPHILS 2 0.5 - 7.8 %    BASOPHILS 0 0.0 - 2.0 %    IMMATURE GRANULOCYTES 0 0.0 - 5.0 %    ABS. NEUTROPHILS 3.7 1.7 - 8.2 K/UL    ABS. LYMPHOCYTES 0.5 0.5 - 4.6 K/UL    ABS. MONOCYTES 0.4 0.1 - 1.3 K/UL    ABS. EOSINOPHILS 0.1 0.0 - 0.8 K/UL    ABS. BASOPHILS 0.0 0.0 - 0.2 K/UL    ABS. IMM.  GRANS. 0.0 0.0 - 0.5 K/UL   METABOLIC PANEL, BASIC    Collection Time: 18  6:25 AM   Result Value Ref Range    Sodium 145 136 - 145 mmol/L    Potassium 3.5 3.5 - 5.1 mmol/L    Chloride 109 (H) 98 - 107 mmol/L    CO2 28 21 - 32 mmol/L    Anion gap 8 7 - 16 mmol/L    Glucose 118 (H) 65 - 100 mg/dL    BUN 24 (H) 8 - 23 MG/DL    Creatinine 1.26 0.8 - 1.5 MG/DL    GFR est AA >60 >60 ml/min/1.73m2    GFR est non-AA 59 (L) >60 ml/min/1.73m2    Calcium 8.7 8.3 - 10.4 MG/DL       Data Review images and reports reviewed    Assessment:     Principal Problem:    Cholecystitis with cholelithiasis (7/19/2018)    Active Problems:    Hypoxia (7/19/2018)      S/P laparoscopic cholecystectomy (7/20/2018)      Ascites (7/20/2018)      Peritoneal carcinomatosis ([address] (7/20/2018)      Thrombocytopenia ([address] (7/20/2018)      Respiratory failure, post-operative ([address] (7/20/2018)      Atelectasis (7/21/2018)      Acute renal failure superimposed on chronic kidney disease ([address] (7/21/2018)        Plan/Recommendations/Medical Decision Making:     Continue present treatment   OK to discharge when OK with pulmonary. He is doing well. He needs to learn how to handle his drainage. Stable.   Nicole Rojas MD

## 2018-07-22 NOTE — PROGRESS NOTES
Problem: Falls - Risk of  Goal: *Absence of Falls  Document [place] Fall Risk and appropriate interventions in the flowsheet. Outcome: Progressing Towards Goal  Fall Risk Interventions:  Mobility Interventions: Patient to call before getting OOB    Mentation Interventions: Bed/chair exit alarm    Medication Interventions: Bed/chair exit alarm, Teach patient to arise slowly    Elimination Interventions: Call light in reach, Bed/chair exit alarm    History of Falls Interventions: Bed/chair exit alarm        Problem: Pressure Injury - Risk of  Goal: *Prevention of pressure injury  Document [place] Scale and appropriate interventions in the flowsheet.    Outcome: Progressing Towards Goal  Pressure Injury Interventions:       Moisture Interventions: Maintain skin hydration (lotion/cream)    Activity Interventions: PT/OT evaluation, Pressure redistribution bed/mattress(bed type)    Mobility Interventions: Pressure redistribution bed/mattress (bed type), PT/OT evaluation    Nutrition Interventions: Offer support with meals,snacks and hydration                    Problem: Nutrition Deficit  Goal: *Optimize nutritional status  Outcome: Progressing Towards Goal  GI soft diet

## 2018-07-22 NOTE — PROGRESS NOTES
Gissel January Admission Date: 7/19/2018 Daily Progress Note: 7/22/2018 The patient's chart is reviewed and the patient is discussed with the staff. Patient is a 70 y.o.  male seen and evaluated at the request of Dr. Amari Holden. He denies any history of underlying lung disease, is a never smoker, and is not on any O2 at home. He came in yesterday for lap jose armando for abd pain and a HIDA scan suggesting biliary dyskinesia. 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 He had liver biopsies performed as well. With 10L of ascites removed post op his BP was borderline and he was on supplemental O2 so decision was made to monitor him in the ICU with expected fluid shifts to follow. Subjective:  
 
Remains on 2L oxygen with sat 93. Afebrile. Renal function improving. Up in chair. Still draining a lot of fluid from abdominal wound. Current Facility-Administered Medications Medication Dose Route Frequency  albuterol (PROVENTIL VENTOLIN) nebulizer solution 2.5 mg  2.5 mg Nebulization QID RT  
 tuberculin injection 5 Units  5 Units IntraDERMal ONCE  
 allopurinol (ZYLOPRIM) tablet 300 mg  300 mg Oral QHS  aspirin delayed-release tablet 81 mg  81 mg Oral DAILY  dronedarone (MULTAQ) tablet tab 400 mg  400 mg Oral BID WITH MEALS  famotidine (PEPCID) tablet 40 mg  40 mg Oral QHS  felodipine (PLENDIL SR) 24 hr tablet 10 mg (Patient Supplied)  10 mg Oral DAILY  fenofibrate (LOFIBRA) tablet 160 mg  160 mg Oral DAILY  finasteride (PROSCAR) tablet 5 mg  5 mg Oral QHS  isosorbide mononitrate ER (IMDUR) tablet 60 mg  60 mg Oral DAILY  lisinopril-hydroCHLOROthiazide (PRINZIDE, ZESTORETIC) 20-25 mg per tablet 1 Tab  1 Tab Oral DAILY  metoprolol tartrate (LOPRESSOR) tablet 50 mg  50 mg Oral DAILY  nitroglycerin (NITROSTAT) tablet 0.4 mg  0.4 mg SubLINGual PRN  potassium chloride (KLOR-CON) tablet 10 mEq  10 mEq Oral DAILY  rosuvastatin (CRESTOR) tablet 20 mg  20 mg Oral QHS  tamsulosin (FLOMAX) capsule 0.4 mg  0.4 mg Oral DAILY  latanoprost (XALATAN) 0.005 % ophthalmic solution 1 Drop  1 Drop Both Eyes QHS  sodium chloride (NS) flush 5-10 mL  5-10 mL IntraVENous Q8H  
 sodium chloride (NS) flush 5-10 mL  5-10 mL IntraVENous PRN  
 naloxone (NARCAN) injection 0.4 mg  0.4 mg IntraVENous PRN  
 diphenhydrAMINE (BENADRYL) injection 12.5 mg  12.5 mg IntraVENous Q4H PRN  
 LORazepam (ATIVAN) injection 1 mg  1 mg IntraVENous Q6H PRN  
 enoxaparin (LOVENOX) injection 40 mg  40 mg SubCUTAneous Q24H  
 0.9% sodium chloride with KCl 20 mEq/L infusion   IntraVENous CONTINUOUS  promethazine (PHENERGAN) with saline injection 12.5 mg  12.5 mg IntraVENous Q6H PRN  
 ondansetron (ZOFRAN) injection 4 mg  4 mg IntraVENous Q6H PRN  
 oxyCODONE-acetaminophen (PERCOCET) 5-325 mg per tablet 2 Tab  2 Tab Oral Q4H PRN  
 oxyCODONE-acetaminophen (PERCOCET) 5-325 mg per tablet 1 Tab  1 Tab Oral Q4H PRN  
 HYDROmorphone (PF) (DILAUDID) injection 0.5 mg  0.5 mg IntraVENous Q1H PRN  
 HYDROmorphone (PF) (DILAUDID) injection 1 mg  1 mg IntraVENous Q1H PRN  
 enalaprilat (VASOTEC) injection 1.25 mg  1.25 mg IntraVENous Q4H PRN  
 metoprolol (LOPRESSOR) injection 1.25 mg  1.25 mg IntraVENous Q4H PRN  
 zolpidem (AMBIEN) tablet 5 mg  5 mg Oral QHS PRN  
 lip protectant (BLISTEX) ointment   Topical PRN Review of Systems Constitutional: negative for fever, chills, sweats Cardiovascular: negative for chest pain, palpitations, syncope, edema Gastrointestinal:  negative for dysphagia, reflux, vomiting, diarrhea, abdominal pain, or melena Neurologic:  negative for focal weakness, numbness, headache Objective:  
 
Vitals:  
 07/21/18 1705 07/21/18 2029 07/21/18 2033 07/22/18 0034 BP:   120/58 127/62 Pulse:   72 78 Resp:   18 18 Temp:   98.2 °F (36.8 °C) 98.2 °F (36.8 °C) SpO2: 91% 92% 91% 91% Weight:      
Height:      
 
Intake and Output:  
07/20 1901 - 07/22 0700 In: 2442 [I.V.:2442] Out: 1345 [QJR:3331] 07/22 0701 - 07/22 1900 In: -  
Out: 100 [Urine:100] Physical Exam:  
Constitution:  the patient is well developed and in no acute distress on 2L 
EENMT:  Sclera clear, pupils equal, oral mucosa moist 
Respiratory: Slightly decreased; no wheezes Cardiovascular:  RRR with 1/6 SM LSB Gastrointestinal: soft and slightly distended; with hypoactive bowel sounds. Musculoskeletal: warm without cyanosis. There is no lower leg edema. Skin:  no jaundice or rashes Neurologic: no gross neuro deficits Psychiatric:  alert and oriented x 3 CXR:  
 
7/22: 
 
 
 
 
7/20: 
 
 
 
 
LAB Recent Labs  
   07/19/18 
 0746  07/19/18 
 0744 GLUCPOC  80  11* Recent Labs  
   07/22/18 
 2586  07/20/18 
 0353  07/19/18 
 4176 WBC  4.7  7.4   --   
HGB  12.7*  12.8*  15.0  
HCT  38.1*  38.6*   --   
PLT  95*  97*   --   
 
Recent Labs  
   07/22/18 
 0625  07/20/18 
 0353  07/19/18 
 0746 NA  145  144   --   
K  3.5  3.6  3.4*  
CL  109*  106   --   
CO2  28  25   --   
GLU  118*  107*   --   
BUN  24*  40*   --   
CREA  1.26  1.76*  2.43* CA  8.7  8.8   -- No results for input(s): PH, PCO2, PO2, HCO3 in the last 72 hours. No results for input(s): LCAD, LAC in the last 72 hours. Peritoneal fluid: atypical cells. Assessment:  (Medical Decision Making) Hospital Problems  Date Reviewed: 7/19/2018 Codes Class Noted POA Atelectasis ICD-10-CM: J98.11 ICD-9-CM: 518.0  7/21/2018 Unknown Likely from prior ascites. Better on CXR. Acute renal failure superimposed on chronic kidney disease ([facility] [address] ICD-10-CM: N17.9, N18.9 ICD-9-CM: 584.9, 585.9  7/21/2018 Unknown Cr improved further. S/P laparoscopic cholecystectomy ICD-10-CM: Z90.49 ICD-9-CM: V45.89  7/20/2018 Unknown  Ascites ICD-10-CM: R18.8 ICD-9-CM: 789.59  7/20/2018 Unknown From peritoneal cancer. Ongoing drainage managed by surgery. Peritoneal carcinomatosis ([facility] ICD-10-CM: C78.6, C80.1 ICD-9-CM: 197.6, 199.1  7/20/2018 Unknown Preliminary Dx adenocarcinoma. Oncology was to be consulted as OP per pt. Thrombocytopenia ([facility] ICD-10-CM: D69.6 ICD-9-CM: 287.5  7/20/2018 Unknown Ongoing Respiratory failure, post-operative ([facility] ICD-10-CM: L35.329 ICD-9-CM: 518.51  7/20/2018 Unknown Remains on oxygen. * (Principal)Cholecystitis with cholelithiasis (Chronic) ICD-10-CM: K80.10 ICD-9-CM: 574.10  7/19/2018 Yes Hypoxia ICD-10-CM: R09.02 
ICD-9-CM: 799.02  7/19/2018 Unknown Ongoing. Try to wean further. Plan:  (Medical Decision Making) Albuterol with IPPB. OOB and ambulate. Stop IVF. Wean oxygen as tolerated. Not ready for discharge. If hypoxemia persists, may need CTof chest. 
 
-- 
 
 
 
More than 50% of the time documented was spent in face-to-face contact with the patient and in the care of the patient on the floor/unit where the patient is located.  
 
Nesha Myrick MD

## 2018-07-22 NOTE — PROGRESS NOTES
Back to bed with asst after voiding. Ambien 5 mg given PO for c/o insomnia. Bed alarm set, call light within reach, door open.

## 2018-07-22 NOTE — PROGRESS NOTES
Communication with Alo Bush RN with [facility]. Order submitted for PT/RN, and nurse ordered A1C. Discharge milestones added by CM.      Scott Troncoso, [address] [facility]    [address] [facility]  [phone]

## 2018-07-22 NOTE — PROGRESS NOTES
Problem: Mobility Impaired (Adult and Pediatric)  Goal: *Acute Goals and Plan of Care (Insert Text)    GOALS MODIFIED 7/22/18 :  (1.)Mr. Gabriele Ward will move from supine to sit and sit to supine  in bed with STAND BY ASSIST (consistently). (2.)Mr. Gabriele Ward will transfer from bed to chair and chair to bed with STAND BY ASSIST (consistently) using a walker  (3.)Mr. Gabriele Ward will ambulate with STAND BY ASSIST (consistently) for 200 feet with a rolling walker  (4)Mr. Gabriele Ward will perform HEP with written guidelines 3-4 x a day. *RE-ASSESS BY 7/29/18*  ________________________________________________________________________________________________       PHYSICAL THERAPY: Daily Note, Treatment Day: 2nd, AM 7/22/2018  INPATIENT: Hospital Day: 4  [place] / [place] / Product Type: Medicare /      NAME/AGE/GENDER: Gem Lowery is a 70 y.o. male   PRIMARY DIAGNOSIS: Gallstones [K80.20] Cholecystitis with cholelithiasis Cholecystitis with cholelithiasis  Procedure(s) (LRB):  CHOLECYSTECTOMY ROBOTIC ASSISTED (N/A)  3 Days Post-Op  ICD-10: Treatment Diagnosis:    · Generalized Muscle Weakness (M62.81)  · Other abnormalities of gait and mobility (R26.89)   Precaution/Allergies:  Potassium      ASSESSMENT:     Mr. Gabriele Ward showed improved gait quality & level of assist for transfers & gait. Pt needs to be more consistent with HEP & incentive spirometer. This section established at most recent assessment   PROBLEM LIST (Impairments causing functional limitations):  1. Decreased Strength  2. Decreased ADL/Functional Activities  3. Decreased Transfer Abilities  4. Decreased Ambulation Ability/Technique  5. Decreased Balance  6. Increased Pain  7. Decreased Activity Tolerance  8. Increased Fatigue  9. Decreased Flexibility/Joint Mobility  10.  Decreased [place] with Home Exercise Program   INTERVENTIONS PLANNED: (Benefits and precautions of physical therapy have been discussed with the patient.)  1. Balance Exercise  2. Bed Mobility  3. Family Education  4. Gait Training  5. Home Exercise Program (HEP)  6. Range of Motion (ROM)  7. Therapeutic Activites  8. Therapeutic Exercise/Strengthening  9. Transfer Training     TREATMENT PLAN: Frequency/Duration: daily for duration of hospital stay  Rehabilitation Potential For Stated Goals: Good     RECOMMENDED REHABILITATION/EQUIPMENT: (at time of discharge pending progress): Due to the probability of continued deficits (see above) this patient will likely need continued skilled physical therapy after discharge. Equipment:    to [employer] walker              HISTORY:   History of Present Injury/Illness (Reason for Referral):  S/p Robotic-assisted cholecystectomy plus multiple peritoneal biopsies and 2 Shamir-Cut liver biopsies. Past Medical History/Comorbidities:   Mr. Grey Friedman  has a past medical history of Aortic valve insufficiency (1/12/2016); Bicuspid aortic valve (1/12/2016); CAD (coronary artery disease); Cataract; Chronic kidney disease, stage III (moderate) (05/17/2016); Chronic kidney failure (1/12/2016); Chronic musculoskeletal pain; Coronary artery disease involving native coronary artery without angina pectoris (5/17/2016); DDD (degenerative disc disease); Diabetes mellitus, type 2 ([facility] [address] GERD (gastroesophageal reflux disease); Gout; History of atrial fibrillation; History of basal cell carcinoma (2010); History of complete eye exam; History of dental examination (08/2016); History of kidney stones; History of MI (myocardial infarction) (1993); History of MRSA infection; Hyperlipidemia; Hypertension; Morbid obesity with BMI of 40.0-44.9, adult ([facility] [address] (1/12/2016); Murmur (01/12/2016); Prediabetes; Pure hypercholesterolemia (4/22/2016); and 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   Mr. Grey Friedman  has a past surgical history that includes hx angioplasty (7511-8432); hx malignant skin lesion excision; hx urological (1991); hx heent; hx cataract removal (Bilateral); pr cardiac surg procedure unlist; hx heart catheterization; hx back surgery (8/11/15); hx orthopaedic (Left); and hx hip replacement (Left). Social History/Living Environment:   Home Environment: Apartment  # Steps to Enter: 1  Rails to [facility]: No  One/Two Story Residence: One story  Living Alone: No  Support Systems: Spouse/Significant Other/Partner  Patient Expects to be Discharged to[de-identified] Apartment  Current DME Used/Available at Home: Cane, straight, Crutches, Shower chair  Tub or Shower Type: Tub/Shower combination  Prior Level of Function/Work/Activity:  independent   Number of Personal Factors/Comorbidities that affect the Plan of Care: 3+: HIGH COMPLEXITY   EXAMINATION:   Most Recent Physical Functioning:   Gross Assessment: right UE & both LE 3-/5                       Balance:  Sitting: Intact; Without support  Standing: Impaired; With support (walker) Bed Mobility:  Supine to Sit:  (NT)  Sit to Supine:  (NT)  Scooting: Stand-by assistance       Transfers:  Sit to Stand: Stand-by assistance  Stand to Sit: Stand-by assistance  Bed to Chair: Stand-by assistance (with walker)  Gait: 02 sat 95% pre-gait & 93% post on RA     Speed/Ghazala: Delayed  Step Length: Left shortened;Right shortened  Gait Abnormalities: Decreased step clearance  Distance (ft): 200 Feet (ft)  Assistive Device: Walker, rolling  Ambulation - Level of Assistance: Stand-by assistance  Interventions: Safety awareness training;Verbal cues  Duration: 12 Minutes      Body Structures Involved:  1. Bones  2. Joints  3. Muscles  4. Ligaments Body Functions Affected:  1. Movement Related Activities and Participation Affected:  1.  Mobility   Number of elements that affect the Plan of Care: 3: MODERATE COMPLEXITY   CLINICAL PRESENTATION:   Presentation: Stable and uncomplicated: LOW COMPLEXITY   CLINICAL DECISION MAKIN [facility] [address] [address] AM-PAC 6 Clicks   Basic Mobility Inpatient Short Form  How much difficulty does the patient currently have. .. Unable A Lot A Little None   1. Turning over in bed (including adjusting bedclothes, sheets and blankets)? [] 1   [] 2   [x] 3   [] 4   2. Sitting down on and standing up from a chair with arms ( e.g., wheelchair, bedside commode, etc.)   [] 1   [] 2   [x] 3   [] 4   3. Moving from lying on back to sitting on the side of the bed? [] 1   [] 2   [x] 3   [] 4   How much help from another person does the patient currently need. .. Total A Lot A Little None   4. Moving to and from a bed to a chair (including a wheelchair)? [] 1   [] 2   [x] 3   [] 4   5. Need to walk in hospital room? [] 1   [] 2   [x] 3   [] 4   6. Climbing 3-5 steps with a railing? [] 1   [] 2   [x] 3   [] 4   © 2007, Trustees of [address] [place] [address] [address] [address], under license to [employer]. All rights reserved      Score:  Initial: 18 Most Recent: X (Date: -- )    Interpretation of Tool:  Represents activities that are increasingly more difficult (i.e. Bed mobility, Transfers, Gait). Score 24 23 22-20 19-15 14-10 9-7 6     Modifier CH CI CJ CK CL CM CN      ? Mobility - Walking and Moving Around:     - CURRENT STATUS: CK - 40%-59% impaired, limited or restricted    - GOAL STATUS: CJ - 20%-39% impaired, limited or restricted    - D/C STATUS:  ---------------To be determined---------------  [place] / [place] / Product Type: Medicare /      Medical Necessity:     · Patient is expected to demonstrate progress in strength, range of motion and balance to decrease assistance required with theraputic exercises and functional mobility. Reason for Services/Other Comments:  · Patient continues to require present interventions due to patient's inability to perform theraputic exercises and functional mobility independently.    Use of outcome tool(s) and clinical judgement create a POC that gives a: Clear prediction of patient's progress: LOW COMPLEXITY            TREATMENT:   (In addition to Assessment/Re-Assessment sessions the following treatments were rendered)   Pre-treatment Symptoms/Complaints:  none  Pain: Initial: visual scale   Pain Intensity 1: 0  Pain Location 1: Abdomen  Pain Orientation 1: Mid  Pain Intervention(s) 1: Ambulation/Increased Activity  Post Session:  0/10     Gait Training (12 Minutes):  Gait training to improve and/or restore physical functioning as related to mobility, strength, balance, coordination and dynamic movement of leg - bilateral to improve functional gait. Ambulated 200 Feet (ft) with Stand-by assistance using a Walker, rolling and minimal Safety awareness training;Verbal cues related to their stride length and heel strike to promote proper body alignment, promote proper body posture and promote proper body mechanics. Therapeutic Exercise: (13 Minutes):  Exercises per grid below to improve mobility and dynamic movement of arm - bilateral and leg - bilateral to improve functional endurance. Required minimal verbal cues to promote proper body alignment and promote proper body mechanics. Progressed repetitions as indicated. Date:  7/21 Date:  7/22 Date:     Activity/Exercise Parameters Parameters Parameters   Ankle pumps 10 15     quad sets 10 15    Gluteal sets 10 15     hip ABD-ADD 10 15     heel slides 10 15    LAQ's 10 15     SLR's 10 1    Overhead press 10 15     UE push & pull 10 15     horizontal shoulder ABD -ADD 10 15                                  Braces/Orthotics/Lines/Etc:   · IV  Treatment/Session Assessment:    · Response to Treatment:  No SOB with exertion, informed RN to check sats from thumb, more reliable  · Interdisciplinary Collaboration:   o Registered Nurse  · After treatment position/precautions:   o Up in chair  o Bed alarm/tab alert on  o Bed/Chair-wheels locked  o Call light within reach  o RN notified   · Compliance with Program/Exercises: Will assess as treatment progresses.   · Recommendations/Intent for next treatment session: \"Next visit will focus on advancements to more challenging activities and reduction in assistance provided\".   Total Treatment Duration:  PT Patient Time In/Time Out  Time In: 0950  Time Out: [address] [place], PT

## 2018-07-23 NOTE — PROGRESS NOTES
Awoke to urinate. Up with two person asst and use of walker, to bathroom, voiding clear, gilberto urine and back to bed. Drowsy, walking slowly, following commands. Asst with repositioning to facilitate comfort in the bed. Call light in reach, bed alarm set, O2 reapplied at 2L N/C, door open. Reminded to call for asst to be out of bed, voiced understanding. No distress.

## 2018-07-23 NOTE — PROGRESS NOTES
Edgardo Szymanski Admission Date: 7/19/2018 Daily Progress Note: 7/23/2018 The patient's chart is reviewed and the patient is discussed with the staff. Patient is a 70 y.o.  male seen and evaluated at the request of Dr. Marcia Gardner. He denies any history of underlying lung disease, is a never smoker, and is not on any O2 at home. He came in yesterday for lap jose armando for abd pain and a HIDA scan suggesting biliary dyskinesia. 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 He had liver biopsies performed as well. With 10L of ascites removed post op his BP was borderline and he was on supplemental O2 so decision was made to monitor him in the ICU with expected fluid shifts to follow. Subjective: On RA, drains removed Ambulated with PT without desaturation Current Facility-Administered Medications Medication Dose Route Frequency  albuterol (PROVENTIL VENTOLIN) nebulizer solution 2.5 mg  2.5 mg Nebulization QID RT  
 allopurinol (ZYLOPRIM) tablet 300 mg  300 mg Oral QHS  aspirin delayed-release tablet 81 mg  81 mg Oral DAILY  dronedarone (MULTAQ) tablet tab 400 mg  400 mg Oral BID WITH MEALS  famotidine (PEPCID) tablet 40 mg  40 mg Oral QHS  felodipine (PLENDIL SR) 24 hr tablet 10 mg (Patient Supplied)  10 mg Oral DAILY  fenofibrate (LOFIBRA) tablet 160 mg  160 mg Oral DAILY  finasteride (PROSCAR) tablet 5 mg  5 mg Oral QHS  isosorbide mononitrate ER (IMDUR) tablet 60 mg  60 mg Oral DAILY  lisinopril-hydroCHLOROthiazide (PRINZIDE, ZESTORETIC) 20-25 mg per tablet 1 Tab  1 Tab Oral DAILY  metoprolol tartrate (LOPRESSOR) tablet 50 mg  50 mg Oral DAILY  nitroglycerin (NITROSTAT) tablet 0.4 mg  0.4 mg SubLINGual PRN  potassium chloride (KLOR-CON) tablet 10 mEq  10 mEq Oral DAILY  rosuvastatin (CRESTOR) tablet 20 mg  20 mg Oral QHS  tamsulosin (FLOMAX) capsule 0.4 mg  0.4 mg Oral DAILY  latanoprost (XALATAN) 0.005 % ophthalmic solution 1 Drop  1 Drop Both Eyes QHS  sodium chloride (NS) flush 5-10 mL  5-10 mL IntraVENous Q8H  
 sodium chloride (NS) flush 5-10 mL  5-10 mL IntraVENous PRN  
 naloxone (NARCAN) injection 0.4 mg  0.4 mg IntraVENous PRN  
 diphenhydrAMINE (BENADRYL) injection 12.5 mg  12.5 mg IntraVENous Q4H PRN  
 LORazepam (ATIVAN) injection 1 mg  1 mg IntraVENous Q6H PRN  
 enoxaparin (LOVENOX) injection 40 mg  40 mg SubCUTAneous Q24H  promethazine (PHENERGAN) with saline injection 12.5 mg  12.5 mg IntraVENous Q6H PRN  
 ondansetron (ZOFRAN) injection 4 mg  4 mg IntraVENous Q6H PRN  
 oxyCODONE-acetaminophen (PERCOCET) 5-325 mg per tablet 2 Tab  2 Tab Oral Q4H PRN  
 oxyCODONE-acetaminophen (PERCOCET) 5-325 mg per tablet 1 Tab  1 Tab Oral Q4H PRN  
 HYDROmorphone (PF) (DILAUDID) injection 0.5 mg  0.5 mg IntraVENous Q1H PRN  
 HYDROmorphone (PF) (DILAUDID) injection 1 mg  1 mg IntraVENous Q1H PRN  
 enalaprilat (VASOTEC) injection 1.25 mg  1.25 mg IntraVENous Q4H PRN  
 metoprolol (LOPRESSOR) injection 1.25 mg  1.25 mg IntraVENous Q4H PRN  
 zolpidem (AMBIEN) tablet 5 mg  5 mg Oral QHS PRN  
 lip protectant (BLISTEX) ointment   Topical PRN Review of Systems Constitutional: negative for fever, chills, sweats Cardiovascular: negative for chest pain, palpitations, syncope, edema Gastrointestinal:  negative for dysphagia, reflux, vomiting, diarrhea, abdominal pain, or melena Neurologic:  negative for focal weakness, numbness, headache Objective:  
 
Vitals:  
 07/23/18 6032 07/23/18 0421 07/23/18 3203 07/23/18 4485 BP: 136/75 129/79  144/70 Pulse: 77 82  95 Resp: 20 20  20 Temp: 97.5 °F (36.4 °C) 98.8 °F (37.1 °C)  97.4 °F (36.3 °C) SpO2: 94% 94% 90% 93% Weight:      
Height:      
 
Intake and Output:  
07/21 1901 - 07/23 0700 In: 474 [I.V.:474] [facility]: 1250 [facility] Physical Exam:  
Constitution:  the patient is well developed and in no acute distress on RA 
EENMT:  Sclera clear, pupils equal, oral mucosa moist 
Respiratory: Slightly decreased; no wheezes Cardiovascular:  RRR with 1/6 SM LSB Gastrointestinal: soft and slightly distended; with hypoactive bowel sounds, surgical wouns intact, minimal serous drainage from umbilical wound. Musculoskeletal: warm without cyanosis. There is no lower leg edema. Skin:  no jaundice or rashes Neurologic: no gross neuro deficits Psychiatric:  alert and oriented x 3 CXR:  
 
7/22: 
 
 
 
 
7/20: 
 
 
 
 
LAB No results for input(s): GLUCPOC in the last 72 hours. No lab exists for component: [place] Recent Labs  
   07/22/18 
 3402 WBC  4.7 HGB  12.7* HCT  38.1*  
PLT  95* Recent Labs  
   07/23/18 
 0603  07/22/18 
 4503 NA  145  145  
K  3.6  3.5 CL  108*  109* CO2  31  28 GLU  108*  118* BUN  22  24* CREA  1.17  1.26  
CA  9.1  8.7 No results for input(s): PH, PCO2, PO2, HCO3 in the last 72 hours. No results for input(s): LCAD, LAC in the last 72 hours. Peritoneal fluid: atypical cells. Assessment:  (Medical Decision Making) Patient Active Problem List  
Diagnosis Code  CAD (coronary artery disease) I25.10  Prediabetes R73.03  
 Atrial Fibrillation, Paroxysmal (HCC) I48.91  
 Obesity E66.9  Pure hypercholesterolemia E78.00  Essential hypertension with goal blood pressure less than 140/90 I10  Chronic kidney disease, stage III (moderate) N18.3  Benign prostatic hyperplasia N40.0  Severe obesity (BMI 35.0-39. 9) with comorbidity ([address] E66.01  
 Anticoagulant long-term use Z79.01  
 Cholecystitis with cholelithiasis K80.10  Hypoxia R09.02  
 S/P laparoscopic cholecystectomy Z90.49  Ascites R18.8  Peritoneal carcinomatosis (HCC) C78.6, C80.1  Thrombocytopenia ([address] D69.6  Respiratory failure, post-operative ([address] N65.520  Atelectasis J98.11  
 Acute renal failure superimposed on chronic kidney disease (HCC) N17.9, N18.9 Plan:  (Medical Decision Making) Hospital Problems  Date Reviewed: 7/19/2018 Codes Class Noted POA Atelectasis ICD-10-CM: J98.11 ICD-9-CM: 518.0  7/21/2018 Unknown Likely from prior ascites. Better on CXR. Continue to mobilize, now on RA Acute renal failure superimposed on chronic kidney disease ([facility] ICD-10-CM: N17.9, N18.9 ICD-9-CM: 584.9, 585.9  7/21/2018 Unknown Cr improved further. S/P laparoscopic cholecystectomy ICD-10-CM: Z90.49 ICD-9-CM: V45.89  7/20/2018 Unknown Ascites ICD-10-CM: R18.8 ICD-9-CM: 789.59  7/20/2018 Unknown From peritoneal cancer. managed by surgery. Peritoneal carcinomatosis ([facility] ICD-10-CM: C78.6, C80.1 ICD-9-CM: 197.6, 199.1  7/20/2018 Unknown Preliminary Dx adenocarcinoma. Oncology was to be consulted as OP per pt. Thrombocytopenia ([facility] ICD-10-CM: D69.6 ICD-9-CM: 287.5  7/20/2018 Unknown Ongoing Respiratory failure, post-operative ([facility] ICD-10-CM: W74.345 ICD-9-CM: 518.51  7/20/2018 Unknown Remains on oxygen. * (Principal)Cholecystitis with cholelithiasis (Chronic) ICD-10-CM: K80.10 ICD-9-CM: 574.10  7/19/2018 Yes Hypoxia ICD-10-CM: R09.02 
ICD-9-CM: 799.02  7/19/2018 Unknown  
 resolved Nothing to add will sign off- reconsult as needed. More than 50% of the time documented was spent in face-to-face contact with the patient and in the care of the patient on the floor/unit where the patient is located.  
 
Baron Corey MD

## 2018-07-23 NOTE — PROGRESS NOTES
Problem: Mobility Impaired (Adult and Pediatric)  Goal: *Acute Goals and Plan of Care (Insert Text)    GOALS MODIFIED 7/22/18 :  (1.)Mr. Heri Wasserman will move from supine to sit and sit to supine  in bed with STAND BY ASSIST (consistently). (2.)Mr. Heri Wasserman will transfer from bed to chair and chair to bed with STAND BY ASSIST (consistently) using a walker  (3.)Mr. Heri Wasserman will ambulate with STAND BY ASSIST (consistently) for 200 feet with a rolling walker  (4)Mr. Heri Wasserman will perform HEP with written guidelines 3-4 x a day. *RE-ASSESS BY 7/29/18*  ________________________________________________________________________________________________       PHYSICAL THERAPY: Daily Note, Treatment Day: 3rd, AM 7/23/2018  INPATIENT: Hospital Day: 5  [place] / [place] / Product Type: Medicare /      NAME/AGE/GENDER: Isabel Warner is a 70 y.o. male   PRIMARY DIAGNOSIS: Gallstones [K80.20] Cholecystitis with cholelithiasis Cholecystitis with cholelithiasis  Procedure(s) (LRB):  CHOLECYSTECTOMY ROBOTIC ASSISTED (N/A)  4 Days Post-Op  ICD-10: Treatment Diagnosis:    · Generalized Muscle Weakness (M62.81)  · Other abnormalities of gait and mobility (R26.89)   Precaution/Allergies:  Potassium      ASSESSMENT:     Mr. Heri Wasserman showed good endurance not dropping 02 sat with activity. Began standing balance activity, needing to challenge pt to start weaning off walker. Still reminding pt to do incentive spirometer hourly & HEP 3 x a day & need to increase reps on HEP during follow up. Would like pt to function consistently at current level before stating goals met. This section established at most recent assessment   PROBLEM LIST (Impairments causing functional limitations):  1. Decreased Strength  2. Decreased ADL/Functional Activities  3. Decreased Transfer Abilities  4. Decreased Ambulation Ability/Technique  5. Decreased Balance  6. Increased Pain  7.  Decreased Activity Tolerance  8. Increased Fatigue  9. Decreased Flexibility/Joint Mobility  10. Decreased Bimble with Home Exercise Program   INTERVENTIONS PLANNED: (Benefits and precautions of physical therapy have been discussed with the patient.)  1. Balance Exercise  2. Bed Mobility  3. Family Education  4. Gait Training  5. Home Exercise Program (HEP)  6. Range of Motion (ROM)  7. Therapeutic Activites  8. Therapeutic Exercise/Strengthening  9. Transfer Training     TREATMENT PLAN: Frequency/Duration: daily for duration of hospital stay  Rehabilitation Potential For Stated Goals: Good     RECOMMENDED REHABILITATION/EQUIPMENT: (at time of discharge pending progress): Due to the probability of continued deficits (see above) this patient will likely need continued skilled physical therapy after discharge. Equipment:    to borrow walker              HISTORY:   History of Present Injury/Illness (Reason for Referral):  S/p Robotic-assisted cholecystectomy plus multiple peritoneal biopsies and 2 Shamir-Cut liver biopsies. Past Medical History/Comorbidities:   Mr. Toya Arredondo  has a past medical history of Aortic valve insufficiency (1/12/2016); Bicuspid aortic valve (1/12/2016); CAD (coronary artery disease); Cataract; Chronic kidney disease, stage III (moderate) (05/17/2016); Chronic kidney failure (1/12/2016); Chronic musculoskeletal pain; Coronary artery disease involving native coronary artery without angina pectoris (5/17/2016); DDD (degenerative disc disease); Diabetes mellitus, type 2 ([facility] GERD (gastroesophageal reflux disease); Gout; History of atrial fibrillation; History of basal cell carcinoma (2010); History of complete eye exam; History of dental examination (08/2016); History of kidney stones; History of MI (myocardial infarction) (1993); History of MRSA infection; Hyperlipidemia; Hypertension; Morbid obesity with BMI of 40.0-44.9, adult ([facility] (1/12/2016); Murmur (01/12/2016);  Prediabetes; Pure hypercholesterolemia (4/22/2016); and 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 Mr. Radha Llamas  has a past surgical history that includes hx angioplasty (3570-0375); hx malignant skin lesion excision; hx urological (1991); hx heent; hx cataract removal (Bilateral); pr cardiac surg procedure unlist; hx heart catheterization; hx back surgery (8/11/15); hx orthopaedic (Left); and hx hip replacement (Left). Social History/Living Environment:   Home Environment: Apartment  # Steps to Enter: 1  Rails to [employer]: No  One/Two Story Residence: One story  Living Alone: No  Support Systems: Spouse/Significant Other/Partner  Patient Expects to be Discharged to[de-identified] Apartment  Current DME Used/Available at Home: Cane, straight, Crutches, Shower chair  Tub or Shower Type: Tub/Shower combination  Prior Level of Function/Work/Activity:  independent   Number of Personal Factors/Comorbidities that affect the Plan of Care: 3+: HIGH COMPLEXITY   EXAMINATION:   Most Recent Physical Functioning:   Gross Assessment: right UE & both LE 3-/5                       Balance:  Sitting: Intact; Without support  Standing: Impaired; With support (walker) Bed Mobility:  Supine to Sit: Supervision  Sit to Supine: Supervision  Scooting: Supervision       Transfers:  Sit to Stand: Stand-by assistance  Stand to Sit: Stand-by assistance  Bed to Chair: Stand-by assistance (with walker)  Gait: 02 sat 95% pre-gait & 93% post on RA, RN made aware     Speed/Ghazala: Delayed  Step Length: Left shortened;Right shortened  Gait Abnormalities: Decreased step clearance  Distance (ft): 250 Feet (ft)  Assistive Device: Walker, rolling  Interventions: Safety awareness training;Verbal cues  Duration: 13 Minutes      Body Structures Involved:  1. Bones  2. Joints  3. Muscles  4. Ligaments Body Functions Affected:  1. Movement Related Activities and Participation Affected:  1.  Mobility   Number of elements that affect the Plan of Care: 3: MODERATE COMPLEXITY   CLINICAL PRESENTATION: Presentation: Stable and uncomplicated: LOW COMPLEXITY   CLINICAL DECISION MAKIN[address] [place] [id number] AM-PAC 6 Clicks   Basic Mobility Inpatient Short Form  How much difficulty does the patient currently have. .. Unable A Lot A Little None   1. Turning over in bed (including adjusting bedclothes, sheets and blankets)? [] 1   [] 2   [x] 3   [] 4   2. Sitting down on and standing up from a chair with arms ( e.g., wheelchair, bedside commode, etc.)   [] 1   [] 2   [x] 3   [] 4   3. Moving from lying on back to sitting on the side of the bed? [] 1   [] 2   [x] 3   [] 4   How much help from another person does the patient currently need. .. Total A Lot A Little None   4. Moving to and from a bed to a chair (including a wheelchair)? [] 1   [] 2   [x] 3   [] 4   5. Need to walk in hospital room? [] 1   [] 2   [x] 3   [] 4   6. Climbing 3-5 steps with a railing? [] 1   [] 2   [x] 3   [] 4   © , Trustees of [address] [place] [id number], under license to [employer] All rights reserved      Score:  Initial: 18 Most Recent: X (Date: -- )    Interpretation of Tool:  Represents activities that are increasingly more difficult (i.e. Bed mobility, Transfers, Gait). Score 24 23 22-20 19-15 14-10 9-7 6     Modifier CH CI CJ CK CL CM CN      ? Mobility - Walking and Moving Around:     - CURRENT STATUS: CK - 40%-59% impaired, limited or restricted    - GOAL STATUS: CJ - 20%-39% impaired, limited or restricted    - D/C STATUS:  ---------------To be determined---------------  [place] / [place] / Product Type: Medicare /      Medical Necessity:     · Patient is expected to demonstrate progress in strength, range of motion and balance to decrease assistance required with theraputic exercises and functional mobility.   Reason for Services/Other Comments:  · Patient continues to require present interventions due to patient's inability to perform theraputic exercises and functional mobility independently. Use of outcome tool(s) and clinical judgement create a POC that gives a: Clear prediction of patient's progress: LOW COMPLEXITY            TREATMENT:   (In addition to Assessment/Re-Assessment sessions the following treatments were rendered)   Pre-treatment Symptoms/Complaints:  Pt eager to work with pt  Pain: Initial: visual scale   Pain Intensity 1: 0  Pain Location 1: Abdomen  Pain Orientation 1: Mid  Pain Intervention(s) 1: Ambulation/Increased Activity  Post Session:  0/10     Gait Training (13 Minutes):  Gait training to improve and/or restore physical functioning as related to mobility, strength, balance, coordination and dynamic movement of leg - bilateral to improve functional gait. Ambulated 250 Feet (ft) with   using a Walker, rolling and minimal Safety awareness training;Verbal cues related to their stride length and heel strike to promote proper body alignment, promote proper body posture and promote proper body mechanics. Therapeutic Exercise: (15 Minutes):  Exercises per grid below to improve mobility and dynamic movement of arm - bilateral and leg - bilateral to improve functional endurance. Required minimal verbal cues to promote proper body alignment and promote proper body mechanics. Progressed repetitions as indicated. Also added standing diagonal wt-shift without UE support, standing trunk rotation without UE support, overhead reach in standing & challanged static standing balance against multi-directional resistance by PT.      Date:  7/21 Date:  7/22 Date:  7/23   Activity/Exercise Parameters Parameters Parameters   Ankle pumps 10 15 15    quad sets 10 15 15   Gluteal sets 10 15 15    hip ABD-ADD 10 15 15    heel slides 10 15 15   LAQ's 10 15 15    SLR's 10 15 15   Overhead press 10 15 15    UE push & pull 10 15 15    horizontal shoulder ABD -ADD 10 15 15 aa left UE                                 Braces/Orthotics/Lines/Etc:   · IV  Treatment/Session Assessment:    · Response to Treatment:  Progressing well, seems in good spirits, wife supportive  · Interdisciplinary Collaboration:   o Registered Nurse  · After treatment position/precautions:   o Up in chair  o Bed alarm/tab alert on  o Bed/Chair-wheels locked  o Caregiver at bedside  o Call light within reach  o RN notified  o Family at bedside   · Compliance with Program/Exercises: Will assess as treatment progresses. · Recommendations/Intent for next treatment session: \"Next visit will focus on advancements to more challenging activities and reduction in assistance provided\".   Total Treatment Duration:  PT Patient Time In/Time Out  Time In: 0818  Time Out: [facility],

## 2018-07-23 NOTE — PROGRESS NOTES
Shift assessment complete. Pt alert and oriented to person, disoriented to place and time. Respirations diminished, however no signs of respiratory distress on 2L 02 via NC. HR regular. Bowel sounds active in all four quadrants. Abdomen soft and distended. Abdominal puncture sites c-d-I and open to air. Pt denies pain and nausea. Bilateral trace edema to lower extremities. IV capped. Assisted pt to [facility] and recliner. Sitting up in chair conformably, no needs voiced. Recliner locked, call light within reach, gripper socks on, chair alarm on and audible at nurses station. Will continue to monitor.

## 2018-07-23 NOTE — PROGRESS NOTES
Phenergan 12.5 mg given IVP slowly for c/o nausea after ambulating from bed to recliner with asst. Chair exit alarm set, call light within reach, door open.

## 2018-07-23 NOTE — PROGRESS NOTES
Resting quietly in bed, awake with no c/o. Resp even, unlab, skin warm, dry. Abdom soft with active bowel sounds, incisions intact with staples, ostomy bag intact over umbilical incision with scant amount of clear orange drainage noted. AP 84, regular. Lungs sounds clear, diminished in bases. Assessment completed. Reminded to call for asst to be out of bed, voiced understanding. No distress.

## 2018-07-23 NOTE — PROGRESS NOTES
Back to bed with asst and use of walker, gait and stance steady. No c/o. No distress noted. Bed alarm set with pt reminded to call for asst to be out of bed, agreed.

## 2018-07-23 NOTE — PROGRESS NOTES
Visualized pt. Sitting up in recliner resting quietly. Eyes closed, respirations present, no signs of distress. Wife at bedside, call light within reach. Will continue to monitor.

## 2018-07-23 NOTE — PROGRESS NOTES
Up to bathroom with asst, voiding without difficulty and back to recliner. Aware to call for asst to be out of chair, chair exit alarm set, mikayla light within reach, door open, nausea relieved.

## 2018-07-23 NOTE — PROGRESS NOTES
[address]  [address] [facility], [facility] [address], [address]      PLAN:DC home tomorrow  MOM 30 cc PO now for constipation        ASSESSMENT:  Admit Date: 7/19/2018   4 Days Post-Op  Procedure(s):  CHOLECYSTECTOMY ROBOTIC ASSISTED    Principal Problem:    Cholecystitis with cholelithiasis (7/19/2018)    Active Problems:    Hypoxia (7/19/2018)      S/P laparoscopic cholecystectomy (7/20/2018)      Ascites (7/20/2018)      Peritoneal carcinomatosis ([address] (7/20/2018)      Thrombocytopenia ([address] (7/20/2018)      Respiratory failure, post-operative ([address] (7/20/2018)      Atelectasis (7/21/2018)      Acute renal failure superimposed on chronic kidney disease ([address] (7/21/2018)         Tiffany Poles ready for home although he is constipated. VSS afebrile. Will DC tomorrow. OBJECTIVE:  Constitutional: Alert oriented cooperative patient in no acute distress; appears stated age   Visit Vitals    /62 (BP 1 Location: Right arm, BP Patient Position: Sitting)    Pulse 74    Temp 97.5 °F (36.4 °C)    Resp 18    Ht 5' 9\" (1.753 m)    Wt 205 lb 11.2 oz (93.3 kg)    SpO2 94%    BMI 30.38 kg/m2     Eyes:Sclera are clear. ENMT: no external lesions gross hearing normal; no obvious neck masses, no ear or lip lesions  CV: RRR. Normal perfusion  Resp: No JVD. Breathing is  non-labored; no audible wheezing. GI: soft clean dry and intact    Musculoskeletal: unremarkable with normal function. No embolic signs or cyanosis.    Neuro:  Oriented; moves all 4; no focal deficits  Psychiatric: normal affect and mood, no memory impairment      Patient Vitals for the past 24 hrs:   BP Temp Pulse Resp SpO2   07/23/18 1516 - - - - 94 %   07/23/18 1435 107/62 97.5 °F (36.4 °C) 74 18 92 %   07/23/18 1143 126/67 98 °F (36.7 °C) 83 16 (!) 89 %   07/23/18 0818 144/70 97.4 °F (36.3 °C) 95 20 93 %   07/23/18 0731 - - - - 90 %   07/23/18 0421 129/79 98.8 °F (37.1 °C) 82 20 94 %   07/23/18 0054 136/75 97.5 °F (36.4 °C) 77 20 94 %   07/22/18 2051 107/58 97.5 °F (36.4 °C) 83 18 91 %   07/22/18 2015 - - - - 92 %     Labs:  Recent Labs      07/23/18   0603  07/22/18 0625   WBC   --   4.7   HGB   --   12.7*   PLT   --   95*   NA  145  145   K  3.6  3.5   CL  108*  109*   CO2  31  28   BUN  22  24*   CREA  1.17  1.26   GLU  108*  118*         Sharif Guzmán, DO

## 2018-07-23 NOTE — PROGRESS NOTES
Up to bathroom with asst and use of walker, gait and stance steady, voided without difficulty, amount unmeasured, and back to recliner. No c/o. No distress.

## 2018-07-23 NOTE — PROGRESS NOTES
Pt complains of abdominal pain 6/10. PRN Percocet 2 tabs given PO. Pt sitting up in recliner. Wheels locked, call light within reach, chair alarm on, family at bedside. Will continue to monitor.

## 2018-07-23 NOTE — PROGRESS NOTES
Resting quietly in recliner, resp even, unlab. Eyes closed with relaxed facial expression. No distress noted.

## 2018-07-23 NOTE — PROGRESS NOTES
Resting quietly in bed, resp even, unlab with O2 off. O2 reapplied at 2L N/C during bedside report given to Elis Armando RN, including pt's bed alarm set, pt fall risk. No distress noted.

## 2018-07-23 NOTE — PROGRESS NOTES
After returning to bed with asst, dozed at short intervals, then restless, unable to sleep. Ativan 1 mg given IVP slowly.

## 2018-07-24 NOTE — PROGRESS NOTES
Assessment done via doc flow sheet. Pt sitting in chair at bedside, alert & oriented x4, resp easy & regular, lungs CTA bilaterally. Abdomen soft, 5 PS with staples open to air. Denies pain at this time.  Call light within reach, pt instructed to call for assistance

## 2018-07-24 NOTE — PROGRESS NOTES
Therapist entered pt room and ask if he would like to work with therapist.  Pt stated all I want to do it boop so I can leave, but you are welcome to check on me this afternoon. Therapist said yes she would.

## 2018-07-24 NOTE — PROGRESS NOTES
Shift assessment complete. Pt alert and oriented x4, respirations diminished, even and unlabored, HOB elevated, pt denies any SOB at this time, HR regular, abd distended, tender, Active BS in all 4 quadrants, 5 PS with staples open to air, No pressure ulcers or edema noted, pt is up with assistance to the bathroom, voiding, SCDs in place and functioning, pt denies any pain at this time, pt instructed to call for assistance, pt verbalizes understanding, bed low and locked, side rails x3, call light within reach.

## 2018-07-24 NOTE — DISCHARGE SUMMARY
[facility] [address]  [place], [address] [facility]  [phone]   Discharge Summary     Isabel Warner  MRN: [id number]     : 1940     Age: 70 y.o. Admit date: 2018     Discharge date: 2018  Attending Physician: Tha Keen MD  Primary Discharge Diagnosis:   Principal Problem:    Cholecystitis with cholelithiasis (2018)    Active Problems:    Hypoxia (2018)      S/P laparoscopic cholecystectomy (2018)      Ascites (2018)      Peritoneal carcinomatosis ([address] (2018)      Thrombocytopenia ([address] (2018)      Respiratory failure, post-operative ([address] (2018)      Atelectasis (2018)      Acute renal failure superimposed on chronic kidney disease ([address] (2018)      Primary Operations or Procedures Performed :  Procedure(s):  CHOLECYSTECTOMY ROBOTIC ASSISTED     Brief History and Reason for Admission: Isabel Warner was admitted with the following history of present illness. Isabel Warner is a 70 y.o. male who presents for evaluation of gallbladder problems as a referral from Dr. Diomedes Toscano. The patient had a CCK HIDA scan done which showed:  HISTORY: Cholelithiasis; Nausea, vomiting      TECHNIQUE: The patient received 6.3 mCi technetium 99m Choletec.  Imaging of the  abdomen was performed.  At 54 minutes, 2.22 mcg cholecystokinin analog was  administered intravenously and a gallbladder ejection fraction was measured.      COMPARISON: None available      FINDINGS: Radiopharmaceutical is distributed homogeneously throughout the liver.   Gallbladder activity appears at 20 minutes.  Small bowel activity appears at 70  minutes.  The gallbladder ejection fraction was diminished, measuring 3%.  The  patient had no significant symptomatic response to cholecystokinin analog .      IMPRESSION  IMPRESSION:       1. No cystic duct obstruction or complete distal biliary obstruction.      2.  Diminished gallbladder ejection fracture, measuring 3%. This finding may be  present with biliary dyskinesia in the appropriate clinical setting.           The patient has had \"years\" of RUQ pain which is been getting worse. He has constant nausea and is very uncomfortable. He is on Plavix and Eliquis, which he stopped on his own as he wants to have surgery as soon as possible. He is having RUQ pain, nausea, occasional vomiting, pain referred to his back and diarrhea. He has known cholelithiasis based on previous studies. Hospital Course: The patient underwent a robotic assisted cholecystectomy with liver biopsy on 7/19/18 for biliary dyskenesia. 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 He had post operative complications with ascitic fluid draining from his inferior most wound which required pouching and hypoxia. He was seen by pulmonary for some atelectasis secondary to ascites. He was able to pass flatus and tolerate his diet. He was discharged on Miralax due to complaints of constipation. The course was otherwise uneventful. DIAGNOSIS   A: GALLBLADDER: MILD CHRONIC CHOLECYSTITIS; CHOLELITHIASIS. B: PERITONEAL BIOPSY #1: MODERATELY DIFFERENTIATED ADENOCARCINOMA. SEE IMMUNOHISTOCHEMICAL FINDINGS BELOW. C: PERITONEAL BIOPSY #2: MODERATELY DIFFERENTIATED ADENOCARCINOMA. SEE IMMUNOHISTOCHEMICAL BELOW. D: PERITONEAL BIOPSY #3: MODERATELY DIFFERENTIATED ADENOCARCINOMA. SEE IMMUNOHISTOCHEMICAL FINDINGS BELOW. E: LIVER BIOPSY #1: UNREMARKABLE LIVER PARENCHYMA. NO TUMOR IDENTIFIED. F: Aneudy Clapper BIOPSY #2: UNREMARKABLE LIVER PARENCHYMA. NO TUMOR IDENTIFIED. Condition at Discharge: Good    Discharge Medications:   Current Discharge Medication List      START taking these medications    Details   oxyCODONE-acetaminophen (PERCOCET) 5-325 mg per tablet Take 1 Tab by mouth every four (4) hours as needed (Mild pain. ). Max Daily Amount: 6 Tabs.   Qty: 30 Tab, Refills: 0    Associated Diagnoses: Calculus of gallbladder with acute on chronic cholecystitis without obstruction; S/P laparoscopic cholecystectomy      ondansetron hcl (ZOFRAN) 8 mg tablet Take 1 Tab by mouth every eight (8) hours as needed for Nausea. Qty: 15 Tab, Refills: 0         CONTINUE these medications which have NOT CHANGED    Details   aspirin delayed-release 81 mg tablet Take 81 mg by mouth daily. Patient instructed to take      simethicone (MYLICON) 80 mg chewable tablet Take 1 Tab by mouth every six (6) hours as needed for Flatulence. Qty: 120 Tab, Refills: 2      dronedarone (MULTAQ) tab tablet Take 1 Tab by mouth two (2) times daily (with meals). Qty: 60 Tab, Refills: 11      isosorbide mononitrate ER (IMDUR) 60 mg CR tablet Take 1 Tab by mouth every morning. Qty: 30 Tab, Refills: 11      famotidine (PEPCID) 40 mg tablet Take 1 Tab by mouth nightly. Qty: 20 Tab, Refills: 0      fenofibrate (LOFIBRA) 160 mg tablet Take 1 Tab by mouth every morning. Qty: 30 Tab, Refills: 11      rosuvastatin (CRESTOR) 20 mg tablet Take 1 Tab by mouth nightly. Qty: 30 Tab, Refills: 11      pantoprazole (PROTONIX) 40 mg tablet Take 1 Tab by mouth nightly. Qty: 30 Tab, Refills: 11      tamsulosin (FLOMAX) 0.4 mg capsule Take 1 Cap by mouth daily. Qty: 90 Cap, Refills: 3      felodipine (PLENDIL SR) 10 mg 24 hr tablet Take 1 Tab by mouth daily. Qty: 30 Tab, Refills: 11      potassium citrate (UROCIT-K 10) 10 mEq (1,080 mg) TbER Take  by mouth two (2) times a day. metoprolol tartrate (LOPRESSOR) 50 mg tablet Take 1 Tab by mouth daily. Qty: 30 Tab, Refills: 11      TRAVATAN Z 0.004 % ophthalmic solution Administer 1 Drop to both eyes nightly. Once daily at bedtime      Cholecalciferol, Vitamin D3, (VITAMIN D3) 1,000 unit cap Take 2,000 Units by mouth daily. allopurinol (ZYLOPRIM) 300 mg tablet Take 300 mg by mouth nightly. Indications: GOUT      finasteride (PROSCAR) 5 mg tablet Take 5 mg by mouth nightly.       apixaban (ELIQUIS) 5 mg tablet Take 1 Tab by mouth two (2) times a day. Qty: 60 Tab, Refills: 11      clopidogrel (PLAVIX) 75 mg tab Take 1 Tab by mouth daily. Qty: 30 Tab, Refills: 11      lisinopril-hydroCHLOROthiazide (PRINZIDE, ZESTORETIC) 20-25 mg per tablet Take 1 Tab by mouth every morning. Qty: 30 Tab, Refills: 11      nitroglycerin (NITROSTAT) 0.4 mg SL tablet 0.4 mg by SubLINGual route every five (5) minutes as needed. Take / use AM day of surgery  per anesthesia protocols if needed. Disposition: Home    Discharge Instructions/Follow-up Care:      1. Diet as tolerated except for a  low fat diet after laparoscopic cholecystectomy.     2. Showering is allowed, but no tub baths, hot tubs or swimming.     3. Drainage is common from the wounds. Change the dressings as needed. Call our office if the wounds become reddened, tender, feel warm to the touch or pus starts to drain from the wound.     4. Take prescribed pain medication as directed, usually Percocet, Norco, Ultram or Dilaudid. Take over the counter medication for minor pain.     5. Ice may be applied intermittently to the surgical site or sites.     6. Call or office, [phone], if problems arise.     7. Follow up in the office at the assigned time.     8. Resume all medications as taken per surgery, unless specifically instructed not to take certain ones.     9. No lifting more than 25 pounds until told otherwise.     10. Driving is allowed 3 days after surgery as long as you feel comfortable enough to drive and have not taken any prescription pain medication prior to driving.     11. Resume Plavix and Eliquis on 7/23/18.     Signed:  Dawson Wang NP   7/24/2018  12:09 PM

## 2018-07-25 NOTE — PROGRESS NOTES
This note will not be viewable in [address] Transition of Care Discharge Follow-up Questionnaire   Date/Time of Call:   7/25/18 220pm   What was the patient hospitalized for? Cholecystitis with cholelithiasis  laparoscopic cholecystectomy 7/20/18   Does the patient understand his/her diagnosis and/or treatment and what happened during the hospitalization? yes   Did the patient receive discharge instructions? Yes   CM Assessed Risk for Readmission:       Patient stated Risk for Readmission:      Low r/t surgical recovery      Reports being told he has cancer unsure of extent   Review any discharge instructions (see discharge instructions/AVS in [facility]). Ask patient if they understand these. Do they have any questions? Reviewed, reports understanding of instructions   Were home services ordered (nursing, PT, OT, ST, etc.)? NO   If so, has the first visit occurred? If not, why? (Assist with coordination of services if necessary.)   N/A   Was any DME ordered? NO     If so, has it been received? If not, why?  (Assist patient in obtaining DME orders &/or equipment if necessary.) N/A     Complete a review of all medications (new, continued and discontinued meds per the D/C instructions and medication tab in [facility]). Completed         Were all new prescriptions filled? If not, why?  (Assist patient in obtaining medications if necessary  escalate for CCM &/or SW if ongoing issues are verbalized by pt or anticipated)   yes     Does the patient understand the purpose and dosing instructions for all medications? (If patient has questions, provide explanation and education.)   Yes, patient stated understanding and had no questions   Does the patient have any problems in performing ADLs? (If patient is unable to perform ADLs  what is the limiting factor(s)? Do they have a support system that can assist? If no support system is present, discuss possible assistance that they may be able to obtain. Escalate for CCM/SW if ongoing issues are verbalized by pt or anticipated)   independent with all ADLs           Does the patient have all follow-up appointments scheduled? 7 day f/up with PCP?   (f/up with PCP may be w/in 14 days if patient has a f/up with their specialist w/in 7 days)    7-14 day f/up with specialist?   (or per discharge instructions)    If f/up has not been made  what actions has the care coordinator made to accomplish this? Has transportation been arranged? Yes     Tuesday July 31, 2018 10:00 AM EDT  Global Post Op with Laura [address] ([address])  [address]  [phone]      Thursday August 02, 2018  1:40 PM EDT  Extended Office Visit with Inna Maravilla MD  [address] [employer] ([facility])  [address] [place] [address]  [phone]            Yes, has reliable transportation       Any other questions or concerns expressed by the patient? No questions or concerns   Schedule next appointment with YUSEF Lynch or refer to RN Case Manager/ per the workflow guidelines. When is care coordinators next follow-up call scheduled? If referred for CCM  what RN care manager was the referral assigned? Follow up in 2 to 3 weeks with Care Coordinator. Patient was given contact information for [facility], instructed to call with questions or concerns.    RICHARD Call Completed By: Misha John, [address] [place] [address] Coordinator

## 2018-08-02 NOTE — PROGRESS NOTES
I have reviewed discharge instructions with the patient. The patient verbalized understanding. Patient left ED via Discharge Method: ambulatory to Home with wife Opportunity for questions and clarification provided. Patient given 0 scripts. To continue your aftercare when you leave the hospital, you may receive an automated call from our care team to check in on how you are doing. This is a free service and part of our promise to provide the best care and service to meet your aftercare needs.  If you have questions, or wish to unsubscribe from this service please call [phone]. Thank you for Choosing our [facility].

## 2018-08-02 NOTE — ED TRIAGE NOTES
Patient reports abdominal pain and constipation. States gallbladder surgery 2 weeks ago, where they found \"blood\" in his stomach along with cancer. Some distention noted to abdomen in triage.

## 2018-08-02 NOTE — DISCHARGE INSTRUCTIONS
Constipation: Care Instructions  Your Care Instructions    Constipation means that you have a hard time passing stools (bowel movements). People pass stools from 3 times a day to once every 3 days. What is normal for you may be different. Constipation may occur with pain in the rectum and cramping. The pain may get worse when you try to pass stools. Sometimes there are small amounts of bright red blood on toilet paper or the surface of stools. This is because of enlarged veins near the rectum (hemorrhoids). A few changes in your diet and lifestyle may help you avoid ongoing constipation. Your doctor may also prescribe medicine to help loosen your stool. Some medicines can cause constipation. These include pain medicines and antidepressants. Tell your doctor about all the medicines you take. Your doctor may want to make a medicine change to ease your symptoms. Follow-up care is a key part of your treatment and safety. Be sure to make and go to all appointments, and call your doctor if you are having problems. It's also a good idea to know your test results and keep a list of the medicines you take. How can you care for yourself at home? · Drink plenty of fluids, enough so that your urine is light yellow or clear like water. If you have kidney, heart, or liver disease and have to limit fluids, talk with your doctor before you increase the amount of fluids you drink. · Include high-fiber foods in your diet each day. These include fruits, vegetables, beans, and whole grains. · Get at least 30 minutes of exercise on most days of the week. Walking is a good choice. You also may want to do other activities, such as running, swimming, cycling, or playing tennis or team sports. · Take a fiber supplement, such as Citrucel or Metamucil, every day. Read and follow all instructions on the label. · Schedule time each day for a bowel movement. A daily routine may help.  Take your time having your bowel movement. · Support your feet with a small step stool when you sit on the toilet. This helps flex your hips and places your pelvis in a squatting position. · Your doctor may recommend an over-the-counter laxative to relieve your constipation. Examples are Milk of Magnesia and MiraLax. Read and follow all instructions on the label. Do not use laxatives on a long-term basis. When should you call for help? Call your doctor now or seek immediate medical care if:    · You have new or worse belly pain.     · You have new or worse nausea or vomiting.     · You have blood in your stools.    Watch closely for changes in your health, and be sure to contact your doctor if:    · Your constipation is getting worse.     · You do not get better as expected. Where can you learn more? Go to http://tayo-hyun.info/. Enter 21 [phone] in the search box to learn more about \"Constipation: Care Instructions. \"  Current as of: November 20, 2017  Content Version: 11.7  © 8288-4531 [employer]. Care instructions adapted under license by [employer] (which disclaims liability or warranty for this information). If you have questions about a medical condition or this instruction, always ask your healthcare professional. [address] any warranty or liability for your use of this information.

## 2018-08-02 NOTE — ED PROVIDER NOTES
HPI Comments: Patient states he has not been feeling well\" for quite a while\". 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 He had ascites as well. He was discharged a week ago and has not had a bowel movement since then. He feels like his abdomen is getting more distended and deathly feels like he needs to have a bowel movement. He denies any nausea or vomiting. His wife states that he has taken MiraLAX over the past 2 days but this has not improved his symptoms. Elements of this note were created using speech recognition software. As such, errors of speech recognition may be present. Patient is a 70 y.o. male presenting with abdominal pain. The history is provided by the patient. Abdominal Pain Associated symptoms include constipation. Pertinent negatives include no fever, no nausea and no vomiting. Past Medical History:  
Diagnosis Date  Aortic valve insufficiency 1/12/2016  Bicuspid aortic valve 1/12/2016  CAD (coronary artery disease) x13, last placed in 2009-Followed by [facility]  Cataract  Chronic kidney disease, stage III (moderate) 05/17/2016 Followed by Dr. Karlyne Fabry  Chronic kidney failure 1/12/2016  Chronic musculoskeletal pain   
 back and left leg  Coronary artery disease involving native coronary artery without angina pectoris 5/17/2016  DDD (degenerative disc disease)  Diabetes mellitus, type 2 ([address] Pre diabetes, no medication required  GERD (gastroesophageal reflux disease) Controlled with medication  Gout  History of atrial fibrillation   
 treated with eliquis and plavix and multaq  History of basal cell carcinoma 2010  
 on hand , back and lip  History of complete eye exam   
 History of dental examination 08/2016  History of kidney stones  History of MI (myocardial infarction) 48  
 History of MRSA infection  Hyperlipidemia  Hypertension   
 well controlled with medication  Morbid obesity with BMI of 40.0-44.9, adult ([facility] 1/12/2016  Murmur 01/12/2016 Echo completed 11/26/12  Prediabetes  Pure hypercholesterolemia 4/22/2016  Status post left hip replacement 8/1/2016 Past Surgical History:  
Procedure Laterality Date  CARDIAC SURG PROCEDURE UNLIST    
 13 stents , last one 5/2009, total of 7 heart caths  HX ANGIOPLASTY  [id number]  
 multiple  HX BACK SURGERY  8/11/15  
 no hardware  HX CATARACT REMOVAL Bilateral   
 HX CHOLECYSTECTOMY  HX HEART CATHETERIZATION    
 HX HEENT Dental surgery  HX HIP REPLACEMENT Left  HX MALIGNANT SKIN LESION EXCISION    
 basal cell  HX ORTHOPAEDIC Left   
 hip replacement  HX UROLOGICAL  1991  
 kidney stone extraction Family History:  
Problem Relation Age of Onset  Kidney Disease Father  Heart Disease Mother  Stroke Mother  Arthritis-osteo Brother  Heart Disease Other  Hypertension Other Social History Social History  Marital status:  Spouse name: N/A  
 Number of children: N/A  
 Years of education: N/A Occupational History  Not on file. Social History Main Topics  Smoking status: Never Smoker  Smokeless tobacco: Never Used  Alcohol use No  
 Drug use: No  
 Sexual activity: Not on file Other Topics Concern  Not on file Social History Narrative ALLERGIES: Potassium Review of Systems Constitutional: Negative for chills and fever. Gastrointestinal: Positive for abdominal pain and constipation. Negative for nausea and vomiting. All other systems reviewed and are negative. Vitals:  
 08/02/18 9569 08/02/18 0703 08/02/18 8449 BP: 144/75 Pulse:  78 90 Temp: 97.4 °F (36.3 °C) SpO2: 92% 90% 95% Weight: 107.5 kg (237 lb) Height: 5' 9\" (1.753 m) Physical Exam  
Constitutional: He is oriented to person, place, and time. He appears well-developed and well-nourished. HENT:  
Head: Normocephalic and atraumatic. Eyes: Conjunctivae are normal. Pupils are equal, round, and reactive to light. Neck: Normal range of motion. Neck supple. Abdominal: Soft. There is no tenderness. Incision sites with mild erythema anterior abdominal wall, no induration or cellulitis. Staples in place Genitourinary: Rectum normal.  
Genitourinary Comments: No stool in vault Musculoskeletal: He exhibits no edema or tenderness. Neurological: He is alert and oriented to person, place, and time. Skin: Skin is warm and dry. Psychiatric: He has a normal mood and affect. His behavior is normal.  
Nursing note and vitals reviewed. MDM Number of Diagnoses or Management Options Constipation, unspecified constipation type: new and does not require workup Essential hypertension:  
Malignant ascites: new and requires workup Diagnosis management comments: 9:32 AM patient was able to pass a large amount of stool, states he feels much better. He appears quite well and in no distress. I have paged oncology for close outpatient follow-up 12:56 PM spoke with oncology, they will call patient for follow-up appointment Amount and/or Complexity of Data Reviewed Tests in the radiology section of CPT®: ordered and reviewed Independent visualization of images, tracings, or specimens: yes Risk of Complications, Morbidity, and/or Mortality Presenting problems: moderate Diagnostic procedures: moderate Management options: moderate Patient Progress Patient progress: improved ED Course Procedures

## 2018-08-04 NOTE — ED PROVIDER NOTES
HPI Comments: patient is a 79-year-old male with a history of heart disease and atrial fibrillation who had a laparoscopic cholecystectomy a few weeks ago. During that surgery he was found to have ascites and peritoneal carcinomatosis. Over the past week he has had increased abdominal distention and is now having some shortness of breath. He was constipated and seen at the [facility] emergency department 2 days ago and had relief after an enema. Now he states he feels like his abdomen is more full once affecting his breathing. Patient is a 70 y.o. male presenting with shortness of breath. The history is provided by the patient. Shortness of Breath This is a new problem. The current episode started more than 2 days ago. The problem has been gradually worsening. Pertinent negatives include no fever, no cough, no sputum production, no syncope, no vomiting and no abdominal pain. He has had prior hospitalizations. He has had prior ED visits. He has had no prior ICU admissions. Associated medical issues include CAD. Associated medical issues do not include asthma or COPD. Past Medical History:  
Diagnosis Date  Aortic valve insufficiency 1/12/2016  Bicuspid aortic valve 1/12/2016  CAD (coronary artery disease) x13, last placed in 2009-Followed by [facility]  Cataract  Chronic kidney disease, stage III (moderate) 05/17/2016 Followed by Dr. Wil Cisneros  Chronic kidney failure 1/12/2016  Chronic musculoskeletal pain   
 back and left leg  Coronary artery disease involving native coronary artery without angina pectoris 5/17/2016  DDD (degenerative disc disease)  Diabetes mellitus, type 2 ([address] Pre diabetes, no medication required  GERD (gastroesophageal reflux disease) Controlled with medication  Gout  History of atrial fibrillation   
 treated with eliquis and plavix and multaq  History of basal cell carcinoma 2010  
 on hand , back and lip  History of complete eye exam   
 History of dental examination 08/2016  History of kidney stones  History of MI (myocardial infarction) 48  
 History of MRSA infection  Hyperlipidemia  Hypertension   
 well controlled with medication  Morbid obesity with BMI of 40.0-44.9, adult ([address] 1/12/2016  Murmur 01/12/2016 Echo completed 11/26/12  Prediabetes  Pure hypercholesterolemia 4/22/2016  Status post left hip replacement 8/1/2016 Past Surgical History:  
Procedure Laterality Date  CARDIAC SURG PROCEDURE UNLIST    
 13 stents , last one 5/2009, total of 7 heart caths  HX ANGIOPLASTY  [id number]  
 multiple  HX BACK SURGERY  8/11/15  
 no hardware  HX CATARACT REMOVAL Bilateral   
 HX CHOLECYSTECTOMY  HX HEART CATHETERIZATION    
 HX HEENT Dental surgery  HX HIP REPLACEMENT Left  HX MALIGNANT SKIN LESION EXCISION    
 basal cell  HX ORTHOPAEDIC Left   
 hip replacement  HX UROLOGICAL  1991  
 kidney stone extraction Family History:  
Problem Relation Age of Onset  Kidney Disease Father  Heart Disease Mother  Stroke Mother  Arthritis-osteo Brother  Heart Disease Other  Hypertension Other Social History Social History  Marital status:  Spouse name: N/A  
 Number of children: N/A  
 Years of education: N/A Occupational History  Not on file. Social History Main Topics  Smoking status: Never Smoker  Smokeless tobacco: Never Used  Alcohol use No  
 Drug use: No  
 Sexual activity: Not on file Other Topics Concern  Not on file Social History Narrative ALLERGIES: Potassium Review of Systems Constitutional: Negative for fever. HENT: Negative. Eyes: Negative. Respiratory: Positive for shortness of breath. Negative for cough and sputum production. Cardiovascular: Negative for syncope. Gastrointestinal: Positive for abdominal distention. Negative for abdominal pain, diarrhea, nausea and vomiting. Endocrine: Negative. Genitourinary: Negative. Musculoskeletal: Negative. Skin: Negative. Neurological: Negative. Vitals:  
 08/04/18 1539 08/04/18 1559 08/04/18 1618 BP: (!) 87/51 126/65 129/67 Pulse: 82 69 71 Resp: 16 Temp: 97.5 °F (36.4 °C) SpO2: 95% 94% 95% Weight: 102.1 kg (225 lb) Height: 5' 9\" (1.753 m) Physical Exam  
Constitutional: He appears well-developed and well-nourished. HENT:  
Head: Normocephalic and atraumatic. Cardiovascular: Normal rate, regular rhythm and intact distal pulses. Pulmonary/Chest: Effort normal and breath sounds normal. No respiratory distress. He exhibits no tenderness. Abdominal: He exhibits distension. There is no tenderness. There is no rebound. Multiple staples intact on laparoscopic surgery sites. Musculoskeletal: He exhibits no edema. Skin: There is pallor. Nursing note and vitals reviewed. MDM Number of Diagnoses or Management Options Diagnosis management comments: ecords were reviewed and he was diagnosed with peritoneal carcinomatosis after his recent surgery. He is scheduled to see the surgeons again in the office this week for staple removal and has his first appointment with oncology. 6:06 PM 
Chest x-ray is negative per my read. He is breathing comfortably and satting 98% on room air. Blood work is unremarkable. I think all of this is ascites fluid giving him increased abdominal distention this is all likely secondary to the peritoneal carcinomatosis. He has scheduled follow-up with his surgeon on Tuesday and with oncology on Friday. Amount and/or Complexity of Data Reviewed Clinical lab tests: ordered and reviewed Tests in the radiology section of CPT®: ordered and reviewed Review and summarize past medical records: yes Independent visualization of images, tracings, or specimens: yes Risk of Complications, Morbidity, and/or Mortality Presenting problems: moderate Diagnostic procedures: low Management options: low Patient Progress Patient progress: stable ED Course Voice dictation software was used during the making of this note. This software is not perfect and grammatical and other typographical errors may be present. This note has been proofread, but may still contain errors. Cynthia Pardo MD; 8/4/2018 @6:07 PM  
=================================================================== 
 
 
 
Procedures

## 2018-08-04 NOTE — ED TRIAGE NOTES
Patient states that he had his gallbladder removed on 7/19/18. States he is having abdominal swelling and this is causing him to have SOB. States he was also diagnosed with stomach cancer.

## 2018-08-04 NOTE — ED NOTES
I have reviewed discharge instructions with the patient. The patient verbalized understanding. Patient left ED via Discharge Method: ambulatory to Home with self Opportunity for questions and clarification provided. Patient given 0 scripts. To continue your aftercare when you leave the hospital, you may receive an automated call from our care team to check in on how you are doing. This is a free service and part of our promise to provide the best care and service to meet your aftercare needs.  If you have questions, or wish to unsubscribe from this service please call [phone]. Thank you for Choosing our [facility].

## 2018-08-07 PROBLEM — R10.9 ABDOMINAL PAIN: Status: ACTIVE | Noted: 2018-01-01

## 2018-08-07 PROBLEM — R06.02 SHORTNESS OF BREATH: Status: ACTIVE | Noted: 2018-01-01

## 2018-08-07 PROBLEM — K80.10 CHOLECYSTITIS WITH CHOLELITHIASIS: Chronic | Status: RESOLVED | Noted: 2018-01-01 | Resolved: 2018-01-01

## 2018-08-07 NOTE — IP AVS SNAPSHOT
[address] 
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 
[address] [facility] [address] [phone] Your Scheduled Appointments Saturday August 11, 2018 To Be Determined SN HOSPICE ASSESSMENT with Juan Roman RN  
[facility] ([address]) [facility] ([facility] [address]) Discharge Orders None A check frank indicates which time of day the medication should be taken. My Medications START taking these medications Instructions Each Dose to Equal  
 Morning Noon Evening Bedtime  
 morphine 20 mg/mL concentrated oral syringe Commonly known as:  Ashok Ramirez Notes to Patient:  Take on as needed schedule Take 0.5 mL by mouth every three (3) hours as needed for up to 5 days. Max Daily Amount: 80 mg.  
 10 mg  
    
   
   
   
  
 zolpidem 5 mg tablet Commonly known as:  AMBIEN Notes to Patient:  Take on as needed schedule Take 1 Tab by mouth nightly as needed for Sleep. Max Daily Amount: 5 mg.  
 5 mg CHANGE how you take these medications Instructions Each Dose to Equal  
 Morning Noon Evening Bedtime  
 apixaban 5 mg tablet Commonly known as:  Renita Falling What changed:  additional instructions Take 1 Tab by mouth two (2) times a day. 5 mg clopidogrel 75 mg Tab Commonly known as:  PLAVIX What changed:  additional instructions Take 1 Tab by mouth daily. 75 mg  
    
  
   
   
   
  
 metoprolol tartrate 50 mg tablet Commonly known as:  LOPRESSOR What changed:  additional instructions Take 1 Tab by mouth daily. 50 mg  
    
  
   
   
   
  
 tamsulosin 0.4 mg capsule Commonly known as:  FLOMAX What changed:  when to take this Take 1 Cap by mouth daily. 0.4 mg  
    
  
   
   
   
  
  
CONTINUE taking these medications Instructions Each Dose to Equal  
 Morning Noon Evening Bedtime  
 allopurinol 300 mg tablet Commonly known as:  Christscott Malick Take 300 mg by mouth nightly. Indications: GOUT  
 300 mg  
    
   
   
   
  
  
 aspirin delayed-release 81 mg tablet Take 81 mg by mouth daily. Patient instructed to take 81 mg  
    
  
   
   
   
  
 dronedarone Tab tablet Commonly known as:  Nancy Louis Take 1 Tab by mouth two (2) times daily (with meals). 400 mg  
    
  
   
   
  
   
  
 famotidine 40 mg tablet Commonly known as:  PEPCID Take 1 Tab by mouth nightly. 40 mg  
    
   
   
   
  
  
 felodipine 10 mg 24 hr tablet Commonly known as:  PLENDIL SR Take 1 Tab by mouth daily. 10 mg  
    
  
   
   
   
  
 fenofibrate 160 mg tablet Commonly known as:  LOFIBRA Take 1 Tab by mouth every morning. 160 mg  
    
  
   
   
   
  
 finasteride 5 mg tablet Commonly known as:  PROSCAR Take 5 mg by mouth nightly. 5 mg  
    
   
   
   
  
  
 isosorbide mononitrate ER 60 mg CR tablet Commonly known as:  IMDUR Take 1 Tab by mouth every morning. 60 mg  
    
  
   
   
   
  
 lisinopril-hydroCHLOROthiazide 20-25 mg per tablet Commonly known as:  Birder Haste Take 1 Tab by mouth every morning. 1 Tab NITROSTAT 0.4 mg SL tablet Generic drug:  nitroglycerin Notes to Patient:  As needed 0.4 mg by SubLINGual route every five (5) minutes as needed. Take / use AM day of surgery  per anesthesia protocols if needed. 0.4 mg  
    
   
   
   
  
 ondansetron 8 mg disintegrating tablet Commonly known as:  ZOFRAN ODT Notes to Patient:  Take on as needed schedule Take 1 Tab by mouth every six (6) hours as needed for Nausea (and vomiting). Indications: PREVENTION OF POST-OPERATIVE NAUSEA AND VOMITING  
 8 mg  
    
   
   
   
  
 ondansetron hcl 8 mg tablet Commonly known as:  Sam Cruz Notes to Patient:  Take on as needed schedule Take 1 Tab by mouth every eight (8) hours as needed for Nausea. 8 mg  
    
   
   
   
  
 pantoprazole 40 mg tablet Commonly known as:  PROTONIX Take 1 Tab by mouth nightly. 40 mg  
    
   
   
   
  
  
 rosuvastatin 20 mg tablet Commonly known as:  CRESTOR Take 1 Tab by mouth nightly. 20 mg  
    
   
   
   
  
  
 simethicone 80 mg chewable tablet Commonly known as:  Abdelrahman Melanie Notes to Patient:  As needed Take 1 Tab by mouth every six (6) hours as needed for Flatulence. 80 mg  
    
   
   
   
  
 TRAVATAN Z 0.004 % ophthalmic solution Generic drug:  travoprost  
   
 Administer 1 Drop to both eyes nightly. Once daily at bedtime 1 Drop UROCIT-K 10 10 mEq (1,080 mg) Paul Seymour Generic drug:  potassium citrate Take  by mouth two (2) times a day. VITAMIN D3 1,000 unit Cap Generic drug:  cholecalciferol Take 2,000 Units by mouth daily. 2000 Units STOP taking these medications   
 oxyCODONE-acetaminophen 5-325 mg per tablet Commonly known as:  PERCOCET ASK your doctor about these medications Instructions Each Dose to Equal  
 Morning Noon Evening Bedtime  
 polyethylene glycol 17 gram packet Commonly known as:  Karissa Magallon Ask about: Should I take this medication? Take 1 Packet by mouth daily for 14 days. Hold for loose stools 17 g Where to Get Your Medications Information on where to get these meds will be given to you by the nurse or doctor. ! Ask your nurse or doctor about these medications  
  morphine 20 mg/mL concentrated oral syringe  
 zolpidem 5 mg tablet Opioid Education Prescription Opioids: What You Need to Know: 
 
 
After general anesthesia or intravenous sedation, for 24 hours or while taking prescription Narcotics: · Limit your activities · Do not drive and operate hazardous machinery · Do not make important personal or business decisions · Do  not drink alcoholic beverages · If you have not urinated within 8 hours after discharge, please contact your surgeon on call. Report the following to your surgeon: 
· Excessive pain, swelling, redness or odor of or around the surgical area · Temperature over 100.5 · Nausea and vomiting lasting longer than 4 hours or if unable to take medications · Any signs of decreased circulation or nerve impairment to extremity: change in color, persistent  numbness, tingling, coldness or increase pain · Any questions What to do at Home: 
Recommended activity: Activity as tolerated, as tolerated If you experience any of the following symptoms per Hospice staff, please follow up with MD. 
 
*  Please give a list of your current medications to your Primary Care Provider. *  Please update this list whenever your medications are discontinued, doses are 
    changed, or new medications (including over-the-counter products) are added. *  Please carry medication information at all times in case of emergency situations. These are general instructions for a healthy lifestyle: No smoking/ No tobacco products/ Avoid exposure to second hand smoke Surgeon General's Warning:  Quitting smoking now greatly reduces serious risk to your health. Obesity, smoking, and sedentary lifestyle greatly increases your risk for illness A healthy diet, regular physical exercise & weight monitoring are important for maintaining a healthy lifestyle You may be retaining fluid if you have a history of heart failure or if you experience any of the following symptoms:  Weight gain of 3 pounds or more overnight or 5 pounds in a week, increased swelling in our hands or feet or shortness of breath while lying flat in bed. Please call your doctor as soon as you notice any of these symptoms; do not wait until your next office visit. Recognize signs and symptoms of STROKE: 
 
F-face looks uneven A-arms unable to move or move unevenly S-speech slurred or non-existent T-time-call 911 as soon as signs and symptoms begin-DO NOT go Back to bed or wait to see if you get better-TIME IS BRAIN. Warning Signs of HEART ATTACK Call 911 if you have these symptoms: 
? Chest discomfort. Most heart attacks involve discomfort in the center of the chest that lasts more than a few minutes, or that goes away and comes back. It can feel like uncomfortable pressure, squeezing, fullness, or pain. ? Discomfort in other areas of the upper body. Symptoms can include pain or discomfort in one or both arms, the back, neck, jaw, or stomach. ? Shortness of breath with or without chest discomfort. ? Other signs may include breaking out in a cold sweat, nausea, or lightheadedness. Don't wait more than five minutes to call [address]! Fast action can save your life.  Calling 911 is almost always the fastest way to get lifesaving treatment. Emergency Medical Services staff can begin treatment when they arrive  up to an hour sooner than if someone gets to the hospital by car. The discharge information has been reviewed with the patient. The patient verbalized understanding. Discharge medications reviewed with the patient and appropriate educational materials and side effects teaching were provided. ___________________________________________________________________________________________________________________________________ Learning About Hospice and Palliative Care What are hospice and palliative care? Palliative (say \"PAL-bryon-uh-tiv\") care is an area of medicine that helps give you more good days by providing care for quality-of-life issues. It includes treating symptoms like pain, nausea, or sleep problems. It can also include helping you and your loved ones to: · Understand your illness better. · Talk more openly about your feelings. · Decide what treatments you want or don't want. · Communicate better with your doctors, nurses, and each other. Hospice care is a type of palliative care. But it's for people who are near the end of life. What kinds of care are involved? Palliative care: This treatment helps you feel better physically, emotionally, and spiritually while doctors also treat your illness. Your care may include pain relief, counseling, or nutrition advice. Hospice care: Again, the goal of this type of care is to help you feel better. And it can help you get the most out of the time you have left. But you no longer get treatment to try to cure your illness. When does care happen? Palliative care: This care can happen at any time during a serious illness. You don't have to be near death to get this care. Hospice care: In most cases, you can choose hospice care when your doctor believes that you have no more than about 6 months to live. Where does the care happen? Palliative care: This care often happens in hospitals or long-term care facilities like nursing homes. It can take place wherever you are treated, even in your home. Hospice care: Most hospice care is done in the place the patient calls \"home. \" This is often the person's home. But it could also be a place like a nursing home or group home center. Hospice care may also be given in hospice centers, hospitals, and other places. Who provides the care? Palliative care: There are doctors and nurses who specialize in this field. But your own doctor may also give some of this care. And there are many other experts who may help you. These include social workers, counselors, therapists, and nutrition experts. Hospice care: In hospitals, hospice centers, and other facilities, care is given by doctors, nurses, and others who are trained in hospice care. In the home, a family member is often the main caregiver. But the family member gets help from care experts. They are on call 24 hours a day. Where can you learn more? Go to http://tayo-hyun.info/. Enter [id number] in the search box to learn more about \"Learning About Hospice and Palliative Care. \" Current as of: October 6, 2017 Content Version: 11.7 © 5010-1470 [employer]. Care instructions adapted under license by [employer] (which disclaims liability or warranty for this information). If you have questions about a medical condition or this instruction, always ask your healthcare professional. [place] [address] any warranty or liability for your use of this information. ACO Transitions of Care Introducing [facility] offers a voluntary care coordination program to provide high quality service and care to [facility] fee-for-service beneficiaries.   
 
Corina Payne was designed to help you enhance your health and well-being through the following services: ? Transitions of Care  support for individuals who are transitioning from one care setting to another (example: Hospital to home). ? Chronic and Complex Care Coordination  support for individuals and caregivers of those with serious or chronic illnesses or with more than one chronic (ongoing) condition and those who take a number of different medications. If you meet specific medical criteria, a [address] [place] [address] Rd may call you directly to coordinate your care with your primary care physician and your other care providers. For questions about the [facility] programs, please, contact your physicians office. For general questions or additional information about Accountable Care Organizations: 
Please visit www.medicare.gov/acos. html or call 1-800-MEDICARE (7-320.754.3633) TTY users should call 5-312.329.3286. sli.do Announcement We are excited to announce that we are making your provider's discharge notes available to you in sli.do. You will see these notes when they are completed and signed by the physician that discharged you from your recent hospital stay. If you have any questions or concerns about any information you see in sli.do, please call the Health Information Department where you were seen or reach out to your Primary Care Provider for more information about your plan of care. Introducing [facility]! Dear Tiffanie List: Thank you for requesting a sli.do account. Our records indicate that you already have an active sli.do account. You can access your account anytime at [email or web address] Did you know that you can access your hospital and ER discharge instructions at any time in sli.do? You can also review all of your test results from your hospital stay or ER visit. Additional Information If you have questions, please visit the Frequently Asked Questions section of the Pixelated website at https://"[employer]t. FIZZA. Constant Therapy/mychart/. Remember, Pixelated is NOT to be used for urgent needs. For medical emergencies, dial 911. Now available from your iPhone and Android! Introducing Primo Gagnon As a [facility] Fisher patient, I wanted to make you aware of our electronic visit tool called Primo Gagnon. [facility] 24/7 allows you to connect within minutes with a medical provider 24 hours a day, seven days a week via a mobile device or tablet or logging into a secure website from your computer. You can access Primo Gagnon from anywhere in the [place]. A virtual visit might be right for you when you have a simple condition and feel like you just dont want to get out of bed, or cant get away from work for an appointment, when your regular Caro Fisher provider is not available (evenings, weekends or holidays), or when youre out of town and need minor care. Electronic visits cost only $49 and if the Caro Phillip 24/7 provider determines a prescription is needed to treat your condition, one can be electronically transmitted to a nearby pharmacy*. Please take a moment to enroll today if you have not already done so. The enrollment process is free and takes just a few minutes. To enroll, please download the Caro Fisher 24/[employer] yaklein to your tablet or phone, or visit [email or web address] to enroll on your computer. And, as an [address] [place] [address] patient with a [employer] account, the results of your visits will be scanned into your electronic medical record and your primary care provider will be able to view the scanned results. We urge you to continue to see your regular Caro Fisher provider for your ongoing medical care. And while your primary care provider may not be the one available when you seek a Primo Gagnon virtual visit, the peace of mind you get from getting a real diagnosis real time can be priceless. For more information on Primo Gagnon, view our Frequently Asked Questions (FAQs) at [email or web address]. Sincerely, 
 
Michelle Lainez MD 
Chief Medical Officer Rocio Tolliver *:  certain medications cannot be prescribed via Primo Gagnon Providers Seen During Your Hospitalization Provider Specialty Primary office phone Laura Guidry, [address] [place] [address] Surgery [phone] Your Primary Care Physician (PCP) Primary Care Physician Office Phone Office Fax Gini Calderon [phone] You are allergic to the following Allergen Reactions Potassium Nausea Only Recent Documentation Weight BMI Smoking Status 95.9 kg 31.23 kg/m2 Never Smoker Emergency Contacts Name Discharge Info Relation Home Work Mobile Annetta Mosley DISCHARGE CAREGIVER [3] Spouse [3] [phone] Patient Belongings The following personal items are in your possession at time of discharge: 
  Dental Appliances: None  Visual Aid: Glasses      Home Medications: None   Jewelry: None  Clothing: With patient, Undergarments, Socks, Shirt, Pants, Pajamas, Footwear    Other Valuables: At bedside Please provide this summary of care documentation to your next provider. Signatures-by signing, you are acknowledging that this After Visit Summary has been reviewed with you and you have received a copy. Patient Signature:  ____________________________________________________________ Date:  ____________________________________________________________  
  
Yeny Ashley Provider Signature:  ____________________________________________________________ Date:  ____________________________________________________________

## 2018-08-07 NOTE — PROGRESS NOTES
Patient arrived to floor. Accompany by wife. Alert and oriented x4. Reports abdominal pain and requesting pain meds and food.  Sushil Martinez NP notified of patient's arrival.

## 2018-08-07 NOTE — PROGRESS NOTES
08/07/18 1530   Dual Skin Pressure Injury Assessment   Dual Skin Pressure Injury Assessment WDL  (lap sites scars c/d/i)   Second Care Provider (Based on [address] [place] [address]) Radha Roche, RN

## 2018-08-07 NOTE — H&P
H&P/Consult Note/Progress Note/Office Note:   Geeta Segundo  MRN: [id number]  RBE:[id number]/0/6164  Age:78 y.o.    HPI: Geeta Segundo is a 70 y.o. male who is s/p lap jose armando with a diagnosis of peritoneal carcinomatosis. He reported to the office today for a routine follow up visit. The patient reports nausea, vomiting and is having a hard time eating. He reports a 25lb weight loss over a few month time period. He also reports he is short of breath due to his distended abdomen. He reports he has stopped all prescription medications and expresses a will to begin palliative care.        Past Medical History:   Diagnosis Date    Aortic valve insufficiency 1/12/2016    Bicuspid aortic valve 1/12/2016    CAD (coronary artery disease)     x13, last placed in 2009-Followed by [facility]     Cataract     Chronic kidney disease, stage III (moderate) 05/17/2016    Followed by Dr. Onelia Barclay Chronic kidney failure 1/12/2016    Chronic musculoskeletal pain     back and left leg    Coronary artery disease involving native coronary artery without angina pectoris 5/17/2016    DDD (degenerative disc disease)     Diabetes mellitus, type 2 ([address]     Pre diabetes, no medication required    GERD (gastroesophageal reflux disease)     Controlled with medication     Gout     History of atrial fibrillation     treated with eliquis and plavix and multaq    History of basal cell carcinoma 2010    on hand , back and lip    History of complete eye exam     History of dental examination 08/2016    History of kidney stones     History of MI (myocardial infarction) 1993    History of MRSA infection     Hyperlipidemia     Hypertension     well controlled with medication    Morbid obesity with BMI of 40.0-44.9, adult ([facility] 1/12/2016    Murmur 01/12/2016    Echo completed 11/26/12    Prediabetes     Pure hypercholesterolemia 4/22/2016    Status post left hip replacement 8/1/2016     Past Surgical History:   Procedure Laterality Date    CARDIAC SURG PROCEDURE UNLIST      13 stents , last one 5/2009, total of 7 heart caths    HX ANGIOPLASTY  2956-7150    multiple    HX BACK SURGERY  8/11/15    no hardware    HX CATARACT REMOVAL Bilateral     HX CHOLECYSTECTOMY      HX HEART CATHETERIZATION      HX HEENT      Dental surgery    HX HIP REPLACEMENT Left     HX MALIGNANT SKIN LESION EXCISION      basal cell     HX ORTHOPAEDIC Left     hip replacement    HX UROLOGICAL  1991    kidney stone extraction     Current Facility-Administered Medications   Medication Dose Route Frequency    sodium chloride (NS) flush 5-10 mL  5-10 mL IntraVENous Q8H    sodium chloride (NS) flush 5-10 mL  5-10 mL IntraVENous PRN    oxyCODONE-acetaminophen (PERCOCET) 5-325 mg per tablet 1 Tab  1 Tab Oral Q4H PRN    HYDROmorphone (PF) (DILAUDID) injection 0.5 mg  0.5 mg IntraVENous Q3H PRN    naloxone (NARCAN) injection 0.4 mg  0.4 mg IntraVENous PRN    ondansetron (ZOFRAN) injection 4 mg  4 mg IntraVENous Q4H PRN     Potassium  Social History     Social History    Marital status:      Spouse name: N/A    Number of children: N/A    Years of education: N/A     Social History Main Topics    Smoking status: Never Smoker    Smokeless tobacco: Never Used    Alcohol use No    Drug use: No    Sexual activity: Not on file     Other Topics Concern    Not on file     Social History Narrative     History   Smoking Status    Never Smoker   Smokeless Tobacco    Never Used     Family History   Problem Relation Age of Onset    Kidney Disease Father     Heart Disease Mother     Stroke Mother     Arthritis-osteo Brother     Heart Disease Other     Hypertension Other      ROS: The patient has no difficulty with chest pain or shortness of breath. No fever or chills. Comprehensive review of systems was otherwise unremarkable except as noted above.     Physical Exam:   Visit Vitals    /70    Pulse 88    Temp 96.6 °F (35.9 °C)    Resp 16    SpO2 94%     Constitutional: Alert, oriented, cooperative patient in no acute distress; appears stated age    Eyes:Sclera are clear. EOMs intact  ENMT: no external lesions gross hearing normal; no obvious neck masses, no ear or lip lesions, nares normal  CV: RRR. Normal perfusion  Resp: No JVD. Breathing is  non-labored; no audible wheezing. GI: soft and moderately distended, diffuse ttp, active BS x4, well healed lap scars, some redness at LLQ scar     Musculoskeletal: unremarkable with normal function. No embolic signs or cyanosis. Neuro:  Oriented; moves all 4; no focal deficits  Psychiatric: normal affect and mood, tearful at times, no memory impairment    Recent vitals (if inpt):  Patient Vitals for the past 24 hrs:   BP Temp Pulse Resp SpO2   08/07/18 1510 118/70 96.6 °F (35.9 °C) 88 16 94 %       Labs:  No results for input(s): WBC, HGB, PLT, NA, K, CL, CO2, BUN, CREA, GLU, PTP, INR, APTT, TBIL, TBILI, CBIL, SGOT, GPT, ALT, AP, AML, LPSE, LCAD, NH4, TROPT, TROIQ, PCO2, PO2, HCO3, HGBEXT, PLTEXT, HGBEXT, PLTEXT in the last 72 hours. No lab exists for component:  PH, INREXT, INREXT    Lab Results   Component Value Date/Time    WBC 9.2 08/04/2018 03:40 PM    HGB 14.6 08/04/2018 03:40 PM    PLATELET 060 02/50/0716 03:40 PM    Sodium 143 08/04/2018 03:40 PM    Potassium 4.0 08/04/2018 03:40 PM    Chloride 104 08/04/2018 03:40 PM    CO2 27 08/04/2018 03:40 PM    BUN 34 (H) 08/04/2018 03:40 PM    Creatinine 1.61 (H) 08/04/2018 03:40 PM    Glucose 106 (H) 08/04/2018 03:40 PM    INR 1.1 07/28/2016 10:45 AM    aPTT 28.3 07/28/2016 10:45 AM    Bilirubin, total 1.1 08/04/2018 03:40 PM    AST (SGOT) 73 (H) 08/04/2018 03:40 PM    ALT (SGPT) 52 08/04/2018 03:40 PM    Alk. phosphatase 114 08/04/2018 03:40 PM    Lipase 307 05/05/2018 11:17 PM    Troponin-I, Qt. <0.02 (L) 05/05/2018 11:17 PM       I reviewed recent labs and recent radiologic studies.     I independently reviewed radiology images for studies I described above or studies I have ordered. Admission date (for inpatients): 8/7/2018   * No surgery found *  * No surgery found *    ASSESSMENT/PLAN:  Problem List  Date Reviewed: 7/19/2018          Codes Class Noted    Abdominal pain ICD-10-CM: R10.9  ICD-9-CM: 789.00  8/7/2018        Atelectasis ICD-10-CM: J98.11  ICD-9-CM: 518.0  7/21/2018        Acute renal failure superimposed on chronic kidney disease ([facility] ICD-10-CM: N17.9, N18.9  ICD-9-CM: 584.9, 585.9  7/21/2018        S/P laparoscopic cholecystectomy ICD-10-CM: Z90.49  ICD-9-CM: V45.89  7/20/2018        Ascites ICD-10-CM: R18.8  ICD-9-CM: 789.59  7/20/2018        * (Principal)Peritoneal carcinomatosis ([facility] ICD-10-CM: C78.6, C80.1  ICD-9-CM: 197.6, 199.1  7/20/2018        Thrombocytopenia (HCC) ICD-10-CM: D69.6  ICD-9-CM: 287.5  7/20/2018        Respiratory failure, post-operative ([facility] ICD-10-CM: L14.240  ICD-9-CM: 518.51  7/20/2018        Cholecystitis with cholelithiasis (Chronic) ICD-10-CM: K80.10  ICD-9-CM: 574.10  7/19/2018        Hypoxia ICD-10-CM: R09.02  ICD-9-CM: 799.02  7/19/2018        Anticoagulant long-term use ICD-10-CM: Z79.01  ICD-9-CM: V58.61  5/29/2018        Severe obesity (BMI 35.0-39. 9) with comorbidity ([facility] ICD-10-CM: E66.01  ICD-9-CM: 278.01  4/3/2018        Benign prostatic hyperplasia ICD-10-CM: N40.0  ICD-9-CM: 600.00  8/9/2016        Chronic kidney disease, stage III (moderate) ICD-10-CM: N18.3  ICD-9-CM: 585.3  5/17/2016        Pure hypercholesterolemia ICD-10-CM: E78.00  ICD-9-CM: 272.0  4/22/2016        Essential hypertension with goal blood pressure less than 140/90 ICD-10-CM: I10  ICD-9-CM: 401.9  4/22/2016        Atrial Fibrillation, Paroxysmal (HCC) ICD-10-CM: I48.91  ICD-9-CM: 427.31  1/12/2016        Obesity ICD-10-CM: E66.9  ICD-9-CM: 278.00  1/12/2016        Prediabetes ICD-10-CM: R73.03  ICD-9-CM: 790.29  10/22/2015        CAD (coronary artery disease) ICD-10-CM: I25.10  ICD-9-CM: 414.00  Unknown Principal Problem:    Peritoneal carcinomatosis ([facility] (7/20/2018)    Active Problems:    Ascites (7/20/2018)      Abdominal pain (8/7/2018)    Patient with newly diagnosed peritoneal carcinomatosis. He has not been seen by oncology and expresses a will to deny treatment. Will ask oncology to see him so he is informed of any options. Will also ask palliative care to get involved. IR has been consulted for a paracentesis to ease his breathing. 1. Admit to Dr. Madeleine Le  2. Regular diet  3. Pain control  4. Appreciate specialists help  5.  Supportive care    Signed:  Trena Jarrett NP

## 2018-08-07 NOTE — PROGRESS NOTES
made initial visit. Pt was alert and verbal.  No pain level was expressed or observed.  welcomed the pt to [facility] and shared information about  services.  provided spiritual care through presence, pastoral conversation, and assurance of prayer.

## 2018-08-08 NOTE — PROGRESS NOTES
Problem: Falls - Risk of  Goal: *Absence of Falls  Document [place] Fall Risk and appropriate interventions in the flowsheet. Outcome: Progressing Towards Goal  Fall Risk Interventions:  Mobility Interventions: Communicate number of staff needed for ambulation/transfer, OT consult for ADLs, Patient to call before getting OOB, PT Consult for mobility concerns, PT Consult for assist device competence         Medication Interventions: Patient to call before getting OOB, Teach patient to arise slowly    Elimination Interventions: Call light in reach, Patient to call for help with toileting needs, Toilet paper/wipes in reach, Urinal in reach             Problem: Pressure Injury - Risk of  Goal: *Prevention of pressure injury  Document [place] Scale and appropriate interventions in the flowsheet. Outcome: Progressing Towards Goal  Pressure Injury Interventions:             Activity Interventions: Increase time out of bed, Pressure redistribution bed/mattress(bed type)         Nutrition Interventions: Document food/fluid/supplement intake

## 2018-08-08 NOTE — PROGRESS NOTES
Problem: Dysphagia (Adult)  Goal: *Acute Goals and Plan of Care (Insert Text)      Speech language pathology: bedside swallow note: Initial Assessment and Discharge    NAME/AGE/GENDER: Dwight Cousin is a 70 y.o. male  DATE: 8/8/2018  PRIMARY DIAGNOSIS: stomach cancer  Abdominal pain       ICD-10: Treatment Diagnosis: dysphagia, pharyngoesophageal 13.14  INTERDISCIPLINARY COLLABORATION: NP  PRECAUTIONS/ALLERGIES: Potassium  ASSESSMENT:Based on the objective data described below, Mr. Rajat Sheldon presents with probable esophageal dysphagia. Pt reported he has been having difficulties swallowing for the past month as po gets stuck mid chest.  Therefore, he has been on a liquid diet. He reported he was given crushed meds and they got stuck. Pt reported he is well aware of his situation and is aware he is \"going to die soon\". He reported he was given 3-4 weeks to live. He reported he did not want treatment but Dr. Pieter Hernandez came and talked to him earlier this date. He reported he doesn't know if he has the heart or energy to participate in treatment. His wife said if it will improve him he will do it. Informed him Bill Wagner NP is planning to speak with them once SLP has finished the evaluation. Pt only consumed 1 trial thin liquids and reported it got stuck. No signs/sx aspiration observed. He declined other consistencies. Noted GI consult is pending. Feel dysphagia is esophageal in nature. Will DC at this time. Discussed results with Bill Wagner NP.      PLAN OF CARE:   Patient will benefit from skilled intervention to address the following impairments.   RECOMMENDATIONS AND PLANNED INTERVENTIONS (Benefits and precautions of therapy have been discussed with the patient.):  · Liquids:  regular thin  MEDICATIONS:  · Crushed in puree  COMPENSATORY STRATEGIES/MODIFICATIONS INCLUDING:  · None  OTHER RECOMMENDATIONS (including follow up treatment recommendations):   · po trials  RECOMMENDED DIET MODIFICATIONS DISCUSSED WITH:  · NP  · Patient  FREQUENCY/DURATION: Continue to follow patient 2 times a week for duration of hospital stay to address above goals. RECOMMENDED REHABILITATION/EQUIPMENT: (at time of discharge pending progress): Due to the probability of continued deficits (see above) this patient will not likely need continued skilled speech therapy after discharge. SUBJECTIVE:   Pt cooperative. Pt's spouse present. History of Present Injury/Illness: Mr. Gabriele Ward  has a past medical history of Aortic valve insufficiency (1/12/2016); Bicuspid aortic valve (1/12/2016); CAD (coronary artery disease); Cataract; Chronic kidney disease, stage III (moderate) (05/17/2016); Chronic kidney failure (1/12/2016); Chronic musculoskeletal pain; Coronary artery disease involving native coronary artery without angina pectoris (5/17/2016); DDD (degenerative disc disease); Diabetes mellitus, type 2 ([facility] GERD (gastroesophageal reflux disease); Gout; History of atrial fibrillation; History of basal cell carcinoma (2010); History of complete eye exam; History of dental examination (08/2016); History of kidney stones; History of MI (myocardial infarction) (1993); History of MRSA infection; Hyperlipidemia; Hypertension; Morbid obesity with BMI of 40.0-44.9, adult ([facility] (1/12/2016); Murmur (01/12/2016); Prediabetes; Pure hypercholesterolemia (4/22/2016); and 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 Moninallely Chrischandana He also  has a past surgical history that includes hx angioplasty ([id number]); hx malignant skin lesion excision; hx urological (1991); hx heent; hx cataract removal (Bilateral); pr cardiac surg procedure unlist; hx heart catheterization; hx back surgery (8/11/15); hx orthopaedic (Left); hx hip replacement (Left); and hx cholecystectomy.     Present Symptoms: foods not going down    Pain Intensity 1: 0  Pain Location 1: Abdomen, Back  Pain Orientation 1: Lower  Pain Intervention(s) 1: Medication (see MAR)  Current Medications:   No current facility-administered medications on file prior to encounter. Current Outpatient Prescriptions on File Prior to Encounter   Medication Sig Dispense Refill    ondansetron (ZOFRAN ODT) 8 mg disintegrating tablet Take 1 Tab by mouth every six (6) hours as needed for Nausea (and vomiting). Indications: PREVENTION OF POST-OPERATIVE NAUSEA AND VOMITING 30 Tab 1    oxyCODONE-acetaminophen (PERCOCET) 5-325 mg per tablet Take 1 Tab by mouth every four (4) hours as needed (Mild pain. ). Max Daily Amount: 6 Tabs. 30 Tab 0    ondansetron hcl (ZOFRAN) 8 mg tablet Take 1 Tab by mouth every eight (8) hours as needed for Nausea. 15 Tab 0    [] polyethylene glycol (MIRALAX) 17 gram packet Take 1 Packet by mouth daily for 14 days. Hold for loose stools 14 Packet 0    aspirin delayed-release 81 mg tablet Take 81 mg by mouth daily. Patient instructed to take      simethicone (MYLICON) 80 mg chewable tablet Take 1 Tab by mouth every six (6) hours as needed for Flatulence. 120 Tab 2    dronedarone (MULTAQ) tab tablet Take 1 Tab by mouth two (2) times daily (with meals). 60 Tab 11    apixaban (ELIQUIS) 5 mg tablet Take 1 Tab by mouth two (2) times a day. (Patient taking differently: Take 5 mg by mouth two (2) times a day. Patient states last dose 18) 60 Tab 11    isosorbide mononitrate ER (IMDUR) 60 mg CR tablet Take 1 Tab by mouth every morning. 30 Tab 11    clopidogrel (PLAVIX) 75 mg tab Take 1 Tab by mouth daily. (Patient taking differently: Take 75 mg by mouth daily. Patient reports last dose 18) 30 Tab 11    famotidine (PEPCID) 40 mg tablet Take 1 Tab by mouth nightly. 20 Tab 0    fenofibrate (LOFIBRA) 160 mg tablet Take 1 Tab by mouth every morning. 30 Tab 11    lisinopril-hydroCHLOROthiazide (PRINZIDE, ZESTORETIC) 20-25 mg per tablet Take 1 Tab by mouth every morning. 30 Tab 11    rosuvastatin (CRESTOR) 20 mg tablet Take 1 Tab by mouth nightly.  30 Tab 11    pantoprazole (PROTONIX) 40 mg tablet Take 1 Tab by mouth nightly. 30 Tab 11    tamsulosin (FLOMAX) 0.4 mg capsule Take 1 Cap by mouth daily. (Patient taking differently: Take 0.4 mg by mouth nightly.) 90 Cap 3    felodipine (PLENDIL SR) 10 mg 24 hr tablet Take 1 Tab by mouth daily. 30 Tab 11    potassium citrate (UROCIT-K 10) 10 mEq (1,080 mg) TbER Take  by mouth two (2) times a day.  metoprolol tartrate (LOPRESSOR) 50 mg tablet Take 1 Tab by mouth daily. (Patient taking differently: Take 50 mg by mouth daily. 1/2 tab twice a day) 30 Tab 11    TRAVATAN Z 0.004 % ophthalmic solution Administer 1 Drop to both eyes nightly. Once daily at bedtime      Cholecalciferol, Vitamin D3, (VITAMIN D3) 1,000 unit cap Take 2,000 Units by mouth daily.  nitroglycerin (NITROSTAT) 0.4 mg SL tablet 0.4 mg by SubLINGual route every five (5) minutes as needed. Take / use AM day of surgery  per anesthesia protocols if needed.  allopurinol (ZYLOPRIM) 300 mg tablet Take 300 mg by mouth nightly. Indications: GOUT      finasteride (PROSCAR) 5 mg tablet Take 5 mg by mouth nightly. Current Dietary Status:  Regular         History of reflux:    Social History/Home Situation: residing with spouse    Home Environment: Apartment  One/Two Story Residence: One story  Living Alone: No  Support Systems: Spouse/Significant Other/Partner, Child(fariha), Family member(s)  Patient Expects to be Discharged to[de-identified] Apartment  Current DME Used/Available at Home: Walker, rollator  OBJECTIVE:   Respiratory Status:        CXR Results: No consolidation. MRI/CT Results: not ordered  Oral Motor Structure/Speech:  Oral-Motor Structure/Motor Speech  Labial: No impairment  Dentition: Intact, Natural  Oral Hygiene: adequate  Lingual: No impairment    Cognitive and Communication Status:  Neurologic State: Alert  Orientation Level: Oriented X4                BEDSIDE SWALLOW EVALUATION  Oral Assessment:  Oral Assessment  Labial: No impairment  Dentition: Intact; Natural  Oral Hygiene: adequate  Lingual: No impairment  P.O. Trials:  Patient Position: upright in bed    The patient was given cup randolph of the following:   Consistency Presented: Thin liquid  How Presented: Self-fed/presented;Cup/sip    ORAL PHASE:  Bolus Acceptance: No impairment  Bolus Formation/Control: No impairment  Propulsion: No impairment     Oral Residue: None    PHARYNGEAL PHASE:  Initiation of Swallow: No impairment  Laryngeal Elevation: Functional  Aspiration Signs/Symptoms: None  Vocal Quality: No impairment                OTHER OBSERVATIONS:  Rate/bite size: WNL   Endurance:  Impaired   Comments: Tool Used: Dysphagia Outcome and Severity Scale (HEIDI)    Score Comments   Normal Diet  [] 7 With no strategies or extra time needed   Functional Swallow  [] 6 May have mild oral or pharyngeal delay       Mild Dysphagia    [] 5 Which may require one diet consistency restricted (those who demonstrate penetration which is entirely cleared on MBS would be included)   Mild-Moderate Dysphagia  [] 4 With 1-2 diet consistencies restricted       Moderate Dysphagia  [] 3 With 2 or more diet consistencies restricted       Moderately Severe Dysphagia  [] 2 With partial PO strategies (trials with ST only)       Severe Dysphagia  [] 1 With inability to tolerate any PO safely          Score:  Initial: 4 Most Recent: X (Date: -- )   Interpretation of Tool: The Dysphagia Outcome and Severity Scale (HEIDI) is a simple, easy-to-use, 7-point scale developed to systematically rate the functional severity of dysphagia based on objective assessment and make recommendations for diet level, independence level, and type of nutrition. Score 7 6 5 4 3 2 1   Modifier CH CI CJ CK CL CM CN   ?  Swallowing:     - CURRENT STATUS: CK - 40%-59% impaired, limited or restricted    - GOAL STATUS:  CK - 40%-59% impaired, limited or restricted    - D/C STATUS:  CK - 40%-59% impaired, limited or restricted  [place] / [place] / Product Type: Medicare /     TREATMENT:    (In addition to Assessment/Re-Assessment sessions the following treatments were rendered)  Assessment/Reassessment only, no treatment provided today      __________________________________________________________________________________________________  Safety:   After treatment position/precautions:  · Upright in Bed    Total Treatment Duration:  Time In: 1349  Time Out: [facility] , MELECIO, CCC-SLP

## 2018-08-08 NOTE — PROGRESS NOTES
Interventional Radiology Post Paracentesis/Thoracentesis Note    8/8/2018    Procedure(s): Ultrasound guided Therapeutic Paracentesis Performed with 8 [place] catheter total volume 6550 ml. Preliminary Findings: large maroon. Complications: None    Plan:  Observation, check labs if drawn.           Chest X-Ray:  no    Full dictated report to follow    Signed By: Roderic Dakins, RN

## 2018-08-08 NOTE — ROUTINE PROCESS
TRANSFER - OUT REPORT:    Verbal report given to [facility] on [facility]  being transferred to [facility] for routine post - op       Report consisted of patients Situation, Background, Assessment and   Recommendations(SBAR). Information from the following report(s) SBAR, Procedure Summary and MAR was reviewed with the receiving nurse. Opportunity for questions and clarification was provided.       Patient transported with:   [employer]

## 2018-08-08 NOTE — PROGRESS NOTES
General Surgery Progress Note    8/8/2018    Admit Date: 8/7/2018    Subjective:     Surgery   The patient feels better after having seven liters removed by a paracentesis this morning. He was able to eat a little bit this morning. Objective:     Visit Vitals    /73    Pulse 78    Temp 97.9 °F (36.6 °C)    Resp 18    SpO2 96%         Intake/Output Summary (Last 24 hours) at 08/08/18 1038  Last data filed at 08/08/18 0400   Gross per 24 hour   Intake              562 ml   Output               75 ml   Net              487 ml        EXAM:  ABD soft, distended, positive fluid wave, active BS'S. Wounds with decreasing cellulitis. Data Review  No results found for this or any previous visit (from the past 24 hour(s)). Hospital Problems  Date Reviewed: 7/19/2018          Codes Class Noted POA    Abdominal pain ICD-10-CM: R10.9  ICD-9-CM: 789.00  8/7/2018 Yes        Shortness of breath ICD-10-CM: R06.02  ICD-9-CM: 786.05  8/7/2018 Yes        Ascites ICD-10-CM: R18.8  ICD-9-CM: 789.59  7/20/2018 Yes        * (Principal)Peritoneal carcinomatosis (HCC) ICD-10-CM: C78.6, C80.1  ICD-9-CM: 197.6, 199.1  7/20/2018 Yes          1. Palliative Care consult  2. Oncology consult  3. Bactrim  4. Low salt diet. 5. Need to arrange hospice.   Fawad Camejo MD.

## 2018-08-08 NOTE — CONSULTS
Palliative Care    Patient: Juan Venegas MRN: [id number]  SSN: [id number]    YOB: 1940  Age: 70 y.o. Sex: male       Date of Request: 8/7/2018  Date of Consult:  8/8/2018  Reason for Consult:  goals of care  Requesting Physician: Malick Horan NP     Assessment/Plan:     Principal Diagnosis:    Nausea/Vomiting  R11.2  Additional Diagnoses:   · Debility, Unspecified  R53.81  · Frailty  R54  · Pain, abdomen  R10.9  · Counseling, Encounter for Medical Advice  Z71.9  · Encounter for Palliative Care  Z51.5    Palliative Performance Scale (PPS):  PPS: 50    Medical Decision Making:   Reviewed and summarized chart from admission to present. Discussed case with appropriate providers: tien Workman RN  Reviewed laboratory and x-ray data: CBC, CMP, CXR done prior to admission    Patient resting in bed, wife is at the bedside. Introduced the role of palliative care. Patient quickly states \"I said I would try treatment, but I won't, I don't want to. I know I don't have that much longer to live, and I'm ready to meet my Maker\". Validated patient's desires and reassured him we would honor his wishes. Discussed his wishes regarding end of life care, and patient very clear that he does not want resuscitative efforts or artificial life support. DNR order placed. Wife asks if I will facilitate hospice support at home. Patient would like to go home with hospice support, and would like to go to [facility] when his symptoms worsen and his death is closer. He is receptive to [facility] [place].    We discussed current orders and plan of care. Patient's wife would like tumor marker labs and CT scan to be complete, for clarity and for closure. Patient is agreeable to these things. Patient would also like upper GI procedure due to his difficulty with swallowing. He is unable to keep any sufficient amount of food or liquids down.   I also introduced idea of Pleur-X for ascites, as he has had recurrent ascites, 7L just removed this morning. YULIANA Orosco joined visit at this time. Reviewed our discussion. Appreciate his help with patient's concerns/requests. Patient also with some abdominal pain. With patient's inability to keep pills down, will provide Roxanol 5 or 10mg sublingual.  Will also provide Zofran ODT sublingual for nausea. Utilizing these medications will help us anticipate patient's symptom management needs at home. Will continue to follow. Will discuss findings with members of the interdisciplinary team.      Thank you for this referral.          .    Subjective:     History obtained from:  Patient, Family, Care Provider and Chart    Chief Complaint: Abdominal pain, nausea and vomiting  History of Present Illness:  Mr. Mishel Aguilar is a 71 yo male with PMH of CAD, CKD, DM, a fib, MI, HLD, HTN, GERD, and other history as listed below. He also has a recent diagnosis of peritoneal carcinomatosis- not yet seen by oncology. He was seen by Dr. Esperanza Puga for routine follow-up, and reported nausea, vomiting, and decreased PO intake. He also described shortness of breath and a distended abdomen. He was admitted for further management of shortness of breath and pain. Patient has stopped medications and desires to forgo further treatment. Paracentesis performed morning of 8/8, and almost 7L removed. Advance Directive: Durable POA on file  Code Status:  DNR            [address] of : Patient has HCPOA and living will.     Past Medical History:   Diagnosis Date    Aortic valve insufficiency 1/12/2016    Bicuspid aortic valve 1/12/2016    CAD (coronary artery disease)     x13, last placed in 2009-[facility] by [facility]     Cataract     Chronic kidney disease, stage III (moderate) 05/17/2016    Followed by Dr. Min Trujillo Chronic kidney failure 1/12/2016    Chronic musculoskeletal pain     back and left leg    Coronary artery disease involving native coronary artery without angina pectoris 5/17/2016    DDD (degenerative disc disease)     Diabetes mellitus, type 2 ([facility] [address]     Pre diabetes, no medication required    GERD (gastroesophageal reflux disease)     Controlled with medication     Gout     History of atrial fibrillation     treated with eliquis and plavix and multaq    History of basal cell carcinoma 2010    on hand , back and lip    History of complete eye exam     History of dental examination 08/2016    History of kidney stones     History of MI (myocardial infarction) 1993    History of MRSA infection     Hyperlipidemia     Hypertension     well controlled with medication    Morbid obesity with BMI of 40.0-44.9, adult ([facility] [address] 1/12/2016    Murmur 01/12/2016    Echo completed 11/26/12    Prediabetes     Pure hypercholesterolemia 4/22/2016    Status post left hip replacement 8/1/2016      Past Surgical History:   Procedure Laterality Date    CARDIAC SURG PROCEDURE UNLIST      13 stents , last one 5/2009, total of 7 heart caths    HX ANGIOPLASTY  [id number]    multiple    HX BACK SURGERY  8/11/15    no hardware    HX CATARACT REMOVAL Bilateral     HX CHOLECYSTECTOMY      HX HEART CATHETERIZATION      HX HEENT      Dental surgery    HX HIP REPLACEMENT Left     HX MALIGNANT SKIN LESION EXCISION      basal cell     HX ORTHOPAEDIC Left     hip replacement    HX UROLOGICAL  1991    kidney stone extraction     Family History   Problem Relation Age of Onset    Kidney Disease Father     Heart Disease Mother     Stroke Mother     Arthritis-osteo Brother     Heart Disease Other     Hypertension Other       Social History   Substance Use Topics    Smoking status: Never Smoker    Smokeless tobacco: Never Used    Alcohol use No     Prior to Admission medications    Medication Sig Start Date End Date Taking? Authorizing Provider   ondansetron (ZOFRAN ODT) 8 mg disintegrating tablet Take 1 Tab by mouth every six (6) hours as needed for Nausea (and vomiting). Indications: PREVENTION OF POST-OPERATIVE NAUSEA AND VOMITING 7/31/18   YULIANA Skelton   oxyCODONE-acetaminophen (PERCOCET) 5-325 mg per tablet Take 1 Tab by mouth every four (4) hours as needed (Mild pain. ). Max Daily Amount: 6 Tabs. 7/24/18   Annette Long NP   ondansetron hcl (ZOFRAN) 8 mg tablet Take 1 Tab by mouth every eight (8) hours as needed for Nausea. 7/24/18   Merril RAN Long   polyethylene glycol (MIRALAX) 17 gram packet Take 1 Packet by mouth daily for 14 days. Hold for loose stools 7/24/18 8/7/18  Annette Long NP   aspirin delayed-release 81 mg tablet Take 81 mg by mouth daily. Patient instructed to take    Historical Provider   simethicone (MYLICON) 80 mg chewable tablet Take 1 Tab by mouth every six (6) hours as needed for Flatulence. 6/20/18   Sera Hi MD   dronedarone (MULTAQ) tab tablet Take 1 Tab by mouth two (2) times daily (with meals). 5/29/18   Nida Daniel MD   apixaban (ELIQUIS) 5 mg tablet Take 1 Tab by mouth two (2) times a day. Patient taking differently: Take 5 mg by mouth two (2) times a day. Patient states last dose 7/16/18 5/29/18   Nida Daniel MD   isosorbide mononitrate ER (IMDUR) 60 mg CR tablet Take 1 Tab by mouth every morning. 5/29/18   Nida Daniel MD   clopidogrel (PLAVIX) 75 mg tab Take 1 Tab by mouth daily. Patient taking differently: Take 75 mg by mouth daily. Patient reports last dose 7/13/18 5/29/18   Nida Daniel MD   famotidine (PEPCID) 40 mg tablet Take 1 Tab by mouth nightly. 5/6/18   Frandy Mcdaniel III, MD   fenofibrate (LOFIBRA) 160 mg tablet Take 1 Tab by mouth every morning. 4/25/18   Sera Hi MD   lisinopril-hydroCHLOROthiazide (PRINZIDE, ZESTORETIC) 20-25 mg per tablet Take 1 Tab by mouth every morning. 4/25/18   Sera Hi MD   rosuvastatin (CRESTOR) 20 mg tablet Take 1 Tab by mouth nightly. 4/25/18   Sera Hi MD   pantoprazole (PROTONIX) 40 mg tablet Take 1 Tab by mouth nightly. 4/25/18   Cheryl Watkins MD   tamsulosin [facility]) 0.4 mg capsule Take 1 Cap by mouth daily. Patient taking differently: Take 0.4 mg by mouth nightly. 4/25/18   Cheryl Watkins MD   felodipine (PLENDIL SR) 10 mg 24 hr tablet Take 1 Tab by mouth daily. 4/25/18   Cheryl Watkins MD   potassium citrate (UROCIT-K 10) 10 mEq (1,080 mg) TbER Take  by mouth two (2) times a day. Historical Provider   metoprolol tartrate (LOPRESSOR) 50 mg tablet Take 1 Tab by mouth daily. Patient taking differently: Take 50 mg by mouth daily. 1/2 tab twice a day 5/17/16   Edmundo Guy MD   TRAVATAN Z 0.004 % ophthalmic solution Administer 1 Drop to both eyes nightly. Once daily at bedtime 7/16/15   Historical Provider   Cholecalciferol, Vitamin D3, (VITAMIN D3) 1,000 unit cap Take 2,000 Units by mouth daily. Historical Provider   nitroglycerin (NITROSTAT) 0.4 mg SL tablet 0.4 mg by SubLINGual route every five (5) minutes as needed. Take / use AM day of surgery  per anesthesia protocols if needed. Jorge Alberto Short MD   allopurinol (ZYLOPRIM) 300 mg tablet Take 300 mg by mouth nightly. Indications: GOUT    Jorge Alberto Short MD   finasteride (PROSCAR) 5 mg tablet Take 5 mg by mouth nightly. Jorge Alberto Short MD       Allergies   Allergen Reactions    Potassium Nausea Only        Review of Systems:  A comprehensive review of systems was negative except for:   Constitutional: Positive for fatigue. Gastrointestinal: Positive for nausea/vomting/abdominal pain. Objective:     Visit Vitals    /63    Pulse 81    Temp 97.6 °F (36.4 °C)    Resp 18    SpO2 94%        Physical Exam:    General:  Cooperative. No acute distress. Eyes:  Conjunctivae/corneas clear. Nose: Nares normal. Septum midline. Neck: Supple, symmetrical, trachea midline, no JVD   Lungs:   Clear to auscultation bilaterally, unlabored. Heart:  Regular rate and rhythm. Abdomen:   Soft, non-tender, non-distended.    Extremities: Normal, atraumatic, no cyanosis or edema. Skin: Skin color, texture, turgor normal. No rash. Neurologic: Nonfocal   Psych: Alert and oriented. Appropriately tearful at times.       Assessment:     Hospital Problems  Date Reviewed: 7/19/2018          Codes Class Noted POA    Abdominal pain ICD-10-CM: R10.9  ICD-9-CM: 789.00  8/7/2018 Yes        Shortness of breath ICD-10-CM: R06.02  ICD-9-CM: 786.05  8/7/2018 Yes        Ascites ICD-10-CM: R18.8  ICD-9-CM: 789.59  7/20/2018 Yes        * (Principal)Peritoneal carcinomatosis ([facility] [address] ICD-10-CM: C78.6, C80.1  ICD-9-CM: 197.6, 199.1  7/20/2018 Yes              Signed By: Miguel Mar NP     August 8, 2018

## 2018-08-08 NOTE — PROGRESS NOTES
Spoke briefly with . Mary Jerry and his wife in room 218 (along with Ms. Del Denver, NP with palliative care). Introduced role of case management and discharge planning. Await decisions by patient and wife based on physician (surgeon, GI, oncology) and palliative care discussions. Care Management Interventions  Plan discussed with Pt/Family/Caregiver:  Yes

## 2018-08-08 NOTE — PROGRESS NOTES
Oral contrast ordered, CT department/Mildred good  notified of patient inability to swallow. Test will be performed with IV contrast after 3hr hydration completed at [address] [place] [address].

## 2018-08-08 NOTE — CONSULTS
[facility] Hematology & Oncology        Inpatient Hematology / Oncology Consult Note    Reason for Consult:  stomach cancer  Abdominal pain  Referring Physician:  Laura Guidry MD    History of Present Illness:  Mr. Ronni Birch is a 70 y.o. male admitted on 8/7/2018 with a primary diagnosis of The encounter diagnosis was Peritoneal carcinomatosis ([facility] [address] .      His PMH includes CAD, MI, HTN, CKD3, DM2, GERD, Afib, hx basal cell carcinoma and s/p lap jose armando on 7/19. He was recently diagnosed with peritoneal carcinomatosis and was scheduled to meet with Dr. Rosa M Arroyo on 8/10. He presented with c/o nausea, vomiting, 25lbs weight loss over past few months, and shortness of breath d/t distended abdomen. He also c/o difficulty eating. States food/pills get \"stuck in my chest\". He states he hasn't taken any medications in almost a month as it causes him to vomit. He also has been on a liquid diet for the past month. 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 He had mentioned to primary team that he was not interested in treatment. But upon further discussion, he has expressed interest in pursing diagnosis and will attempt treatment. We were consulted to discuss treatment options. Review of Systems:  Constitutional +fatigue +appetite changes +weight loss. Denies fever, chills, night sweats. HEENT Denies trauma, blurry vision, hearing loss, ear pain, nosebleeds, sore throat, neck pain and ear discharge. Skin Denies lesions or rashes. Lungs +dyspnea. Denies cough, sputum production or hemoptysis. Cardiovascular Denies chest pain, palpitations, or lower extremity edema. Gastrointestinal +nausea +vomiting +difficulty eating. Denies changes in bowel habits, bloody or black stools.  Denies dysuria, frequency or hesitancy of urination. Neuro Denies headaches, visual changes or ataxia.  Denies dizziness, tingling, tremors, sensory change, speech change, focal weakness or headaches. Hematology Denies easy bruising or bleeding, denies gingival bleeding or epistaxis. Endo +DM2. Denies heat/cold intolerance, denies thyroid abnormalities. MSK Denies back pain, arthralgias, myalgias or frequent falls. Psychiatric/Behavioral Denies depression and substance abuse. The patient is not nervous/anxious.          Allergies   Allergen Reactions    Potassium Nausea Only     Past Medical History:   Diagnosis Date    Aortic valve insufficiency 1/12/2016    Bicuspid aortic valve 1/12/2016    CAD (coronary artery disease)     x13, last placed in 2009-Followed by [address] Card[facility]     Cataract     Chronic kidney disease, stage III (moderate) 05/17/2016    Followed by Dr. Dov Kenney Chronic kidney failure 1/12/2016    Chronic musculoskeletal pain     back and left leg    Coronary artery disease involving native coronary artery without angina pectoris 5/17/2016    DDD (degenerative disc disease)     Diabetes mellitus, type 2 ([facility] [address]     Pre diabetes, no medication required    GERD (gastroesophageal reflux disease)     Controlled with medication     Gout     History of atrial fibrillation     treated with eliquis and plavix and multaq    History of basal cell carcinoma 2010    on hand , back and lip    History of complete eye exam     History of dental examination 08/2016    History of kidney stones     History of MI (myocardial infarction) 1993    History of MRSA infection     Hyperlipidemia     Hypertension     well controlled with medication    Morbid obesity with BMI of 40.0-44.9, adult ([facility] [address] 1/12/2016    Murmur 01/12/2016    Echo completed 11/26/12    Prediabetes     Pure hypercholesterolemia 4/22/2016    Status post left hip replacement 8/1/2016     Past Surgical History:   Procedure Laterality Date    CARDIAC SURG PROCEDURE UNLIST      13 stents , last one 5/2009, total of 7 heart caths    HX ANGIOPLASTY  8717-9054    multiple    HX BACK SURGERY  8/11/15    no hardware    HX CATARACT REMOVAL Bilateral     HX CHOLECYSTECTOMY      HX HEART CATHETERIZATION      HX HEENT      Dental surgery    HX HIP REPLACEMENT Left     HX MALIGNANT SKIN LESION EXCISION      basal cell     HX ORTHOPAEDIC Left     hip replacement    HX UROLOGICAL  1991    kidney stone extraction     Family History   Problem Relation Age of Onset    Kidney Disease Father     Heart Disease Mother     Stroke Mother     Arthritis-osteo Brother     Heart Disease Other     Hypertension Other      Social History     Social History    Marital status:      Spouse name: N/A    Number of children: N/A    Years of education: N/A     Occupational History    Not on file.      Social History Main Topics    Smoking status: Never Smoker    Smokeless tobacco: Never Used    Alcohol use No    Drug use: No    Sexual activity: Not on file     Other Topics Concern    Not on file     Social History Narrative     Current Facility-Administered Medications   Medication Dose Route Frequency Provider Last Rate Last Dose    sodium chloride (NS) flush 5-10 mL  5-10 mL IntraVENous Q8H Brooke Sanders NP   10 mL at 08/08/18 0519    sodium chloride (NS) flush 5-10 mL  5-10 mL IntraVENous PRN Rosario Suleiman, NP   5 mL at 08/08/18 0404    oxyCODONE-acetaminophen (PERCOCET) 5-325 mg per tablet 1 Tab  1 Tab Oral Q4H PRN Rosario Suleiman, NP        naloxone (NARCAN) injection 0.4 mg  0.4 mg IntraVENous PRN Rosario Bride, NP        ondansetron (ZOFRAN) injection 4 mg  4 mg IntraVENous Q4H PRN Rosario Bride, NP   4 mg at 08/08/18 1256    oxyCODONE-acetaminophen (PERCOCET) 5-325 mg per tablet 2 Tab  2 Tab Oral Q4H PRN Afia Garcia MD        oxyCODONE-acetaminophen (PERCOCET) 5-325 mg per tablet 1 Tab  1 Tab Oral Q4H PRN Afia Garcia MD        HYDROmorphone (PF) (DILAUDID) injection 0.5 mg  0.5 mg IntraVENous Q1H PRN Afia Garcia MD        HYDROmorphone (PF) (DILAUDID) injection 1 mg  1 mg IntraVENous Q1H PRN Kesha Freitas MD   1 mg at 18 0403    zolpidem (AMBIEN) tablet 5 mg  5 mg Oral QHS PRN Kesha Freitas MD        trimethoprim-sulfamethoxazole (BACTRIM DS, SEPTRA DS) 160-800 mg per tablet 1 Tab  1 Tab Oral Q12H Kesha Freitas MD   1 Tab at 18 1223       OBJECTIVE:  Patient Vitals for the past 8 hrs:   BP Temp Pulse Resp SpO2   18 1110 115/63 97.6 °F (36.4 °C) 81 18 94 %   18 0923 136/73 - 78 18 96 %   18 0858 133/74 - 88 20 -   18 0830 148/90 97.9 °F (36.6 °C) 88 18 95 %   18 0715 138/87 97.9 °F (36.6 °C) 82 16 94 %     Temp (24hrs), Av.8 °F (36.6 °C), Min:96.6 °F (35.9 °C), Max:98.8 °F (37.1 °C)         Physical Exam:  Constitutional: Well developed, well nourished male in no acute distress, lying comfortably in the hospital bed. Wife at bedside. HEENT: Normocephalic and atraumatic. Oropharynx is clear, mucous membranes are moist.  Extraocular muscles are intact. Sclerae anicteric. Neck supple without JVD. No thyromegaly present. Lymph node   No palpable submandibular, cervical, supraclavicular, axillary or inguinal lymph nodes. Skin Warm and dry. No bruising and no rash noted. No erythema. No pallor. Respiratory Lungs are clear to auscultation bilaterally without wheezes, rales or rhonchi, normal air exchange without accessory muscle use. CVS Normal rate, regular rhythm and normal S1 and S2. No murmurs, gallops, or rubs. Abdomen Obese, Soft, nontender and distended, normoactive bowel sounds. No palpable mass. No hepatosplenomegaly. Neuro Grossly nonfocal with no obvious sensory or motor deficits. MSK Normal range of motion in general.  No edema and no tenderness. Psych Appropriate mood and affect. Labs:    No results found for this or any previous visit (from the past 24 hour(s)).     Imaging:  IR PARACENTESIS ABD W IMAGE [[id number]] Collected: 18 1046      Order Status: Completed Updated: 08/08/18 1047     Narrative:       Title: Ultrasound guided paracentesis.      History: Ascites, gastric cancer. : Brendan Mcdaniel PA-C    Supervising Physician: Myke Flores M.D. Consent:  Informed written and oral consent was obtained from the patient after  the risks and benefits were discussed.  All questions were answered and the  patient requested that we proceed. Procedure:  Maximal sterile barrier technique was used.  Following routine prep  and drape of the abdomen, a local field block with 1% lidocaine was achieved. Ultrasound evaluation was performed. Fluid was identified in the peritoneal cavity. Using real-time ultrasound  guidance, the peritoneal cavity was accessed with an 8 [place] centesis catheter.   The catheter was connected to Vacutainer bottles. A total of 6550 cc of blood-tinged fluid was removed. The catheter was removed  and a bandage applied. Complications: None. Findings: Large-volume ascites.       Impression:       Impression: Uncomplicated ultrasound guided paracentesis.          ASSESSMENT:  Problem List  Date Reviewed: 7/19/2018          Codes Class Noted    Abdominal pain ICD-10-CM: R10.9  ICD-9-CM: 789.00  8/7/2018        Shortness of breath ICD-10-CM: R06.02  ICD-9-CM: 786.05  8/7/2018        Atelectasis ICD-10-CM: J98.11  ICD-9-CM: 518.0  7/21/2018        Acute renal failure superimposed on chronic kidney disease ([facility] ICD-10-CM: N17.9, N18.9  ICD-9-CM: 584.9, 585.9  7/21/2018        S/P laparoscopic cholecystectomy ICD-10-CM: Z90.49  ICD-9-CM: V45.89  7/20/2018        Ascites ICD-10-CM: R18.8  ICD-9-CM: 789.59  7/20/2018        * (Principal)Peritoneal carcinomatosis ([facility] ICD-10-CM: C78.6, C80.1  ICD-9-CM: 197.6, 199.1  7/20/2018        Thrombocytopenia (HCC) ICD-10-CM: D69.6  ICD-9-CM: 287.5  7/20/2018        Respiratory failure, post-operative ([facility] ICD-10-CM: M87.436  ICD-9-CM: 518.51  7/20/2018 Hypoxia ICD-10-CM: R09.02  ICD-9-CM: 799.02  7/19/2018        Anticoagulant long-term use ICD-10-CM: Z79.01  ICD-9-CM: V58.61  5/29/2018        Severe obesity (BMI 35.0-39. 9) with comorbidity ([facility] [address] ICD-10-CM: E66.01  ICD-9-CM: 278.01  4/3/2018        Benign prostatic hyperplasia ICD-10-CM: N40.0  ICD-9-CM: 600.00  8/9/2016        Chronic kidney disease, stage III (moderate) ICD-10-CM: N18.3  ICD-9-CM: 585.3  5/17/2016        Pure hypercholesterolemia ICD-10-CM: E78.00  ICD-9-CM: 272.0  4/22/2016        Essential hypertension with goal blood pressure less than 140/90 ICD-10-CM: I10  ICD-9-CM: 401.9  4/22/2016        Atrial Fibrillation, Paroxysmal (HCC) ICD-10-CM: I48.91  ICD-9-CM: 427.31  1/12/2016        Obesity ICD-10-CM: E66.9  ICD-9-CM: 278.00  1/12/2016        Prediabetes ICD-10-CM: R73.03  ICD-9-CM: 790.29  10/22/2015        CAD (coronary artery disease) ICD-10-CM: I25.10  ICD-9-CM: 414.00  Unknown                RECOMMENDATIONS:  Peritoneal Carcinomatosis  - Was scheduled to meet with Dr. Mahad Islas on 8/10  - Peritoneal bx +moderately differentiated adenocarcinoma, +ve for CA 19-9 and CK7.  - Pt states he is interested in pursing diagnosis and treatment  - Check CAP with oral contrast, CA 19-9, CEA, iron studies. Send peritoneal fluid for cytology. - Consult GI - send tissue for NGS Caris, HER2, KARENA pathway    Difficulty eating/taking medications  - SLP consult  - RD consult    Lab studies and imaging studies were personally reviewed. Thank you for allowing us to participate in the care of Mr. Radha William. Corina Ramirez, RAN [facility]  [address]  Office : [phone]  Fax : [phone]       Attending Addendum:  I personally evaluated the patient with [facility] Flaming N.P.,  and agree with the assessment, findings and plan as documented. Appears stable, heart regular without murmur, lungs clear, abdomen soft on palpation.   Elderly gentleman, h/o CAD w MI, HTN, CKD3, DM2, GERD, A, basal cell carcinoma, more recently underwent lap jose armando, liver biopsy, ascites fluid removal as well as peritoneal implant biopsies. He has since been diagnosed w peritoneal carcinomatosis. Per OR note 7/19/18 Dr [place], Daisy Esposito was cholilithiasis, biliary dyskinesia as well as unexpected findings of large volume ascites (over 10 L removed), as well as diffuse peritoneal implants. He was also found to have hepatomegaly and fibrosis of liver\". Path reviewed: peritoneal implants w moderately differentiated adenocarcinoma w IHC picture favoring an upper GI or pancreaticobiliary origin. Gallbladder read as having mild cholecystitis, and liver biopies revealed unremarkable parenchyma. He was scheduled to see oncology, but is now admitted w N/V/weight loss and poor po intake. After discussing his disease and various treatment options including chemotherapy and palliative care, pt has opted to pursue cancer directed care. Will need to look for site of origin: recommend endoscopic w/u as well as CT CAP. Will get tumor markers CEA, CA19.9. Nutrition and PT/OT to see pt as well. Thank you for allowing us to participate in care of this pleasant patient. We will follow along.                    Jeri Rawls MD  [address]  Office : [phone]  Fax : [phone]

## 2018-08-08 NOTE — CONSULTS
[facility] Consult Note       Primary GI Physician: Kyle Brown MD  Consulting GI Physician: Selin Traore MD  Referring Physician: Lamar Mclaughlin NP    Consult Date:  8/8/2018  Admit Date:  8/7/2018    Chief Complaint:  Dysphagia    Subjective:     History of Present Illness:  Patient is a 70 y.o. male with PMHx that includes, but not limited to CAD, stage III CKD, GERD, DM, Afib on Eliquis, HTN, HLD, and colon polyps who is seen in consultation at the request of Lamar Mclaughlin NP for dysphagia. Patient presented with c/o N/V, 25lbs weight loss over past few months, and SOB d/t distended abdomen. Breathing improved after 7L of ascitic fluid removed. Patient was recently diagnosed with peritoneal carcinomatosis. At first, patient was not interested in treatment, but after meeting with Oncology, he expressed interest in pursuing diagnosis and treatment. However, after talking with Palliative care, he has decided against treatment. He reports having difficulty swallowing both solids and liquids over the past several weeks. Food boluses gets hung up in the middle of his esophagus. Recent SLP evaluation was neg for aspiration. He has a hx of esophageal stricture. EGD 2/2012 with Dr. Logan Perez for dysphagia revealed gastroduodenitis and esophageal stricture (no dilation performed due to inflammation). Gastric bxs neg for H pylori. Esophageal bxs were benign. Patient would like to undergo EGD with dilation to improve quality of life only. Last dose of Elquis was 5 days ago.      PMH:  Past Medical History:   Diagnosis Date    Aortic valve insufficiency 1/12/2016    Bicuspid aortic valve 1/12/2016    CAD (coronary artery disease)     x13, last placed in 2009-Followed by [facility]     Cataract     Chronic kidney disease, stage III (moderate) 05/17/2016    Followed by Dr. Deric Reyes Chronic kidney failure 1/12/2016    Chronic musculoskeletal pain     back and left leg    Coronary artery disease involving native coronary artery without angina pectoris 5/17/2016    DDD (degenerative disc disease)     Diabetes mellitus, type 2 ([facility] [address]     Pre diabetes, no medication required    GERD (gastroesophageal reflux disease)     Controlled with medication     Gout     History of atrial fibrillation     treated with eliquis and plavix and multaq    History of basal cell carcinoma 2010    on hand , back and lip    History of complete eye exam     History of dental examination 08/2016    History of kidney stones     History of MI (myocardial infarction) 1993    History of MRSA infection     Hyperlipidemia     Hypertension     well controlled with medication    Morbid obesity with BMI of 40.0-44.9, adult ([facility] [address] 1/12/2016    Murmur 01/12/2016    Echo completed 11/26/12    Prediabetes     Pure hypercholesterolemia 4/22/2016    Status post left hip replacement 8/1/2016       PSH:  Past Surgical History:   Procedure Laterality Date    CARDIAC SURG PROCEDURE UNLIST      13 stents , last one 5/2009, total of 7 heart caths    HX ANGIOPLASTY  2498-9114    multiple    HX BACK SURGERY  8/11/15    no hardware    HX CATARACT REMOVAL Bilateral     HX CHOLECYSTECTOMY      HX HEART CATHETERIZATION      HX HEENT      Dental surgery    HX HIP REPLACEMENT Left     HX MALIGNANT SKIN LESION EXCISION      basal cell     HX ORTHOPAEDIC Left     hip replacement    HX UROLOGICAL  1991    kidney stone extraction       Allergies: Allergies   Allergen Reactions    Potassium Nausea Only       Home Medications:  Prior to Admission medications    Medication Sig Start Date End Date Taking? Authorizing Provider   ondansetron (ZOFRAN ODT) 8 mg disintegrating tablet Take 1 Tab by mouth every six (6) hours as needed for Nausea (and vomiting).  Indications: PREVENTION OF POST-OPERATIVE NAUSEA AND VOMITING 7/31/18   YULIANA Perez   oxyCODONE-acetaminophen (PERCOCET) 5-325 mg per tablet Take 1 Tab by mouth every four (4) hours as needed (Mild pain. ). Max Daily Amount: 6 Tabs. 7/24/18   Nick Lu NP   ondansetron hcl (ZOFRAN) 8 mg tablet Take 1 Tab by mouth every eight (8) hours as needed for Nausea. 7/24/18   Nick Lu NP   polyethylene glycol (MIRALAX) 17 gram packet Take 1 Packet by mouth daily for 14 days. Hold for loose stools 7/24/18 8/7/18  Nick Lu NP   aspirin delayed-release 81 mg tablet Take 81 mg by mouth daily. Patient instructed to take    Historical Provider   simethicone (MYLICON) 80 mg chewable tablet Take 1 Tab by mouth every six (6) hours as needed for Flatulence. 6/20/18   Gladys Fernandez MD   dronedarone (MULTAQ) tab tablet Take 1 Tab by mouth two (2) times daily (with meals). 5/29/18   Tony Pinzon MD   apixaban (ELIQUIS) 5 mg tablet Take 1 Tab by mouth two (2) times a day. Patient taking differently: Take 5 mg by mouth two (2) times a day. Patient states last dose 7/16/18 5/29/18   Tony Pinzon MD   isosorbide mononitrate ER (IMDUR) 60 mg CR tablet Take 1 Tab by mouth every morning. 5/29/18   Tony Pinzon MD   clopidogrel (PLAVIX) 75 mg tab Take 1 Tab by mouth daily. Patient taking differently: Take 75 mg by mouth daily. Patient reports last dose 7/13/18 5/29/18   Tony Pinzon MD   famotidine (PEPCID) 40 mg tablet Take 1 Tab by mouth nightly. 5/6/18   Annette Iqzuierdo III, MD   fenofibrate (LOFIBRA) 160 mg tablet Take 1 Tab by mouth every morning. 4/25/18   Gladys Fernandez MD   lisinopril-hydroCHLOROthiazide (PRINZIDE, ZESTORETIC) 20-25 mg per tablet Take 1 Tab by mouth every morning. 4/25/18   Gladys Fernandez MD   rosuvastatin (CRESTOR) 20 mg tablet Take 1 Tab by mouth nightly. 4/25/18   Gladys Fernandez MD   pantoprazole (PROTONIX) 40 mg tablet Take 1 Tab by mouth nightly. 4/25/18   Gladys Fernandez MD   tamsulosin [facility]) 0.4 mg capsule Take 1 Cap by mouth daily. Patient taking differently: Take 0.4 mg by mouth nightly.  4/25/18   Gladys Fernandez MD   felodipine (PLENDIL SR) 10 mg 24 hr tablet Take 1 Tab by mouth daily. 4/25/18   Tomas Hensley MD   potassium citrate (UROCIT-K 10) 10 mEq (1,080 mg) TbER Take  by mouth two (2) times a day. Historical Provider   metoprolol tartrate (LOPRESSOR) 50 mg tablet Take 1 Tab by mouth daily. Patient taking differently: Take 50 mg by mouth daily. 1/2 tab twice a day 5/17/16   Beltran Law MD   TRAVATAN Z 0.004 % ophthalmic solution Administer 1 Drop to both eyes nightly. Once daily at bedtime 7/16/15   Historical Provider   Cholecalciferol, Vitamin D3, (VITAMIN D3) 1,000 unit cap Take 2,000 Units by mouth daily. Historical Provider   nitroglycerin (NITROSTAT) 0.4 mg SL tablet 0.4 mg by SubLINGual route every five (5) minutes as needed. Take / use AM day of surgery  per anesthesia protocols if needed. Jorge Alberto Short MD   allopurinol (ZYLOPRIM) 300 mg tablet Take 300 mg by mouth nightly. Indications: GOUT    Jorge Alberto Short MD   finasteride (PROSCAR) 5 mg tablet Take 5 mg by mouth nightly.     Jorge Alberto Short MD       Hospital Medications:  Current Facility-Administered Medications   Medication Dose Route Frequency    sodium chloride (NS) flush 5-10 mL  5-10 mL IntraVENous Q8H    sodium chloride (NS) flush 5-10 mL  5-10 mL IntraVENous PRN    oxyCODONE-acetaminophen (PERCOCET) 5-325 mg per tablet 1 Tab  1 Tab Oral Q4H PRN    naloxone (NARCAN) injection 0.4 mg  0.4 mg IntraVENous PRN    ondansetron (ZOFRAN) injection 4 mg  4 mg IntraVENous Q4H PRN    oxyCODONE-acetaminophen (PERCOCET) 5-325 mg per tablet 2 Tab  2 Tab Oral Q4H PRN    oxyCODONE-acetaminophen (PERCOCET) 5-325 mg per tablet 1 Tab  1 Tab Oral Q4H PRN    HYDROmorphone (PF) (DILAUDID) injection 0.5 mg  0.5 mg IntraVENous Q1H PRN    HYDROmorphone (PF) (DILAUDID) injection 1 mg  1 mg IntraVENous Q1H PRN    zolpidem (AMBIEN) tablet 5 mg  5 mg Oral QHS PRN    trimethoprim-sulfamethoxazole (BACTRIM DS, SEPTRA DS) 160-800 mg per tablet 1 Tab  1 Tab Oral Q12H Social History:  Social History   Substance Use Topics    Smoking status: Never Smoker    Smokeless tobacco: Never Used    Alcohol use No       Pt denies any history of drug use, blood transfusions, or tattoos. Family History:  Family History   Problem Relation Age of Onset    Kidney Disease Father     Heart Disease Mother     Stroke Mother     Arthritis-osteo Brother     Heart Disease Other     Hypertension Other        Review of Systems:  A detailed 10 system ROS is obtained, with pertinent positives as listed above. All others are negative. Objective:     Physical Exam:  Vitals:  Visit Vitals    /63    Pulse 81    Temp 97.6 °F (36.4 °C)    Resp 18    SpO2 94%     Gen:  Pt is alert, cooperative, no acute distress  Skin:  Extremities and face reveal no rashes. HEENT: Sclerae anicteric. Extra-occular muscles are intact. No oral ulcers. No abnormal pigmentation of the lips. The neck is supple. Cardiovascular: Regular rate and rhythm. No murmurs, gallops, or rubs. Respiratory:  Comfortable breathing with no accessory muscle use. Clear breath sounds anteriorly with no wheezes, rales, or rhonchi. GI:  Abdomen is OBESE, nondistended, soft, and nontender. Normal active bowel sounds. No enlargement of the liver or spleen. No masses palpable. Rectal:  Deferred  Musculoskeletal:  No pitting edema of the lower legs. Neurological:  Gross memory appears intact. Patient is alert and oriented. Psychiatric:  Mood appears appropriate with judgement intact. Lymphatic:  No cervical or supraclavicular adenopathy. Laboratory:    No results for input(s): WBC, HGB, HCT, PLT, MCV, NA, K, CL, CO2, BUN, CREA, CA, MG, GLU, AP, SGOT, GPT, ALT, TBIL, TBILI, CBIL, ALB, TP, AML, LPSE, PTP, INR, APTT, HGBEXT, HCTEXT, PLTEXT in the last 72 hours.     No lab exists for component: DBIL, INREXT       Assessment:     Principal Problem:  Peritoneal carcinomatosis ([facility] (7/20/2018)    Active Problems:  Ascites (7/20/2018)  Abdominal pain (8/7/2018)  Shortness of breath (8/7/2018)    70 y.o. male with PMHx that includes, but not limited to CAD, stage III CKD, GERD, DM, Afib on , HTN, HLD, and colon polyps who is seen in consultation at the request of Remedios Valentin NP for dysphagia. Patient was recently diagnosed with peritoneal carcinomatosis. After speaking with Palliative Care, patient does not want treatment, but would like to undergo EGD with dilation to improve quality of life. Last dose of Eliquis was 5 days ago. Plan:     EGD with dilation tomorrow. Patient is seen and examined in collaboration with Dr. Hoa Hayward.   Assessment and plan as per Dr. Anna Maldonado PA-C

## 2018-08-08 NOTE — ROUTINE PROCESS
END OF SHIFT NOTE:    INTAKE/OUTPUT  08/07 0701 - 08/08 0700  In: 562 [P.O.:300; I.V.:262]  Out: 75 [Urine:75]  Voiding: YES  Catheter: NO  Drain:              Flatus: Patient does have flatus present. Stool:  1 occurrences. Characteristics:  Stool Assessment  Stool Appearance: Soft (per patient's report)    Emesis: 0 occurrences. Characteristics:        VITAL SIGNS  Patient Vitals for the past 12 hrs:   Temp Pulse Resp BP SpO2   08/08/18 0400 98 °F (36.7 °C) 84 16 121/75 93 %   08/08/18 0000 98.8 °F (37.1 °C) 85 16 134/89 92 %   08/07/18 2000 97.8 °F (36.6 °C) 85 16 109/64 95 %       Pain Assessment  Pain Intensity 1: 8 (08/07/18 1945)  Pain Location 1: Abdomen, Back  Pain Intervention(s) 1: Medication (see MAR)  Patient Stated Pain Goal: 0    Ambulating  Yes    Shift report given to oncoming nurse at the bedside.     Gianni Ruth LPN

## 2018-08-09 NOTE — PROGRESS NOTES
TRANSFER - IN REPORT:    Verbal report received from Ilia Joseph on Charity January  being received from GI Lab for routine post - op      Report consisted of patients Situation, Background, Assessment and   Recommendations(SBAR). Information from the following report(s) SBAR, Procedure Summary and MAR was reviewed with the receiving nurse. Opportunity for questions and clarification was provided. Assessment completed upon patients arrival to unit and care assumed.

## 2018-08-09 NOTE — PROCEDURES
DATE OF PROCEDURE: 8/9/18    REQUESTING PHYSICIAN: Dr Anuj Sheldon: Esophagogastroduodenoscopy. ENDOSCOPIST: Myra Ceja M.D. PREOPERATIVE DIAGNOSIS: Dysphagia    POSTOPERATIVE DIAGNOSIS: Esophagitis, Esophageal stricture, Gastritis     INSTRUMENTS: GIF H190    SEDATION: MAC    DESCRIPTION: After informed consent was obtained, the patient was taken to the endoscopy suite and placed in the left lateral decubitus position. Intravenous sedation was administered as above and posterior pharynx was anesthetized with local anesthetic spray. After adequate sedation had been achieved the endoscope was inserted over the tongue and through the posterior pharynx under direct visualization down the esophagus to the stomach and into the second portion of the duodenum. The endoscopic was withdrawn from that point, performing a careful survey of the mucosa. Retroflexion was performed in the gastric fundus. FINDINGS:  Esophagus: Normal proximal mucosa with LA grade C distal esophagitis with mild narrowing. This was dilated with Savary (51,54,57Fr) with minimal trauma. Stomach: Diffuse gastritis noted. Duodenum: Normal mucosa. Estimated blood loss:  0 cc-minimal    IMPRESSION:   Esophageal stricture  Esophagitis  Gastritis    PLAN:  - Soft diet, advance as tolerated  - D/C home on PPI bid and liquid Carafate 1gm ACHS  - GI to sign off. Please call with questions.      Myra Ceja MD

## 2018-08-09 NOTE — INTERDISCIPLINARY ROUNDS
Interdisciplinary team rounds were held 8/9/2018 with the following team members:Care Management and Nursing and the patient, spouse and friend. Final Hospice plans discussed. See clinical pathway and/or care plan for interventions and desired outcomes.

## 2018-08-09 NOTE — PROGRESS NOTES
[facility]        Inpatient Hematology / Oncology Progress Note      Admission Date: 2018  3:03 PM  Reason for Admission/Hospital Course: Dysphagia, unspecified type [R13.10]      24 Hour Events:  Afebrile, VSS  CT with signs of metastatic disease  Pt not interested in treatment  Hospice consult pending    ROS:  Constitutional: +fatigue. Negative for fever, chills, weakness, malaise. CV: Negative for chest pain, palpitations, edema. Respiratory: Negative for dyspnea, cough, wheezing. GI: Negative for nausea, abdominal pain, diarrhea. 10 point review of systems is otherwise negative with the exception of the elements mentioned above in the HPI. Allergies   Allergen Reactions    Potassium Nausea Only       OBJECTIVE:  Patient Vitals for the past 8 hrs:   BP Temp Pulse Resp SpO2   18 1115 116/70 97.4 °F (36.3 °C) 85 18 95 %   18 1056 145/65 - 64 14 97 %   18 1051 136/68 - 65 16 95 %   18 1040 143/70 - 60 16 96 %   18 1031 125/69 98.3 °F (36.8 °C) 75 14 96 %   18 0836 144/65 - 74 16 92 %   18 0730 118/75 97.9 °F (36.6 °C) 80 18 92 %     Temp (24hrs), Av.9 °F (36.6 °C), Min:97.4 °F (36.3 °C), Max:98.7 °F (37.1 °C)     0701 -  1900  In: 250 [I.V.:250]  Out: 0     Physical Exam:  Constitutional: Well developed, well nourished male in no acute distress, lying comfortably in the hospital bed. HEENT: Normocephalic and atraumatic. Oropharynx is clear, mucous membranes are moist.  Extraocular muscles are intact. Sclerae anicteric. Neck supple without JVD. No thyromegaly present. Lymph node No palpable submandibular, cervical, supraclavicular, axillary or inguinal lymph nodes. Skin Warm and dry. No bruising and no rash noted. No erythema. No pallor. Respiratory Lungs are clear to auscultation bilaterally without wheezes, rales or rhonchi, normal air exchange without accessory muscle use.     CVS Normal rate, regular rhythm and normal S1 and S2. No murmurs, gallops, or rubs. Abdomen Obese, Soft, nontender and distended, normoactive bowel sounds. No palpable mass. No hepatosplenomegaly. Neuro Grossly nonfocal with no obvious sensory or motor deficits. MSK Normal range of motion in general.  No edema and no tenderness. Psych Appropriate mood and affect.           Labs:    No results for input(s): WBC, RBC, HGB, HCT, MCV, MCH, MCHC, RDW, PLT, GRANS, LYMPH, MONOS, EOS, BASOS, IG, DF, ANEU, ABL, ABM, KRISTA, ABB, AIG, HGBEXT, HCTEXT, PLTEXT in the last 72 hours. No lab exists for component: MPV   Recent Labs      08/08/18   1456   CREA  1.59*         Imaging:  CT CHEST ABD PELV W CONT [[id number]] Collected: 08/08/18 1858      Order Status: Completed Updated: 08/08/18 1911     Narrative:       CT CHEST, ABDOMEN AND PELVIS WITH INTRAVENOUS CONTRAST DATED 8/8/2018. History: Status post laparoscopic cholecystectomy with peritoneal  carcinomatosis. Comparison: CT abdomen and pelvis without contrast 8/4/2011     Technique:   Multiple contiguous helical CT images reconstructed at 5 mm were  obtained from the base of the neck to the ischial tuberosities following 100 cc  Isovue-370 without acute complication.  All CT scans performed at this facility  use one or all of the following: Automated exposure control, adjustment of the  mA and/or kVp according to patient's size, iterative reconstruction. Findings:  CT Chest:  The base of the neck is unremarkable in appearance.  No axillary, mediastinal,  or hilar lymphadenopathy is seen.  The thoracic aorta is normal in caliber. Evaluation with lung windows demonstrates multiple bilateral pulmonary nodules  the largest of which is felt to reside in the right upper lobe seen on image 16  measuring 9 mm in size concerning for pulmonary metastases. Small to moderate  bilateral dependent pleural effusions are seen, right greater than left.  Some  pleural thickening and enhancement is seen best appreciated on image 45 which  malignant effusions cannot be excluded.  Lungs are expanded without evidence for  pneumothorax.  No aggressive osseous lesion is seen. The bilateral humeri have  an irregular appearance. This is favored to result from their positioning with  some likely motion during imaging. CT ABDOMEN:    The Liver demonstrates multiple hypodense lesions which are not clearly cystic  the largest of which resides in the left lobe of the liver on image 47 measuring  1.9 cm in size concerning for hepatic metastases.  The spleen demonstrates a 5  mm cystic lesion in its anterior pole which is not felt to be worrisome.  No  contour deforming or enhancing mass lesions are seen of the adrenal glands. Although no definite enhancing mass is seen of the pancreas, there is abnormal  asymmetric atrophy and pancreatic ductal dilation in the pancreatic tail best  appreciated on axial image 61 suggesting an obstructing pancreatic lesion.  The  gallbladder is not seen.  The kidneys enhance symmetrically and no evidence of  hydronephrosis is seen.  The patient has a horseshoe kidney. Small  nonobstructing stones are seen in the bilateral renal moieties. Benign-appearing  cystic lesions are seen of the left renal moeity. The visualized loops of small bowel and colon are normal in caliber.  The  appendix is felt to be seen on image 101 without clear abnormality. No free air  is seen. However, scattered mild ascites is seen which demonstrates peritoneal  enhancement/nodularity raising concern for malignant ascites. In addition,  abnormal peritoneal masses are seen most evident in the right mid to lower  abdomen on image 99.  These are highly concerning for peritoneal metastatic  disease.  No adenopathy is seen of the abdomen.  The abdominal aorta  demonstrates moderate atherosclerotic calcification and multiple areas of mild  to moderate distal ectasia.  No aggressive osseous lesion is seen.    CT PELVIS:  No abnormal pelvic fluid collections are present.  No pelvic adenopathy is seen.   The urinary bladder is unremarkable.  No aggressive osseous lesion is seen. A  left total hip replacement is seen.       Impression:       IMPRESSION:    1.  Abnormal atrophy and pancreatic ductal dilation in the pancreatic tail  raising concern for an obstructing pancreatic lesion although this is not  clearly demonstrated on this exam. Primary malignancy at this level cannot be  excluded. No definite potential primary lesion is otherwise seen. 2.  Multiple findings concerning for metastatic disease. There are multiple  bilateral pulmonary nodules, multiple hepatic lesions, complex pleural effusion,  complex ascites, and abnormal peritoneal masses. 3.  Very irregular appearance of the bilateral humeri which is favored to be  artifactual. If the patient has symptoms in the bilateral humeri, then dedicated  plain film imaging would be recommended to better exclude potential pathology.         IR PARACENTESIS ABD W IMAGE [[id number]] Collected: 08/08/18 1046     Order Status: Completed Updated: 08/08/18 1047     Narrative:       Title: Ultrasound guided paracentesis.      History: Ascites, gastric cancer. : Ana Mckeon PA-C    Supervising Physician: Venecia Jaime M.D. Consent:  Informed written and oral consent was obtained from the patient after  the risks and benefits were discussed.  All questions were answered and the  patient requested that we proceed. Procedure:  Maximal sterile barrier technique was used.  Following routine prep  and drape of the abdomen, a local field block with 1% lidocaine was achieved. Ultrasound evaluation was performed. Fluid was identified in the peritoneal cavity. Using real-time ultrasound  guidance, the peritoneal cavity was accessed with an 8 Croatian centesis catheter.   The catheter was connected to Vacutainer bottles.     A total of 6550 cc of blood-tinged fluid was removed. The catheter was removed  and a bandage applied. Complications: None. Findings: Large-volume ascites.       Impression:       Impression: Uncomplicated ultrasound guided paracentesis. ASSESSMENT:    Problem List  Date Reviewed: 8/9/2018          Codes Class Noted    Abdominal pain ICD-10-CM: R10.9  ICD-9-CM: 789.00  8/7/2018        Shortness of breath ICD-10-CM: R06.02  ICD-9-CM: 786.05  8/7/2018        Atelectasis ICD-10-CM: J98.11  ICD-9-CM: 518.0  7/21/2018        Acute renal failure superimposed on chronic kidney disease ([facility] ICD-10-CM: N17.9, N18.9  ICD-9-CM: 584.9, 585.9  7/21/2018        S/P laparoscopic cholecystectomy ICD-10-CM: Z90.49  ICD-9-CM: V45.89  7/20/2018        Ascites ICD-10-CM: R18.8  ICD-9-CM: 789.59  7/20/2018        * (Principal)Peritoneal carcinomatosis ([facility] ICD-10-CM: C78.6, C80.1  ICD-9-CM: 197.6, 199.1  7/20/2018        Thrombocytopenia (HCC) ICD-10-CM: D69.6  ICD-9-CM: 287.5  7/20/2018        Respiratory failure, post-operative ([facility] ICD-10-CM: C09.798  ICD-9-CM: 518.51  7/20/2018        Hypoxia ICD-10-CM: R09.02  ICD-9-CM: 799.02  7/19/2018        Anticoagulant long-term use ICD-10-CM: Z79.01  ICD-9-CM: V58.61  5/29/2018        Severe obesity (BMI 35.0-39. 9) with comorbidity ([facility] ICD-10-CM: E66.01  ICD-9-CM: 278.01  4/3/2018        Benign prostatic hyperplasia ICD-10-CM: N40.0  ICD-9-CM: 600.00  8/9/2016        Chronic kidney disease, stage III (moderate) ICD-10-CM: N18.3  ICD-9-CM: 585.3  5/17/2016        Pure hypercholesterolemia ICD-10-CM: E78.00  ICD-9-CM: 272.0  4/22/2016        Essential hypertension with goal blood pressure less than 140/90 ICD-10-CM: I10  ICD-9-CM: 401.9  4/22/2016        Atrial Fibrillation, Paroxysmal (HCC) ICD-10-CM: I48.91  ICD-9-CM: 427.31  1/12/2016        Obesity ICD-10-CM: E66.9  ICD-9-CM: 278.00  1/12/2016        Prediabetes ICD-10-CM: R73.03  ICD-9-CM: 790.29  10/22/2015        CAD (coronary artery disease) ICD-10-CM: I25.10  ICD-9-CM: 414.00  Unknown            Mr. Azar Perry is a 70 y.o. male admitted on 8/7/2018 with a primary diagnosis of The encounter diagnosis was Peritoneal carcinomatosis ([facility] .       His PMH includes CAD, MI, HTN, CKD3, DM2, GERD, Afib, hx basal cell carcinoma and s/p lap jose armando on 7/19. He was recently diagnosed with peritoneal carcinomatosis and was scheduled to meet with Dr. Jenae Bowen on 8/10. He presented with c/o nausea, vomiting, 25lbs weight loss over past few months, and shortness of breath d/t distended abdomen. He also c/o difficulty eating. States food/pills get \"stuck in my chest\". He states he hasn't taken any medications in almost a month as it causes him to vomit. He also has been on a liquid diet for the past month. 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 He had mentioned to primary team that he was not interested in treatment. But upon further discussion, he has expressed interest in pursing diagnosis and will attempt treatment. We were consulted to discuss treatment options. PLAN:  Peritoneal Carcinomatosis  - Was scheduled to meet with Dr. Jenae Bowen on 8/10  - Peritoneal bx +moderately differentiated adenocarcinoma, +ve for CA 19-9 and CK7.  - Pt states he is interested in pursing diagnosis and treatment  - Check CAP with oral contrast, CA 19-9, CEA, iron studies. Send peritoneal fluid for cytology. - Consult GI - send tissue for NGS Caris, HER2, KARENA pathway  8/9 CT with signs of metastatic disease. CA 19-9 elevated at 537. Pt states that he would not like to pursue treatment and is interested in Hospice. Hospice consult pending.     Difficulty eating/taking medications  - SLP consult  - RD consult     Thank you for allowing us to participate in the care of Mr. Azar Perry. We will sign off. If we can be of further assistance, please do not hesitate to call.               Simmie Fleischer, NP   [facility]  [address] [place], [place] [id number] [facility]  Office : [phone]  Fax : [phone]         Attending Addendum:  I personally evaluated the patient with Jimy Russell N.P.,  and agree with the assessment, findings and plan as documented. Appears stable, heart regular without murmur, lungs clear, abdomen benign. He has decided to pursue hospice care and this is reasonable. Support provided.                  Jamaal Weber MD  [place] [id number] [place] [id number] [facility]  Office : [phone]  Fax : [phone]

## 2018-08-09 NOTE — PROGRESS NOTES
TRANSFER - OUT REPORT:    Verbal report given to Emery(name) on Charity January  being transferred to GI LAB(unit) for ordered procedure       Report consisted of patients Situation, Background, Assessment and   Recommendations(SBAR). Information from the following report(s) Kardex was reviewed with the receiving nurse. Lines:   Peripheral IV 08/07/18 Right Antecubital (Active)   Site Assessment Clean, dry, & intact 8/9/2018  1:08 AM   Phlebitis Assessment 0 8/9/2018  1:08 AM   Infiltration Assessment 0 8/9/2018  1:08 AM   Dressing Status Clean, dry, & intact 8/9/2018  1:08 AM   Dressing Type Tape;Transparent 8/9/2018  1:08 AM   Hub Color/Line Status Capped; Patent 8/9/2018  1:08 AM   Alcohol Cap Used No 8/7/2018  4:15 PM        Opportunity for questions and clarification was provided.       Patient transported with:   [employer]

## 2018-08-09 NOTE — PROGRESS NOTES
END OF SHIFT NOTE:    INTAKE/OUTPUT  08/08 0701 - 08/09 0700  In: 710 [P.O.:710]  Out: 175 [Urine:175]  Voiding: YES  Catheter: NO  Drain:              Flatus: Patient does have flatus present. Stool:  0 occurrences. Characteristics:  Stool Assessment  Stool Appearance: Soft (per patient's report)    Emesis: 0 occurrences. Characteristics:        VITAL SIGNS  Patient Vitals for the past 12 hrs:   Temp Pulse Resp BP SpO2   08/09/18 1531 98.7 °F (37.1 °C) 78 18 120/74 93 %   08/09/18 1115 97.4 °F (36.3 °C) 85 18 116/70 95 %   08/09/18 1056 - 64 14 145/65 97 %   08/09/18 1051 - 65 16 136/68 95 %   08/09/18 1040 - 60 16 143/70 96 %   08/09/18 1031 98.3 °F (36.8 °C) 75 14 125/69 96 %   08/09/18 0836 - 74 16 144/65 92 %   08/09/18 0730 97.9 °F (36.6 °C) 80 18 118/75 92 %       Pain Assessment  Pain Intensity 1: 0 (08/09/18 1345)  Pain Location 1: Chest  Pain Intervention(s) 1: Medication (see MAR)  Patient Stated Pain Goal: 0    Ambulating  No    Shift report given to oncoming nurse at the bedside. More comfortable after dilaudid given iv. \" best rest I've had in weeks. \"    Mayme Landau, RN

## 2018-08-09 NOTE — PROGRESS NOTES
General Surgery Progress Note    8/9/2018    Admit Date: 8/7/2018    Subjective:     Surgery   S/p EGD, still complains of difficulty swallowing. Cleared by SLP. Seen by Hospice with plans to set up home for discharge home tomorrow. Refusing antibiotics. Objective:     Visit Vitals    /70    Pulse 85    Temp 97.4 °F (36.3 °C)    Resp 18    Wt 211 lb 8 oz (95.9 kg)    SpO2 95%    BMI 31.23 kg/m2         Intake/Output Summary (Last 24 hours) at 08/09/18 1523  Last data filed at 08/09/18 1255   Gross per 24 hour   Intake              730 ml   Output              175 ml   Net              555 ml        EXAM:  ABD soft, distended, positive fluid wave, active BS'S. Wounds unchanged. Data Review    Recent Results (from the past 24 hour(s))   GLUCOSE, POC    Collection Time: 08/09/18  8:40 AM   Result Value Ref Range    Glucose (POC) 100 65 - 100 mg/dL        Hospital Problems  Date Reviewed: 8/9/2018          Codes Class Noted POA    Abdominal pain ICD-10-CM: R10.9  ICD-9-CM: 789.00  8/7/2018 Yes        Shortness of breath ICD-10-CM: R06.02  ICD-9-CM: 786.05  8/7/2018 Yes        Ascites ICD-10-CM: R18.8  ICD-9-CM: 789.59  7/20/2018 Yes        * (Principal)Peritoneal carcinomatosis (HCC) ICD-10-CM: C78.6, C80.1  ICD-9-CM: 197.6, 199.1  7/20/2018 Yes          1. Palliative Care following  2. Oncology signed off  3. D/C Bactrim  4. Low salt diet.   5. Home with Hospice tomorrow      Bharath Hathaway NP

## 2018-08-09 NOTE — ROUTINE PROCESS
END OF SHIFT NOTE:    INTAKE/OUTPUT  08/08 0701 - 08/09 0700  In: 710 [P.O.:710]  Out: 175 [Urine:175]  Voiding: YES  Catheter: NO  Drain:              Flatus: Patient does have flatus present. Stool:  0 occurrences. Characteristics:  Stool Assessment  Stool Appearance: Soft (per patient's report)    Emesis: 0 occurrences. Characteristics:        VITAL SIGNS  Patient Vitals for the past 12 hrs:   Temp Pulse Resp BP SpO2   08/09/18 0730 97.9 °F (36.6 °C) 80 18 118/75 92 %   08/09/18 0332 98 °F (36.7 °C) 85 18 107/69 93 %   08/08/18 2220 97.5 °F (36.4 °C) 79 18 133/77 91 %       Pain Assessment  Pain Intensity 1: 6 (08/09/18 0405)  Pain Location 1: Abdomen  Pain Intervention(s) 1: Medication (see MAR)  Patient Stated Pain Goal: 0    Ambulating  Yes    Shift report given to oncoming nurse at the bedside.     Rocky Gonzalez LPN

## 2018-08-09 NOTE — ANESTHESIA POSTPROCEDURE EVALUATION
Post-Anesthesia Evaluation and Assessment    Patient: Karthik Machado MRN: [id number]  SSN: [id number]    YOB: 1940  Age: 70 y.o. Sex: male       Cardiovascular Function/Vital Signs  Visit Vitals    /68    Pulse 65    Temp 36.8 °C (98.3 °F)    Resp 16    Wt 95.9 kg (211 lb 8 oz)    SpO2 95%    BMI 31.23 kg/m2       Patient is status post total IV anesthesia anesthesia for Procedure(s):  ESOPHAGOGASTRODUODENOSCOPY (EGD)/ 32/ 218  ESOPHAGEAL DILATION. Nausea/Vomiting: None    Postoperative hydration reviewed and adequate. Pain:  Pain Scale 1: Numeric (0 - 10) (08/09/18 1051)  Pain Intensity 1: 0 (08/09/18 1051)   Managed    Neurological Status:   Neuro  Neurologic State: Alert (08/08/18 1400)  Orientation Level: Oriented X4 (08/08/18 1400)   At baseline    Mental Status and Level of Consciousness: Arousable    Pulmonary Status:   O2 Device: Room air (08/09/18 1051)   Adequate oxygenation and airway patent    Complications related to anesthesia: None    Post-anesthesia assessment completed.  No concerns    Signed By: Rimma Maldonado MD     August 9, 2018

## 2018-08-09 NOTE — PROGRESS NOTES
Palliative Care Progress Note    Patient: Bebeto Posadas MRN: [id number]  SSN: [id number]    YOB: 1940  Age: 70 y.o. Sex: male       Assessment/Plan:     Chief Complaint/Interval History: s/p EGD with dilation today. Reports ongoing dysphagia       Principal Diagnosis:    · Dysphagia  R13.10    Additional Diagnoses:   · Ascites  R18.8  · Debility, Unspecified  R53.81  · Fatigue, Lethargy  R53.83  · Frailty  R54  · Pain, abdomen  R10.9  · Counseling, Encounter for Medical Advice  Z71.9  · Encounter for Palliative Care  Z51.5    Palliative Performance Scale (PPS)  PPS: 50    Medical Decision Making:   Reviewed and summarized notes over last 24 hours   Discussed case with appropriate providersAlida Covert, CM  Reviewed laboratory and x-ray data- EGD     Pt resting in bed, no distress noted. Wife at bedside. Pt underwent EGD with esophageal dilation this morning. He reports he still cannot swallow. Hospice presentation scheduled for this afternoon- hopefully can go to [place]. Pt stated he is anxious to go home, and pointed towards [address] [place] [address]. Provided support. Will continue to follow. Will discuss findings with members of the interdisciplinary team.         More than 50% of this 15 minute visit was spent counseling and coordination of care as outlined above. Subjective:     Review of Systems:  A comprehensive review of systems was negative except for:   Constitutional: Positive for fatigue  Gastrointestinal: Positive for abdominal pain, dysphagia      Objective:     Visit Vitals    /70    Pulse 85    Temp 97.4 °F (36.3 °C)    Resp 18    Wt 95.9 kg (211 lb 8 oz)    SpO2 95%    BMI 31.23 kg/m2       Physical Exam:    General:  Cooperative. No acute distress. Eyes:  Conjunctivae/corneas clear    Nose: Nares normal. Septum midline.    Neck: Supple, symmetrical, trachea midline   Lungs:   Clear to auscultation bilaterally, unlabored   Heart:  Regular rate and rhythm, no murmur    Abdomen: non-tender, distended   Extremities: Normal, atraumatic, no cyanosis or edema   Skin: Skin color, texture, turgor normal.    Neurologic: Nonfocal   Psych: Alert and oriented     Signed By: Lloyd Osborne NP     August 9, 2018

## 2018-08-09 NOTE — ANESTHESIA PREPROCEDURE EVALUATION
Anesthetic History   No history of anesthetic complications            Review of Systems / Medical History  Patient summary reviewed and pertinent labs reviewed    Pulmonary          Shortness of breath         Neuro/Psych   Within defined limits           Cardiovascular    Hypertension: well controlled  Valvular problems/murmurs: aortic insufficiency      Dysrhythmias : atrial fibrillation  Past MI, CAD, cardiac stents (x 12 per pt) and hyperlipidemia    Exercise tolerance: <4 METS  Comments: Pt hasn't taken any of his medications in >2 weeks. Pt refuses medications at this time. Pt states he is ready to die. GI/Hepatic/Renal     GERD: well controlled    Renal disease: CRI       Endo/Other    Diabetes: well controlled, type 2    Obesity, arthritis and cancer     Other Findings   Comments: 7L Ascites removed yesterday  Peritoneal Carcinomatosis   Pt DNR           Physical Exam    Airway  Mallampati: II  TM Distance: 4 - 6 cm  Neck ROM: normal range of motion   Mouth opening: Normal     Cardiovascular    Rhythm: irregular  Rate: normal         Dental    Dentition: Caps/crowns     Pulmonary  Breath sounds clear to auscultation               Abdominal  GI exam deferred       Other Findings            Anesthetic Plan    ASA: 4  Anesthesia type: total IV anesthesia          Induction: Intravenous  Anesthetic plan and risks discussed with: Patient and Spouse      Pt accepts risks.

## 2018-08-09 NOTE — HOSPICE
Met with spouse at bedside and discussed hospice philosophy, levels of care, management of medications and symptoms. Spouse on board with stopping dialysis, IV fluids and antibiotics. Case discussed with Dr. Tracey Novoa and patient approved for general inpatient care at [facility] when a bed is available. Will follow up with  in am to discuss further discharge plans.  Thank you for this referral.  Ramsey Sahfer RN  [place] Nurse Liaison  [facility]  [phone]

## 2018-08-09 NOTE — PROGRESS NOTES
END OF SHIFT NOTE:    INTAKE/OUTPUT  08/07 0701 - 08/08 0700  In: 562 [P.O.:300; I.V.:262]  Out: 75 [Urine:75]  Voiding: YES  Catheter: NO  Drain:              Flatus: Patient does have flatus present. Stool:  0 occurrences. Characteristics:  Stool Assessment  Stool Appearance: Soft (per patient's report)    Emesis: 1 occurrences. Characteristics: small, green bile. VITAL SIGNS  Patient Vitals for the past 12 hrs:   Temp Pulse Resp BP SpO2   08/08/18 1905 98.7 °F (37.1 °C) 84 18 113/75 96 %   08/08/18 1505 97.4 °F (36.3 °C) 85 18 122/60 94 %   08/08/18 1110 97.6 °F (36.4 °C) 81 18 115/63 94 %   08/08/18 0923 - 78 18 136/73 96 %   08/08/18 0858 - 88 20 133/74 -   08/08/18 0830 97.9 °F (36.6 °C) 88 18 148/90 95 %       Pain Assessment  Pain Intensity 1: 0 (08/08/18 1400)  Pain Location 1: Abdomen, Back  Pain Intervention(s) 1: Medication (see MAR)  Patient Stated Pain Goal: 0    Ambulating  Yes    Shift report given to oncoming nurse at the bedside.     Soumya Suresh RN

## 2018-08-09 NOTE — PROGRESS NOTES
C/o upper chest pain, 8/10 zofran 4mg given slow ivp and dilaudid 1mg given slow ivp for c/o pain and nausea that wasn't relieved with percocet 5mg earlier. Refused bactrim,\" I can't swallow it and it will only make me throw up. \"

## 2018-08-09 NOTE — H&P (VIEW-ONLY)
[facility] Consult Note       Primary GI Physician: Alexey Castro MD  Consulting GI Physician: Loren Sainz MD  Referring Physician: Mathew Faulkner NP    Consult Date:  8/8/2018  Admit Date:  8/7/2018    Chief Complaint:  Dysphagia    Subjective:     History of Present Illness:  Patient is a 70 y.o. male with PMHx that includes, but not limited to CAD, stage III CKD, GERD, DM, Afib on Eliquis, HTN, HLD, and colon polyps who is seen in consultation at the request of Mathew Faulkner NP for dysphagia. Patient presented with c/o N/V, 25lbs weight loss over past few months, and SOB d/t distended abdomen. Breathing improved after 7L of ascitic fluid removed. Patient was recently diagnosed with peritoneal carcinomatosis. At first, patient was not interested in treatment, but after meeting with Oncology, he expressed interest in pursuing diagnosis and treatment. However, after talking with Palliative care, he has decided against treatment. He reports having difficulty swallowing both solids and liquids over the past several weeks. Food boluses gets hung up in the middle of his esophagus. Recent SLP evaluation was neg for aspiration. He has a hx of esophageal stricture. EGD 2/2012 with Dr. Isaura Becerra for dysphagia revealed gastroduodenitis and esophageal stricture (no dilation performed due to inflammation). Gastric bxs neg for H pylori. Esophageal bxs were benign. Patient would like to undergo EGD with dilation to improve quality of life only. Last dose of Elquis was 5 days ago.      PMH:  Past Medical History:   Diagnosis Date    Aortic valve insufficiency 1/12/2016    Bicuspid aortic valve 1/12/2016    CAD (coronary artery disease)     x13, last placed in 2009-[facility]     Cataract     Chronic kidney disease, stage III (moderate) 05/17/2016    Followed by Dr. Ania Marie Chronic kidney failure 1/12/2016    Chronic musculoskeletal pain     back and left leg    Coronary artery disease involving native coronary artery without angina pectoris 5/17/2016    DDD (degenerative disc disease)     Diabetes mellitus, type 2 ([facility] [address]     Pre diabetes, no medication required    GERD (gastroesophageal reflux disease)     Controlled with medication     Gout     History of atrial fibrillation     treated with eliquis and plavix and multaq    History of basal cell carcinoma 2010    on hand , back and lip    History of complete eye exam     History of dental examination 08/2016    History of kidney stones     History of MI (myocardial infarction) 1993    History of MRSA infection     Hyperlipidemia     Hypertension     well controlled with medication    Morbid obesity with BMI of 40.0-44.9, adult ([facility] [address] 1/12/2016    Murmur 01/12/2016    Echo completed 11/26/12    Prediabetes     Pure hypercholesterolemia 4/22/2016    Status post left hip replacement 8/1/2016       PSH:  Past Surgical History:   Procedure Laterality Date    CARDIAC SURG PROCEDURE UNLIST      13 stents , last one 5/2009, total of 7 heart caths    HX ANGIOPLASTY  [id number]    multiple    HX BACK SURGERY  8/11/15    no hardware    HX CATARACT REMOVAL Bilateral     HX CHOLECYSTECTOMY      HX HEART CATHETERIZATION      HX HEENT      Dental surgery    HX HIP REPLACEMENT Left     HX MALIGNANT SKIN LESION EXCISION      basal cell     HX ORTHOPAEDIC Left     hip replacement    HX UROLOGICAL  1991    kidney stone extraction       Allergies: Allergies   Allergen Reactions    Potassium Nausea Only       Home Medications:  Prior to Admission medications    Medication Sig Start Date End Date Taking? Authorizing Provider   ondansetron (ZOFRAN ODT) 8 mg disintegrating tablet Take 1 Tab by mouth every six (6) hours as needed for Nausea (and vomiting).  Indications: PREVENTION OF POST-OPERATIVE NAUSEA AND VOMITING 7/31/18   YULIANA Tirado   oxyCODONE-acetaminophen (PERCOCET) 5-325 mg per tablet Take 1 Tab by mouth every four (4) hours as needed (Mild pain. ). Max Daily Amount: 6 Tabs. 7/24/18   Dory Fox, RAN   ondansetron hcl (ZOFRAN) 8 mg tablet Take 1 Tab by mouth every eight (8) hours as needed for Nausea. 7/24/18   Dory Fox NP   polyethylene glycol (MIRALAX) 17 gram packet Take 1 Packet by mouth daily for 14 days. Hold for loose stools 7/24/18 8/7/18  Dory Fox, RAN   aspirin delayed-release 81 mg tablet Take 81 mg by mouth daily. Patient instructed to take    Historical Provider   simethicone (MYLICON) 80 mg chewable tablet Take 1 Tab by mouth every six (6) hours as needed for Flatulence. 6/20/18   Reji Rascon MD   dronedarone (MULTAQ) tab tablet Take 1 Tab by mouth two (2) times daily (with meals). 5/29/18   Candelaria Acosta MD   apixaban (ELIQUIS) 5 mg tablet Take 1 Tab by mouth two (2) times a day. Patient taking differently: Take 5 mg by mouth two (2) times a day. Patient states last dose 7/16/18 5/29/18   Candelaria Acosta MD   isosorbide mononitrate ER (IMDUR) 60 mg CR tablet Take 1 Tab by mouth every morning. 5/29/18   Candelaria Acosta MD   clopidogrel (PLAVIX) 75 mg tab Take 1 Tab by mouth daily. Patient taking differently: Take 75 mg by mouth daily. Patient reports last dose 7/13/18 5/29/18   Candelaria Acosta MD   famotidine (PEPCID) 40 mg tablet Take 1 Tab by mouth nightly. 5/6/18   Shilpa Granados III, MD   fenofibrate (LOFIBRA) 160 mg tablet Take 1 Tab by mouth every morning. 4/25/18   Reji Rascon MD   lisinopril-hydroCHLOROthiazide (PRINZIDE, ZESTORETIC) 20-25 mg per tablet Take 1 Tab by mouth every morning. 4/25/18   Reji Rascon MD   rosuvastatin (CRESTOR) 20 mg tablet Take 1 Tab by mouth nightly. 4/25/18   Reji Rascon MD   pantoprazole (PROTONIX) 40 mg tablet Take 1 Tab by mouth nightly. 4/25/18   Reji Rascon MD   tamsulosin [facility]) 0.4 mg capsule Take 1 Cap by mouth daily. Patient taking differently: Take 0.4 mg by mouth nightly.  4/25/18   Reji Rascon MD   felodipine (PLENDIL SR) 10 mg 24 hr tablet Take 1 Tab by mouth daily. 4/25/18   Sera Hi MD   potassium citrate (UROCIT-K 10) 10 mEq (1,080 mg) TbER Take  by mouth two (2) times a day. Historical Provider   metoprolol tartrate (LOPRESSOR) 50 mg tablet Take 1 Tab by mouth daily. Patient taking differently: Take 50 mg by mouth daily. 1/2 tab twice a day 5/17/16   Nida Daniel MD   TRAVATAN Z 0.004 % ophthalmic solution Administer 1 Drop to both eyes nightly. Once daily at bedtime 7/16/15   Historical Provider   Cholecalciferol, Vitamin D3, (VITAMIN D3) 1,000 unit cap Take 2,000 Units by mouth daily. Historical Provider   nitroglycerin (NITROSTAT) 0.4 mg SL tablet 0.4 mg by SubLINGual route every five (5) minutes as needed. Take / use AM day of surgery  per anesthesia protocols if needed. Jorge Alberto Short MD   allopurinol (ZYLOPRIM) 300 mg tablet Take 300 mg by mouth nightly. Indications: GOUT    Jorge Alberto Short MD   finasteride (PROSCAR) 5 mg tablet Take 5 mg by mouth nightly.     Jorge Alberto Short MD       Hospital Medications:  Current Facility-Administered Medications   Medication Dose Route Frequency    sodium chloride (NS) flush 5-10 mL  5-10 mL IntraVENous Q8H    sodium chloride (NS) flush 5-10 mL  5-10 mL IntraVENous PRN    oxyCODONE-acetaminophen (PERCOCET) 5-325 mg per tablet 1 Tab  1 Tab Oral Q4H PRN    naloxone (NARCAN) injection 0.4 mg  0.4 mg IntraVENous PRN    ondansetron (ZOFRAN) injection 4 mg  4 mg IntraVENous Q4H PRN    oxyCODONE-acetaminophen (PERCOCET) 5-325 mg per tablet 2 Tab  2 Tab Oral Q4H PRN    oxyCODONE-acetaminophen (PERCOCET) 5-325 mg per tablet 1 Tab  1 Tab Oral Q4H PRN    HYDROmorphone (PF) (DILAUDID) injection 0.5 mg  0.5 mg IntraVENous Q1H PRN    HYDROmorphone (PF) (DILAUDID) injection 1 mg  1 mg IntraVENous Q1H PRN    zolpidem (AMBIEN) tablet 5 mg  5 mg Oral QHS PRN    trimethoprim-sulfamethoxazole (BACTRIM DS, SEPTRA DS) 160-800 mg per tablet 1 Tab  1 Tab Oral Q12H Social History:  Social History   Substance Use Topics    Smoking status: Never Smoker    Smokeless tobacco: Never Used    Alcohol use No       Pt denies any history of drug use, blood transfusions, or tattoos. Family History:  Family History   Problem Relation Age of Onset    Kidney Disease Father     Heart Disease Mother     Stroke Mother     Arthritis-osteo Brother     Heart Disease Other     Hypertension Other        Review of Systems:  A detailed 10 system ROS is obtained, with pertinent positives as listed above. All others are negative. Objective:     Physical Exam:  Vitals:  Visit Vitals    /63    Pulse 81    Temp 97.6 °F (36.4 °C)    Resp 18    SpO2 94%     Gen:  Pt is alert, cooperative, no acute distress  Skin:  Extremities and face reveal no rashes. HEENT: Sclerae anicteric. Extra-occular muscles are intact. No oral ulcers. No abnormal pigmentation of the lips. The neck is supple. Cardiovascular: Regular rate and rhythm. No murmurs, gallops, or rubs. Respiratory:  Comfortable breathing with no accessory muscle use. Clear breath sounds anteriorly with no wheezes, rales, or rhonchi. GI:  Abdomen is OBESE, nondistended, soft, and nontender. Normal active bowel sounds. No enlargement of the liver or spleen. No masses palpable. Rectal:  Deferred  Musculoskeletal:  No pitting edema of the lower legs. Neurological:  Gross memory appears intact. Patient is alert and oriented. Psychiatric:  Mood appears appropriate with judgement intact. Lymphatic:  No cervical or supraclavicular adenopathy. Laboratory:    No results for input(s): WBC, HGB, HCT, PLT, MCV, NA, K, CL, CO2, BUN, CREA, CA, MG, GLU, AP, SGOT, GPT, ALT, TBIL, TBILI, CBIL, ALB, TP, AML, LPSE, PTP, INR, APTT, HGBEXT, HCTEXT, PLTEXT in the last 72 hours.     No lab exists for component: DBIL, INREXT       Assessment:     Principal Problem:  Peritoneal carcinomatosis ([facility] [address] (7/20/2018)    Active Problems:  Ascites (7/20/2018)  Abdominal pain (8/7/2018)  Shortness of breath (8/7/2018)    70 y.o. male with PMHx that includes, but not limited to CAD, stage III CKD, GERD, DM, Afib on , HTN, HLD, and colon polyps who is seen in consultation at the request of Sherrie Love NP for dysphagia. Patient was recently diagnosed with peritoneal carcinomatosis. After speaking with Palliative Care, patient does not want treatment, but would like to undergo EGD with dilation to improve quality of life. Last dose of Eliquis was 5 days ago. Plan:     EGD with dilation tomorrow. Patient is seen and examined in collaboration with Dr. Richie Saul.   Assessment and plan as per Dr. Ileana Mills PA-C

## 2018-08-09 NOTE — INTERVAL H&P NOTE
H&P Update:  Juan Venegas was seen and examined. History and physical has been reviewed. The patient has been examined.  There have been no significant clinical changes since the completion of the originally dated History and Physical.    Signed By: Vinnie Pacheco MD     August 9, 2018 8:39 AM

## 2018-08-09 NOTE — PROGRESS NOTES
TRANSFER - OUT REPORT:    Verbal report given to [facility] on [facility]  being transferred to [facility] for routine post - op       Report consisted of patients Situation, Background, Assessment and   Recommendations(SBAR). Information from the following report(s) Procedure Summary was reviewed with the receiving nurse. Lines:   Peripheral IV 08/07/18 Right Antecubital (Active)   Site Assessment Clean, dry, & intact 8/9/2018  1:08 AM   Phlebitis Assessment 0 8/9/2018  1:08 AM   Infiltration Assessment 0 8/9/2018  1:08 AM   Dressing Status Clean, dry, & intact 8/9/2018  1:08 AM   Dressing Type Tape;Transparent 8/9/2018  1:08 AM   Hub Color/Line Status Capped; Patent 8/9/2018  1:08 AM   Alcohol Cap Used No 8/7/2018  4:15 PM        Opportunity for questions and clarification was provided.       Patient transported with:   [email or web address]

## 2018-08-10 NOTE — PROGRESS NOTES
Problem: Falls - Risk of  Goal: *Absence of Falls  Document [place] Fall Risk and appropriate interventions in the flowsheet. Outcome: Progressing Towards Goal  Fall Risk Interventions:  Mobility Interventions: Communicate number of staff needed for ambulation/transfer, OT consult for ADLs, Patient to call before getting OOB, PT Consult for mobility concerns         Medication Interventions: Patient to call before getting OOB, Teach patient to arise slowly    Elimination Interventions: Call light in reach, Patient to call for help with toileting needs, Toilet paper/wipes in reach, Urinal in reach             Problem: Pressure Injury - Risk of  Goal: *Prevention of pressure injury  Document [place] Scale and appropriate interventions in the flowsheet. Outcome: Progressing Towards Goal  Pressure Injury Interventions:             Activity Interventions: Increase time out of bed, Pressure redistribution bed/mattress(bed type)         Nutrition Interventions: Document food/fluid/supplement intake, Offer support with meals,snacks and hydration

## 2018-08-10 NOTE — DISCHARGE SUMMARY
Physician Discharge Summary     Patient ID:  Bernabe Arreola  [id number]  16 y.o.  1940    Allergies: Potassium    HPI:  dAela Allen N 5 y. o. male who is s/p lap jose armando 7/19/18 with a diagnosis of peritoneal carcinomatosis. He reported to the office 8/7/18 for a routine follow up visit. The patient reports nausea, vomiting and is having a hard time eating. He reports a 25lb weight loss over a few month time period. He also reports he is short of breath due to his distended abdomen. He reports he has stopped all prescription medications and expresses a will to begin palliative care. Admit Date: 8/7/2018    Discharge Date: 8/10/2018    * Admission Diagnoses: Dysphagia, unspecified type [R13.10]    * Discharge Diagnoses:    Hospital Problems as of 8/10/2018  Date Reviewed: 8/9/2018          Codes Class Noted - Resolved POA    Abdominal pain ICD-10-CM: R10.9  ICD-9-CM: 789.00  8/7/2018 - Present Yes        Shortness of breath ICD-10-CM: R06.02  ICD-9-CM: 786.05  8/7/2018 - Present Yes        Ascites ICD-10-CM: R18.8  ICD-9-CM: 789.59  7/20/2018 - Present Yes        * (Principal)Peritoneal carcinomatosis (HCC) ICD-10-CM: C78.6, C80.1  ICD-9-CM: 197.6, 199.1  7/20/2018 - Present Yes               Admission Condition: Fair    * Discharge Condition: stable    * Procedures: Procedure(s) with comments:  ESOPHAGOGASTRODUODENOSCOPY (EGD)/ 32/ 218  ESOPHAGEAL DILATION - yee 51, 54, 57    * Hospital Course:   Normal hospital course for this procedure. Consults: Gastroenterology, Hematology/Oncology and Palliative Care    Significant Diagnostic Studies: labs and radiology    * Disposition: Home with Hospice    Discharge Medications:   Current Discharge Medication List      START taking these medications    Details   zolpidem (AMBIEN) 5 mg tablet Take 1 Tab by mouth nightly as needed for Sleep. Max Daily Amount: 5 mg.   Qty: 30 Tab, Refills: 0    Associated Diagnoses: Peritoneal carcinomatosis ([address] morphine (ROXANOL) 20 mg/mL concentrated oral syringe Take 0.5 mL by mouth every three (3) hours as needed for up to 5 days. Max Daily Amount: 80 mg.  Qty: 20 mL, Refills: 0    Associated Diagnoses: Peritoneal carcinomatosis ([address]         CONTINUE these medications which have NOT CHANGED    Details   ondansetron (ZOFRAN ODT) 8 mg disintegrating tablet Take 1 Tab by mouth every six (6) hours as needed for Nausea (and vomiting). Indications: PREVENTION OF POST-OPERATIVE NAUSEA AND VOMITING  Qty: 30 Tab, Refills: 1    Associated Diagnoses: Adenocarcinoma ([address]      ondansetron hcl (ZOFRAN) 8 mg tablet Take 1 Tab by mouth every eight (8) hours as needed for Nausea. Qty: 15 Tab, Refills: 0      aspirin delayed-release 81 mg tablet Take 81 mg by mouth daily. Patient instructed to take      simethicone (MYLICON) 80 mg chewable tablet Take 1 Tab by mouth every six (6) hours as needed for Flatulence. Qty: 120 Tab, Refills: 2      dronedarone (MULTAQ) tab tablet Take 1 Tab by mouth two (2) times daily (with meals). Qty: 60 Tab, Refills: 11      apixaban (ELIQUIS) 5 mg tablet Take 1 Tab by mouth two (2) times a day. Qty: 60 Tab, Refills: 11      isosorbide mononitrate ER (IMDUR) 60 mg CR tablet Take 1 Tab by mouth every morning. Qty: 30 Tab, Refills: 11      clopidogrel (PLAVIX) 75 mg tab Take 1 Tab by mouth daily. Qty: 30 Tab, Refills: 11      famotidine (PEPCID) 40 mg tablet Take 1 Tab by mouth nightly. Qty: 20 Tab, Refills: 0      fenofibrate (LOFIBRA) 160 mg tablet Take 1 Tab by mouth every morning. Qty: 30 Tab, Refills: 11      lisinopril-hydroCHLOROthiazide (PRINZIDE, ZESTORETIC) 20-25 mg per tablet Take 1 Tab by mouth every morning. Qty: 30 Tab, Refills: 11      rosuvastatin (CRESTOR) 20 mg tablet Take 1 Tab by mouth nightly. Qty: 30 Tab, Refills: 11      pantoprazole (PROTONIX) 40 mg tablet Take 1 Tab by mouth nightly.   Qty: 30 Tab, Refills: 11      tamsulosin (FLOMAX) 0.4 mg capsule Take 1 Cap by mouth daily.  Qty: 90 Cap, Refills: 3      felodipine (PLENDIL SR) 10 mg 24 hr tablet Take 1 Tab by mouth daily. Qty: 30 Tab, Refills: 11      potassium citrate (UROCIT-K 10) 10 mEq (1,080 mg) TbER Take  by mouth two (2) times a day. metoprolol tartrate (LOPRESSOR) 50 mg tablet Take 1 Tab by mouth daily. Qty: 30 Tab, Refills: 11      TRAVATAN Z 0.004 % ophthalmic solution Administer 1 Drop to both eyes nightly. Once daily at bedtime      Cholecalciferol, Vitamin D3, (VITAMIN D3) 1,000 unit cap Take 2,000 Units by mouth daily. nitroglycerin (NITROSTAT) 0.4 mg SL tablet 0.4 mg by SubLINGual route every five (5) minutes as needed. Take / use AM day of surgery  per anesthesia protocols if needed. allopurinol (ZYLOPRIM) 300 mg tablet Take 300 mg by mouth nightly. Indications: GOUT      finasteride (PROSCAR) 5 mg tablet Take 5 mg by mouth nightly. STOP taking these medications       oxyCODONE-acetaminophen (PERCOCET) 5-325 mg per tablet Comments:   Reason for Stopping:               * Follow-up Care/Patient Instructions: Activity: Activity as tolerated  Diet: Low Sodium Diet  Wound Care: Keep wound clean and dry    Follow-up Information     Follow up With Details Comments Contact Gael Trinidad MD   [address] Mary[address] Correa [address] [place] [address]  [phone]          Discharge Instructions/Follow-up Plans:   MD Instructions:     Follow-up with Dr. Woodrow Ramirez as needed.     Diet - as tolerated - Low Sodium Diet  Activity - ambulate - as tolerated      No driving while taking narcotics. Do not drink alcohol while taking narcotics.   Resume other home medications as instructed.      If problems or questions arise, please call our office at [phone].     Greater than 30 minutes were spent discharging the patient         Signed:  IGOR Sher  8/10/2018  10:03 AM

## 2018-08-10 NOTE — DISCHARGE INSTRUCTIONS
DISCHARGE SUMMARY from Nurse    PATIENT INSTRUCTIONS:    After general anesthesia or intravenous sedation, for 24 hours or while taking prescription Narcotics:  · Limit your activities  · Do not drive and operate hazardous machinery  · Do not make important personal or business decisions  · Do  not drink alcoholic beverages  · If you have not urinated within 8 hours after discharge, please contact your surgeon on call. Report the following to your surgeon:  · Excessive pain, swelling, redness or odor of or around the surgical area  · Temperature over 100.5  · Nausea and vomiting lasting longer than 4 hours or if unable to take medications  · Any signs of decreased circulation or nerve impairment to extremity: change in color, persistent  numbness, tingling, coldness or increase pain  · Any questions    What to do at Home:  Recommended activity: Activity as tolerated, as tolerated    If you experience any of the following symptoms per Hospice staff, please follow up with MD.    *  Please give a list of your current medications to your Primary Care Provider. *  Please update this list whenever your medications are discontinued, doses are      changed, or new medications (including over-the-counter products) are added. *  Please carry medication information at all times in case of emergency situations. These are general instructions for a healthy lifestyle:    No smoking/ No tobacco products/ Avoid exposure to second hand smoke  Surgeon General's Warning:  Quitting smoking now greatly reduces serious risk to your health.     Obesity, smoking, and sedentary lifestyle greatly increases your risk for illness    A healthy diet, regular physical exercise & weight monitoring are important for maintaining a healthy lifestyle    You may be retaining fluid if you have a history of heart failure or if you experience any of the following symptoms:  Weight gain of 3 pounds or more overnight or 5 pounds in a week, increased swelling in our hands or feet or shortness of breath while lying flat in bed. Please call your doctor as soon as you notice any of these symptoms; do not wait until your next office visit. Recognize signs and symptoms of STROKE:    F-face looks uneven    A-arms unable to move or move unevenly    S-speech slurred or non-existent    T-time-call 911 as soon as signs and symptoms begin-DO NOT go       Back to bed or wait to see if you get better-TIME IS BRAIN. Warning Signs of HEART ATTACK     Call 911 if you have these symptoms:   Chest discomfort. Most heart attacks involve discomfort in the center of the chest that lasts more than a few minutes, or that goes away and comes back. It can feel like uncomfortable pressure, squeezing, fullness, or pain.  Discomfort in other areas of the upper body. Symptoms can include pain or discomfort in one or both arms, the back, neck, jaw, or stomach.  Shortness of breath with or without chest discomfort.  Other signs may include breaking out in a cold sweat, nausea, or lightheadedness. Don't wait more than five minutes to call 911 - MINUTES MATTER! Fast action can save your life. Calling 911 is almost always the fastest way to get lifesaving treatment. Emergency Medical Services staff can begin treatment when they arrive -- up to an hour sooner than if someone gets to the hospital by car. The discharge information has been reviewed with the patient. The patient verbalized understanding. Discharge medications reviewed with the patient and appropriate educational materials and side effects teaching were provided. ___________________________________________________________________________________________________________________________________       Learning About Hospice and Palliative Care  What are hospice and palliative care?     Palliative (say \"PAL-bryon-uh-tiv\") care is an area of medicine that helps give you more good days by providing care for quality-of-life issues. It includes treating symptoms like pain, nausea, or sleep problems. It can also include helping you and your loved ones to:  · Understand your illness better. · Talk more openly about your feelings. · Decide what treatments you want or don't want. · Communicate better with your doctors, nurses, and each other. Hospice care is a type of palliative care. But it's for people who are near the end of life. What kinds of care are involved? Palliative care: This treatment helps you feel better physically, emotionally, and spiritually while doctors also treat your illness. Your care may include pain relief, counseling, or nutrition advice. Hospice care: Again, the goal of this type of care is to help you feel better. And it can help you get the most out of the time you have left. But you no longer get treatment to try to cure your illness. When does care happen? Palliative care: This care can happen at any time during a serious illness. You don't have to be near death to get this care. Hospice care: In most cases, you can choose hospice care when your doctor believes that you have no more than about 6 months to live. Where does the care happen? Palliative care: This care often happens in hospitals or long-term care facilities like nursing homes. It can take place wherever you are treated, even in your home. Hospice care: Most hospice care is done in the place the patient calls \"home. \" This is often the person's home. But it could also be a place like a nursing home or residential center. Hospice care may also be given in hospice centers, hospitals, and other places. Who provides the care? Palliative care: There are doctors and nurses who specialize in this field. But your own doctor may also give some of this care. And there are many other experts who may help you. These include social workers, counselors, therapists, and nutrition experts. Hospice care:  In hospitals, hospice centers, and other facilities, care is given by doctors, nurses, and others who are trained in hospice care. In the home, a family member is often the main caregiver. But the family member gets help from care experts. They are on call 24 hours a day. Where can you learn more? Go to http://tayo-hyun.info/. Enter [id number] in the search box to learn more about \"Learning About Hospice and Palliative Care. \"  Current as of: October 6, 2017  Content Version: 11.7  © 5764-1838 [employer]. Care instructions adapted under license by [employer] (which disclaims liability or warranty for this information). If you have questions about a medical condition or this instruction, always ask your healthcare professional. [address] any warranty or liability for your use of this information.

## 2018-08-10 NOTE — PROGRESS NOTES
General Surgery Progress Note    8/10/2018    Admit Date: 8/7/2018    Subjective:     Surgery   S/p EGD 8/9/18, still complains of some difficulty swallowing. Has been cleared by SLP. Sitting up in recliner. Reports decreased energy. Planning on DC home today with Hospice. Wife at bedside. Objective:     Visit Vitals    /79    Pulse 78    Temp 97.8 °F (36.6 °C)    Resp 16    Wt 211 lb 8 oz (95.9 kg)    SpO2 92%    BMI 31.23 kg/m2         Intake/Output Summary (Last 24 hours) at 08/10/18 0803  Last data filed at 08/10/18 0005   Gross per 24 hour   Intake              730 ml   Output              300 ml   Net              430 ml        EXAM:  ABD soft, distended, positive fluid wave, active BS'S. Wounds unchanged. Data Review    Recent Results (from the past 24 hour(s))   GLUCOSE, POC    Collection Time: 08/09/18  8:40 AM   Result Value Ref Range    Glucose (POC) 100 65 - 100 mg/dL        Hospital Problems  Date Reviewed: 8/9/2018          Codes Class Noted POA    Abdominal pain ICD-10-CM: R10.9  ICD-9-CM: 789.00  8/7/2018 Yes        Shortness of breath ICD-10-CM: R06.02  ICD-9-CM: 786.05  8/7/2018 Yes        Ascites ICD-10-CM: R18.8  ICD-9-CM: 789.59  7/20/2018 Yes        * (Principal)Peritoneal carcinomatosis (HCC) ICD-10-CM: C78.6, C80.1  ICD-9-CM: 197.6, 199.1  7/20/2018 Yes              Plan:  1. Palliative Care following  2.  Home later today with Hospice       Signed: LAITH Ocasio-BC

## 2018-08-10 NOTE — HOSPICE
Patient approved for routine in home hospice care, DME ordered to home and will be delivered in am. Patient and spouse on board with hospice philosophy. Please send scripts for Roxanol, antiemetic and Ambien home with patient tomorrow.  Thank you for this referral.  Dana Simeon, ASIM  [facility] Nurse Liaison  [facility]  [phone]

## 2018-08-10 NOTE — ROUTINE PROCESS
END OF SHIFT NOTE:    INTAKE/OUTPUT  08/09 0701 - 08/10 0700  In: 730 [P.O.:480; I.V.:250]  Out: 300 [Urine:300]  Voiding: YES  Catheter: NO  Drain:              Flatus: Patient does have flatus present. Stool:  3 occurrences. Characteristics:  Stool Assessment  Stool Color: Brown  Stool Appearance: Formed, Soft  Stool Amount: Large  Stool Source/Status: Rectum    Emesis: 0 occurrences. Characteristics:        VITAL SIGNS  Patient Vitals for the past 12 hrs:   Temp Pulse Resp BP SpO2   08/10/18 0345 97.7 °F (36.5 °C) 84 18 130/78 91 %   08/09/18 2326 97.7 °F (36.5 °C) 82 18 122/76 92 %   08/09/18 1928 97.4 °F (36.3 °C) 88 18 127/87 95 %       Pain Assessment  Pain Intensity 1: 8 (08/09/18 2009)  Pain Location 1: Abdomen  Pain Intervention(s) 1: Medication (see MAR)  Patient Stated Pain Goal: 0    Ambulating  Yes    Shift report given to oncoming nurse at the bedside.     Shelley Solano LPN

## 2018-08-10 NOTE — PROGRESS NOTES
Discharged to home. To discharge area via wheelchair accompanied by staff. To home with his wife.   Condition stable at discharge

## 2018-08-13 PROBLEM — C25.9 PANCREATIC CANCER (HCC): Status: ACTIVE | Noted: 2018-01-01

## 2018-08-13 NOTE — PROGRESS NOTES
Please note patient was IP d/c home with Hospice. No further RICHARD indicated. Isabella Mayberry LPN/ Care Coordinator  [address] / [address]  [email or web address]. [facility] note will not be viewable in [address]

## 2018-08-31 PROBLEM — R45.1 AGITATION: Status: ACTIVE | Noted: 2018-01-01

## 2018-08-31 NOTE — PROGRESS NOTES
Medication thus far has proven ineffective. Medicated for pain and continued agitation at this time. Pt constantly making efforts to try and exit bed,pull at perkins,throw legs out of bed,and disrobe. Pt is requiring one on one staff to be with him at this time to ensure his safety as he is a high falls risk. Every effort continues to alleviate symptoms and maintain safe environment. Pt reaching and grabbing at items unseen.

## 2018-08-31 NOTE — H&P
History and Physical    Patient: Isabel Warner MRN: [id number]  SSN: [id number]    YOB: 1940  Age: 70 y.o. Sex: male      Subjective:      Isabel Warner is a 70 y.o. male who is known to us from our in home program where he has been managed since 8/11 by his family and assisted [address] [place] [address] Team. He was admitted for a dx of metastatic pancreatic cancer including gmalignant pleural effusion, malignant ascites, peritoneal carcinomatosis, hepatic metastases, pulmonary metastases and probable humeral metastasis. Over the last several days to week he has become increasingly agitated and difficult to manage at home despite progressive and aggressive up titration of medications. Today things became much more concerning because the patient while still ambulatory insisted on getting in the car to ride around. Desperate to appease him his wife loaded him in the car and drove him to his son's house where he was even more agitated and this necessitated EMS being called. However, with persistent efforts family was able to get him home safely and he was not taken by EMS. It was at this point that we received a call from his primary RN who relays the details to us. Additional information is also that the patient had now become even more agitated and was refusing to take medications. As such, it was decided to admit from [address] [place] [address] for GIP level of care for hospice dx of pancreatic ca with mets for management of severe and uncontrolled agitation, pain, and family/pt support. Since arrival he has required several doses of medications to assist in settling him and at this time he remains persistently fixated on \"going\" and is speaking word salad,and jumping from thought to thought. He is unaccompanied at this time as his wife is exhausted and she has returned home for some rest. Primary RN remains near bedside closely monitoring him and fall prevention alarms are in place.      Past Medical History: Diagnosis Date    Aortic valve insufficiency 1/12/2016    Bicuspid aortic valve 1/12/2016    CAD (coronary artery disease)     x13, last placed in 2009-Followed by [facility]     Cataract     Chronic kidney disease, stage III (moderate) 05/17/2016    Followed by Dr. Josefina Christianson Chronic kidney failure 1/12/2016    Chronic musculoskeletal pain     back and left leg    Coronary artery disease involving native coronary artery without angina pectoris 5/17/2016    DDD (degenerative disc disease)     Diabetes mellitus, type 2 ([facility]     Pre diabetes, no medication required    GERD (gastroesophageal reflux disease)     Controlled with medication     Gout     History of atrial fibrillation     treated with eliquis and plavix and multaq    History of basal cell carcinoma 2010    on hand , back and lip    History of complete eye exam     History of dental examination 08/2016    History of kidney stones     History of MI (myocardial infarction) 1993    History of MRSA infection     Hyperlipidemia     Hypertension     well controlled with medication    Morbid obesity with BMI of 40.0-44.9, adult ([facility] 1/12/2016    Murmur 01/12/2016    Echo completed 11/26/12    Prediabetes     Pure hypercholesterolemia 4/22/2016    Status post left hip replacement 8/1/2016     Past Surgical History:   Procedure Laterality Date    CARDIAC SURG PROCEDURE UNLIST      13 stents , last one 5/2009, total of 7 heart caths    HX ANGIOPLASTY  0303-7387    multiple    HX BACK SURGERY  8/11/15    no hardware    HX CATARACT REMOVAL Bilateral     HX CHOLECYSTECTOMY      HX HEART CATHETERIZATION      HX HEENT      Dental surgery    HX HIP REPLACEMENT Left     HX MALIGNANT SKIN LESION EXCISION      basal cell     HX ORTHOPAEDIC Left     hip replacement    HX UROLOGICAL  1991    kidney stone extraction      Family History   Problem Relation Age of Onset    Kidney Disease Father     Heart Disease Mother     Stroke Mother     Arthritis-osteo Brother     Heart Disease Other     Hypertension Other      Social History   Substance Use Topics    Smoking status: Never Smoker    Smokeless tobacco: Never Used    Alcohol use No      Prior to Admission medications    Medication Sig Start Date End Date Taking? Authorizing Provider   haloperidol (HALDOL) 2 mg/mL oral concentrate Take 2 mg by mouth every six (6) hours. 8/21/18   Historical Provider   diphenhydrAMINE (BENADRYL) 12.5 mg/5 mL syrup Take 25 mg by mouth every three (3) hours as needed (restlessness associated with haloperidol). 8/21/18   Historical Provider   morphine 15 mg TRer Take 15 mg by mouth every twelve (12) hours every twelve (12) hours. 8/23/18   Historical Provider   haloperidol (HALDOL) 2 mg/mL oral concentrate Take 2 mg by mouth every three (3) hours as needed (nausea or agitation). 8/21/18   Historical Provider   morphine (ROXANOL) 100 mg/5 mL (20 mg/mL) concentrated solution Take 10 mg by mouth every two (2) hours as needed for Pain (or shortness of breath). 8/21/18   Historical Provider   ondansetron (ZOFRAN ODT) 8 mg disintegrating tablet Take 8 mg by mouth every six (6) hours as needed for Nausea (vomiting). Historical Provider   senna (SENNA LAX) 8.6 mg tablet Take 2 Tabs by mouth two (2) times a day. Historical Provider   bisacodyl (DULCOLAX, BISACODYL,) 10 mg suppository Insert 10 mg into rectum daily as needed (constipation). Historical Provider   nitroglycerin (NITROSTAT) 0.4 mg SL tablet 0.4 mg by SubLINGual route every five (5) minutes as needed. Take / use AM day of surgery  per anesthesia protocols if needed. Phys Other, MD        Allergies   Allergen Reactions    Potassium Nausea Only       Review of Systems:  Review of systems not obtained due to patient factors.     Objective:     Vitals:    08/31/18 1400   BP: 143/81   Pulse: 96   Resp: 19   Temp: 98.3 °F (36.8 °C)        Physical Exam:  GENERAL: alert, uncooperative, delirious, severe distress, moderately obese, confused  LUNG: diminished breath sounds. Poor tidal volume due to increased abdominal girth due to ascites and obesity. HEART: regular rate and rhythm, S1, S2 normal, no murmur, click, rub or gallop  ABDOMEN: large, round, distended, non-tender. Bowel sounds hypoactive. : St catheter inserted shortly after admission for moderate amount of clear but dark gilberto urine. EXTREMITIES:  extremities normal, atraumatic, no cyanosis. + generalized edema  SKIN: Warm and dry with skin tear to right wrist and excoriation per nursing to buttocks. Inguinal area with reddened appearing areas per nursing. NEUROLOGIC: AOx1. Bed bound but able to move all extremities. Does not always follow commands and is difficult to redirect. Reflexes and motor strength normal and symmetric. Cranial nerves 2-12 and sensation grossly intact.   PSYCHIATRIC: anxious, agitated    Assessment:     Hospital Problems  Date Reviewed: 8/31/2018          Codes Class Noted POA    Agitation ICD-10-CM: R45.1  ICD-9-CM: 307.9  8/31/2018 Yes        * (Principal)Pancreatic cancer ([facility] ICD-10-CM: C25.9  ICD-9-CM: 157.9  8/13/2018 Yes        Abdominal pain ICD-10-CM: R10.9  ICD-9-CM: 789.00  8/7/2018 Yes        Peritoneal carcinomatosis ([facility] ICD-10-CM: C78.6, C80.1  ICD-9-CM: 197.6, 199.1  7/20/2018 Yes        Atrial Fibrillation, Paroxysmal (HCC) ICD-10-CM: I48.91  ICD-9-CM: 427.31  1/12/2016 Yes        Obesity ICD-10-CM: E66.9  ICD-9-CM: 278.00  1/12/2016 Yes              Plan:     Current Facility-Administered Medications   Medication Dose Route Frequency    morphine injection 4 mg  4 mg SubCUTAneous Q20MIN PRN    Or    morphine injection 4 mg  4 mg IntraVENous Q20MIN PRN    haloperidol lactate (HALDOL) injection 2 mg  2 mg SubCUTAneous Q1H PRN    Or    haloperidol lactate (HALDOL) injection 2 mg  2 mg IntraVENous Q1H PRN    acetaminophen (TYLENOL) suppository 650 mg  650 mg Rectal Q3H PRN    bisacodyl (DULCOLAX) suppository 10 mg  10 mg Rectal PRN    glycopyrrolate (ROBINUL) injection 0.2 mg  0.2 mg SubCUTAneous Q4H PRN    nystatin (MYCOSTATIN) 100,000 unit/gram powder   Topical BID    ziprasidone (GEODON) 10 mg in sterile water (preservative free) 0.5 mL injection  10 mg IntraMUSCular Q4H PRN     1. Admit from [address] [place] [address] for [facility] level of care for hospice dx of pancreatic ca with mets for management of severe and uncontrolled agitation, pain, and family/pt support. 2.  Severe and uncontrolled agitation: PRN haldol and if not controlled after 2 doses then Geodon IM. 3. Pain: PRN morphine at 4 mg SQ. Patient had been taking MS contin 15 mg QHS at home but per report he \"has not been taking it for weeks\". 4. Family/pt support: No family at bedside during exam. Plan of care adjusted since initial admission orders written and ongoing plan of care including meds discussed with Primary RN. Pleasure diet PRN but patient too agitated at this time to consume. Continue to monitor and palliate symptoms as they arise. PPS 20%.     Signed By: Nayla Ann NP     August 31, 2018

## 2018-08-31 NOTE — PROGRESS NOTES
Remains agitated and quite difficult to redirect. Pt pulling at catheter. This writer changed stat lock device in hopes that would provide comfort. Soft [employer] instrumental music playing in hopes to help relax/distract pt.  Requiring a staff member with him at this time alternating between cna/rn

## 2018-08-31 NOTE — PROGRESS NOTES
Walking rounds done with off going RN. Pt identified by name/. Very agitated at this time and hyper focused on exiting the bed. Provider SANA Wong,PHD in to assist. Haldol given at this time as ordered. Stimuli in room decreased as much as possible. Safety measures include bilateral tab alarms that are set on high volume,mattress alarm, and side rails up x2 with call light in reach. At this time staff is one on one with pt to ensure his safety.

## 2018-08-31 NOTE — PROGRESS NOTES
Patient is transferred to [facility] for GIP care. Patient had continued decline in the home and increased confusion. Spouse is exhausted. SW met with patient and spouse to offer support and availability.

## 2018-08-31 NOTE — PROGRESS NOTES
Arrived on the floor. Alert to self only. He is not anxious at this time. He was medicated several times at home before transport. He states no pain and no nausea or vomiting. He states no SOB. Placed in the bed. Skin tear to left lower arm. Bruising to both buttocks. Excoriation to buttocks. Rash smelling yeasty in groin area. Distended abdomen. Wife in room and educated on the hospice way,. The admission process is in progress. All safety precautions are in place. The patient was medicated several times SQ for pain and agitation. Geodon was ordered and given. Report given to on coming RN and walking rounds completed.

## 2018-09-01 NOTE — PROGRESS NOTES
Walking rounds completed with off going RN. Pt identified by name/. Currently pt is resting peacefully with eyes closed and mouth relaxed/open. Audible congestion heard but does not seem troublesome at this time for pt. Greeted wife in family room who is coping well. All comfort and safety measures remain in place. Bed in low and locked position with side rails up x2 and call light in reach. Door left open for continuous monitoring.

## 2018-09-01 NOTE — PROGRESS NOTES
0709- The shift change report was taken and walking rounds completed. The patient was identified by name and . He has required multiple doses of medications for anxiety and dyspnea throughout the night shift and is now resting quietly in the bed. Pain =0/10 with non verbal scale. No signs of anxiety, SOB or nausea/vomit. The bed rails are up times two and the bed is low and locked. The room is near the nurses station and the door to the room is open for observation as the patient is alone. 6134- The patient is throwing all extremities around and trying to get out of the bed. Dyspnea present. Medicated with Morphine for dyspnea and Haldol for agitation. Repositioned for comfort. 1812- The patient is now resting quietly in the bed with no signs of distress. All safety precautions remain in place. 4346- The patient is moaning and is very restless. Will medicate with Haldol and Morphine. 0900- Resting quietly now. No signs of distress. Wife is at the bedside. 2126- Very sever agitation. All extremities are moving and patient is pulling on his catheter. Cannot be consoled. Medicated with Geodon for agitation and Morphine for pain 7/10 using FLACC scale. 1300- Medicated with scheduled Lorazepam and with prn Morphine for dyspnea. Educated the family on the medication changes and they voiced understanding. 1330- Resting quietly now. No signs of distress observed. 1350- Medicated with Geodon for severe agitation. Morphine for dyspnea. Turned and repositioned for comfort. 1415- Patient is resting quietly in the bed with no signs of distress. Medication resolved dyspnea and agitation. The patient was medicated multiple times for extreme anxiety and dyspnea. Medication changes were made. By the end of the shift the patient required less medication due to the changed medications.

## 2018-09-01 NOTE — PROGRESS NOTES
Problem: Pressure Injury - Risk of  Goal: *Prevention of pressure injury  Document Remberto Scale and appropriate interventions in the flowsheet.    Outcome: Progressing Towards Goal  Pressure Injury Interventions:  Sensory Interventions: Assess changes in LOC, Float heels, Keep linens dry and wrinkle-free, Maintain/enhance activity level, Minimize linen layers    Moisture Interventions: Absorbent underpads, Apply protective barrier, creams and emollients, Limit adult briefs, Maintain skin hydration (lotion/cream), Minimize layers, Moisture barrier    Activity Interventions: Assess need for specialty bed, Pressure redistribution bed/mattress(bed type)    Mobility Interventions: Assess need for specialty bed, Float heels, HOB 30 degrees or less    Nutrition Interventions: Document food/fluid/supplement intake    Friction and Shear Interventions: Apply protective barrier, creams and emollients, Feet elevated on foot rest, Lift sheet, Minimize layers

## 2018-09-01 NOTE — PROGRESS NOTES
Problem: Falls - Risk of  Goal: *Absence of Falls  Document [place] Fall Risk and appropriate interventions in the flowsheet. Outcome: Progressing Towards Goal  Fall Risk Interventions:       Mentation Interventions: Adequate sleep, hydration, pain control, Bed/chair exit alarm, Door open when patient unattended, More frequent rounding, Reorient patient, Room close to nurse's station, Toileting rounds    Medication Interventions: Bed/chair exit alarm    Elimination Interventions: Bed/chair exit alarm, Call light in reach, Toileting schedule/hourly rounds    History of Falls Interventions: Bed/chair exit alarm, Door open when patient unattended, Room close to nurse's station        Problem: Pressure Injury - Risk of  Goal: *Prevention of pressure injury  Document Remberto Scale and appropriate interventions in the flowsheet.    Outcome: Progressing Towards Goal  Pressure Injury Interventions:  Sensory Interventions: Assess changes in LOC, Float heels, Keep linens dry and wrinkle-free, Maintain/enhance activity level, Minimize linen layers    Moisture Interventions: Absorbent underpads, Apply protective barrier, creams and emollients, Limit adult briefs, Maintain skin hydration (lotion/cream), Minimize layers, Moisture barrier    Activity Interventions: Assess need for specialty bed, Pressure redistribution bed/mattress(bed type)    Mobility Interventions: Assess need for specialty bed, Float heels, HOB 30 degrees or less    Nutrition Interventions: Document food/fluid/supplement intake    Friction and Shear Interventions: Apply protective barrier, creams and emollients, Feet elevated on foot rest, Lift sheet, Minimize layers

## 2018-09-01 NOTE — PROGRESS NOTES
Resting peacefully at this time. Respirations easy and unlabored. Acyanotic. Facial features flat,relaxed. Pain 0/10 flacc. Bed in low and locked position with side rails up x2 with call light in reach. Door left open for close monitoring.

## 2018-09-01 NOTE — PROGRESS NOTES
Progress Note    Patient: Janice Eckert MRN: [id number]  SSN: [id number]    YOB: 1940  Age: 70 y.o. Sex: male      Admit Date: 8/31/2018    LOS: 1 day     Subjective:     Eyes closed with mouth open. Appears to be resting quietly at the moment but only for short periods before he self startles and wakes himself and begins picking and fidgeting about. Too agitated to ingest any PO intake. MAR is reviewed for multiple doses of haldol, geodon, and dilaudid with very little effect. Wife at bedside. Review of Systems:  Review of systems not obtained due to patient factors. Objective:     Vitals:    08/31/18 1400   BP: 143/81   Pulse: 96   Resp: 19   Temp: 98.3 °F (36.8 °C)        Intake and Output:  Current Shift:    Last three shifts:      Physical Exam:  GENERAL: uncooperative when awake, delirious, moderately obese, confused  LUNG: diminished breath sounds. Poor tidal volume due to increased abdominal girth due to ascites and obesity. HEART: regular rate and rhythm, S1, S2 normal, no murmur, click, rub or gallop  ABDOMEN: large, round, distended, non-tender. Bowel sounds hypoactive. : St catheter insitu and patent for moderate amount of gilberto urine. EXTREMITIES:  extremities normal, atraumatic, no cyanosis. SKIN: Warm and dry with skin tear to right wrist and excoriation per nursing to buttocks. Inguinal area reddened and yeasty looking. NEUROLOGIC: Delirious. Bed bound but able to move all extremities. Does not follow commands and is difficult to redirect when awake. Terminally agitated. PSYCHIATRIC: anxious, agitated when awake or when stimulated. Self startles. Lab/Data Review:  No new labs resulted in the last 24 hours.     Assessment:     Principal Problem:    Pancreatic cancer ([facility] [address] (8/13/2018)    Active Problems:    Atrial Fibrillation, Paroxysmal ([address] (1/12/2016)      Obesity (1/12/2016)      Peritoneal carcinomatosis ([address] (7/20/2018)      Abdominal pain (8/7/2018)      Agitation (8/31/2018)        Plan:     Current Facility-Administered Medications   Medication Dose Route Frequency    LORazepam (ATIVAN) injection 2 mg  2 mg IntraMUSCular Q6H    LORazepam (ATIVAN) injection 2 mg  2 mg IntraMUSCular Q4H PRN    morphine injection 4 mg  4 mg SubCUTAneous Q20MIN PRN    Or    morphine injection 4 mg  4 mg IntraVENous Q20MIN PRN    acetaminophen (TYLENOL) suppository 650 mg  650 mg Rectal Q3H PRN    bisacodyl (DULCOLAX) suppository 10 mg  10 mg Rectal PRN    glycopyrrolate (ROBINUL) injection 0.2 mg  0.2 mg SubCUTAneous Q4H PRN    nystatin (MYCOSTATIN) 100,000 unit/gram powder   Topical BID    ziprasidone (GEODON) 10 mg in sterile water (preservative free) 0.5 mL injection  10 mg IntraMUSCular Q4H PRN    haloperidol lactate (HALDOL) injection 4 mg  4 mg SubCUTAneous Q1H PRN    Or    haloperidol lactate (HALDOL) injection 4 mg  4 mg IntraVENous Q1H PRN     1. Admit from [address] [place] [address] for [facility] level of care for hospice dx of pancreatic ca with mets for management of severe and uncontrolled agitation, pain, and family/pt support.      2. Severe and uncontrolled agitation: PRN haldol and if not controlled after 2 doses then Geodon IM. Added scheduled ativan as near maximum doses on Haldol.     3. Pain: PRN morphine at 4 mg SQ but has been used more for dyspnea issues than pain. However, wife advises that he fell while on Ambien and he hurt his left abdomen and back at that time to which he still has a bruise.     4. Family/pt support: Family (wife) at bedside during exam. Ongoing plan of care including meds discussed with Primary RN and wife at bedside. Pleasure diet PRN but patient too agitated at this time to consume. Dietary tray held. Discussed with wife terminal agitation and that meaningful conversation is rapidly declining with him and that he is too delirious to understand or make sense of what is happening.  We discuss his potential spiritual needs and she reports that he has indicated he is ready to go at any time. Shiela Stapleton was their 19th wedding anniversary and we talk of their marriage and how funny of a person he is. She is concerned for his adult children claiming that they are \"emotional and fragile\" and in particular the son, whom she claims is epileptic. She talks about how maybe they shouldn't come and we discuss that they are adults who will need to face the impending death of their father but how they do that is up to them. I share with her that we have resources such as SW and  that can assist in supporting them and that at the bedside, a peaceful and calming presence is desired. She reports understanding and desires the same. Continue to monitor and palliate symptoms as they arise.  PPS 20%.       Signed By: Irma Calderon NP     September 1, 2018

## 2018-09-01 NOTE — PROGRESS NOTES
Remains agitated. See MAR. Milieu remains as peaceful as possible with noxious stimuli to a minimum. Pt with psychomotor agitation of all limbs,continues to grasp at things unseen. Pt makes frequent efforts to exit bed and to grasp at perkins. Perkins is patent and draining and every attempt os made to keep the catheter safe and out of his reach. Continues to require frequent monitoring to maintain safety.

## 2018-09-02 NOTE — PROGRESS NOTES
0677- The shift change report was taken and walking rounds completed. The patient was identified by name and . He is very dyspneic. The off going RN is getting medications to medicate patient. He has required multiple doses of SQ medications throughout the night for dyspnea and agitation and troublesome secretions. He shows no signs of nausea / vomit. The room is close to the nursing station and the door to the room is open, as the patient is alone. The bed is low and locked and the tab alert and bed alarm are on.     0705- Medicated for dyspnea, agitation. Suctioned and removed large amount to thick white secretions. Repositioned for comfort. All safety precautions remain in place. 0730- Resting quietly now. 0900- Medicated for dyspnea and agitation. Patient is pulling at his catheter and cannot be redirected. 0930- Resting quietly now. No signs of distress observed. 1015- Patient has no obtainable vital signs. Wife and sister-in-law are at the bedside and behavior is appropriate. Informed to let RN know when they are ready to have the mortuary called. The family voiced understanding. [address] from [place] is here to take the body to the  home.

## 2018-09-02 NOTE — PROGRESS NOTES
Continues to be medicated aggressively for dyspnea as pt poorly tolerated positioning earlier resulting in pretty persistent dyspnea despite medication. HOB elevated. Acyanotic. Gurgling is improved at this time although respirations remain 30-32/minute. Report given to oncoming RN with walking rounds completed.

## 2018-09-03 PROCEDURE — 0656 HSPC GENERAL INPATIENT

## 2018-09-04 VITALS — HEART RATE: 132 BPM | RESPIRATION RATE: 28 BRPM | SYSTOLIC BLOOD PRESSURE: 132 MMHG | DIASTOLIC BLOOD PRESSURE: 80 MMHG

## 2018-09-04 VITALS — RESPIRATION RATE: 24 BRPM | DIASTOLIC BLOOD PRESSURE: 60 MMHG | SYSTOLIC BLOOD PRESSURE: 104 MMHG | HEART RATE: 100 BPM

## 2021-02-28 NOTE — PROGRESS NOTES
Entered room with allie Gallego upon hearing pt coughing. Pt spitting out moderate amount of thick brownish colored sputum on sheets,gown,and fitted sheet. Assisted in getting pt bathed and linens changed. Pt very resistant to care. Medicated for pain/agitation. Attempted mouth care but pt would not tolerate it. Emotional support provided. Pt will not leave bandage to right wrist and it is not replaced at this time. All comfort and safety measures continue. [facility] Progress Note    Subjective:  Vent settings not stable enough yesterday to perform CPAP trials.  Patient remains arousable to verbal stimuli and opens eyes.  No recent fevers.    PHYSICAL EXAM:  Vitals with min/max:    Vital Last Value 24 Hour Range   Temperature 98.8 °F (37.1 °C) (02/28/21 0400) Temp  Min: 98.6 °F (37 °C)  Max: 99 °F (37.2 °C)   Pulse (!) 57 (02/28/21 0900) Pulse  Min: 51  Max: 68   Respiratory (!) 26 (02/28/21 0900) Resp  Min: 17  Max: 30   Non-Invasive  Blood Pressure (!) 164/75 (02/28/21 0800) BP  Min: 145/73  Max: 184/88   Pulse Oximetry 100 % (02/28/21 0814) SpO2  Min: 89 %  Max: 100 %   Arterial   Blood Pressure   No data recorded     Physical Exam  Constitutional:       General: He is not in acute distress.     Appearance: He is not ill-appearing, toxic-appearing or diaphoretic.      Comments: Opens eyes and interacts, sluggishly follows commands   HENT:      Head: Normocephalic and atraumatic.      Mouth/Throat:      Mouth: Mucous membranes are moist.      Pharynx: No oropharyngeal exudate or posterior oropharyngeal erythema.   Eyes:      General: No scleral icterus.     Comments: Dilated pupils noted on exam    Neck:      Musculoskeletal: Neck supple. No neck rigidity or muscular tenderness.      Vascular: No carotid bruit.   Cardiovascular:      Rate and Rhythm: Normal rate and regular rhythm.      Heart sounds: Normal heart sounds. No murmur. No friction rub. No gallop.    Pulmonary:      Breath sounds: No wheezing, rhonchi or rales.      Comments: Decreased breath sounds bilaterally  Abdominal:      General: Bowel sounds are normal. There is no distension.      Palpations: Abdomen is soft.      Tenderness: There is no abdominal tenderness. There is no guarding or rebound.   Musculoskeletal: Normal range of motion.         General: No swelling or deformity.      Right lower leg: No edema.      Left lower leg: No edema.   Lymphadenopathy:      Cervical: No  cervical adenopathy.   Skin:     General: Skin is warm.      Findings: No erythema or rash.   Neurological:      General: No focal deficit present.      Mental Status: He is alert and oriented to person, place, and time. Mental status is at baseline.      Cranial Nerves: Cranial nerve deficit:    Psychiatric:         Thought Content: Thought content normal.         Judgment: Judgment normal.     Lines: RIJ placed on 2/16, arterial line placed on 2/16    Labs  Recent Labs   Lab 02/28/21  0315   WBC 6.7   RBC 3.80*   HGB 11.9*   HCT 38.4*   PLT 83*     Recent Labs   Lab 02/28/21  0315 02/27/21  0316 02/26/21  1718   SODIUM 137 141 141   POTASSIUM 3.7 4.1 3.5   CHLORIDE 103 104 103   CO2 30 33* 32   BUN 28* 23* 23*   CREATININE 0.76 0.67 0.75   GLUCOSE 128* 119* 141*   CALCIUM 8.2* 8.0* 7.9*     2/25/21-   Ferritin 659   D-dimer 1.05  CRP <0.3  PCT 0.08  CK 39   Lactic acid 1.1    2/17/21-   Ferritin 1,521  D-dimer 0.68  PCT 0.59  Quantiferon TB gold test negative  Hep B surf Ag negative, Hep B core IgM antibody negative, Hep C Ab positive    MICRO-  2/21/21- Rapid SARS CoV-2 PCR detected  2/14/21- Blood cultures negative x 2 (final)  2/13/21- Blood cultures negative x 2 (final)  2/13/21- MRSA nares PCR not detected    1/31/21- Urine cultures no growth  1/30/21- Blood cultures negative x 2 (final)  1/30/21- Rapid SARS CoV-2 PCR detected    IMAGING-  2/27/21- CXR portable  IMPRESSION:  FINDINGS AND IMPRESSION:  Endotracheal and enteric tubes and right IJ central venous catheter unchanged.  Heart size stable.  Ill-defined confluent reticular interstitial and airspace patchy opacities bilaterally redemonstrated.  No pleural effusion or pneumothorax.    Impression/Plan:    1) COVID-19 pneumonia with hypoxic respiratory failure  - further desaturation and deterioration noted on 2/16 am so he required emergent intubation unfortunately on 2/16  - s/p Remdesivir IV from 1/31- 2/3   - s/p Dexamethasone 1/31- 2/9 (11 day  course), restarted on dexamethasone on 2/14 given increasing oxygen demands recently  - he is currently on intermediate anticoagulation  - he had a CT chest per PE protocol performed on 2/13 given profound hypoxemia which was unrevealing for PE and revealed multifocal atypical pneumonia findings  - given rapid clinical deterioration and desaturation patient on 2/16 he was given tocilizumab IV x 1, recent studies have shown that it has survival benefit if given with COVID-19 patients with severe illness  - patient also started on acyclovir 400 mg via OGT twice daily on 2/16 for HSV prophylaxis given recent tocilizumab administration  - Quantiferon TB gold test negative, Hep B studies are negative, he has known Hep C  - he has been weaned off of nitric oxide now  - he is currently on dexamethasone which he remains on at this time, restarted on 2/14 given clinical worsening; he is on dexamethasone taper currently at 4mg daily and scheduled to end on 3/5/21  - monitor oxygenation, further decision on CPAP trials and vent management as per ICU team    2) Fevers  - now resolved  - unclear etiology, could be from hyper-inflammatory state of COVID-19 as fevers improved after tocilizumab was administered  - completed course of Cefepime IV from 2/13 - 2/20 for possible HCAP   - monitor off antibiotics for now    3) Hepatitis C  - Hep C quantitative ,000 (5.875 log) from 12/2/20   - hepatic steatosis noted on liver ultrasound from 1/13/21  - continue Epclusa, now through OGT  - he should be on Epclusa for 2 months total, we can clarify with his hepatologist if needed    4) HTN  - currently holding current BP meds (amlodipine, HCTZ and metoprolol)    5) Anisocoria in R pupil  - unclear if from stroke or other cause  - neurology following    Michelle Hernandez DO  [facility]

## (undated) DEVICE — (D)PREP SKN CHLRAPRP APPL 26ML -- CONVERT TO ITEM 371833

## (undated) DEVICE — DRAPE ROBOTIC CAM HD DISP ENDOWRIST DA VINCI SI

## (undated) DEVICE — MARYLAND BIPOLAR FORCEPS: Brand: ENDOWRIST;DAVINCI SI

## (undated) DEVICE — 2, DISPOSABLE SUCTION/IRRIGATOR WITHOUT DISPOSABLE TIP: Brand: STRYKEFLOW

## (undated) DEVICE — BLOCK BITE AD 60FR W/ VELC STRP ADDRESSES MOST PT AND

## (undated) DEVICE — BIPOLAR FORCEPS CORD,BANANA LEADS: Brand: VALLEYLAB

## (undated) DEVICE — CARDINAL HEALTH FLEXIBLE LIGHT HANDLE COVER: Brand: CARDINAL HEALTH

## (undated) DEVICE — STERILE SLEEVE: Brand: CONVERTORS

## (undated) DEVICE — WARMER SCP LAP

## (undated) DEVICE — KENDALL SCD EXPRESS SLEEVES, KNEE LENGTH, MEDIUM: Brand: KENDALL SCD

## (undated) DEVICE — KENDALL RADIOLUCENT FOAM MONITORING ELECTRODE RECTANGULAR SHAPE: Brand: KENDALL

## (undated) DEVICE — TIP COVER ACCESSORY

## (undated) DEVICE — SUTURE MCRYL SZ 4-0 L27IN ABSRB UD L19MM PS-2 1/2 CIR PRIM Y426H

## (undated) DEVICE — SUTURE SZ 0 27IN 5/8 CIR UR-6  TAPER PT VIOLET ABSRB VICRYL J603H

## (undated) DEVICE — CONNECTOR TBNG OD5-7MM O2 END DISP

## (undated) DEVICE — OBTRTR BLDELSS 8MM DISP -- DA VINCI - SNGL USE

## (undated) DEVICE — CARTRIDGE CLP LIG HEMLOK GRN --

## (undated) DEVICE — BLADELESS OPTICAL TROCAR WITH FIXATION CANNULA: Brand: VERSAPORT

## (undated) DEVICE — NEEDLE HYPO 21GA L1.5IN INTRAMUSCULAR S STL LATCH BVL UP

## (undated) DEVICE — CADIERE FORCEPS: Brand: ENDOWRIST;DAVINCI SI

## (undated) DEVICE — LAP CHOLE: Brand: MEDLINE INDUSTRIES, INC.

## (undated) DEVICE — COVER,TABLE,44X76,STERILE: Brand: MEDLINE

## (undated) DEVICE — APPLICATOR BNDG 1MM ADH PREMIERPRO EXOFIN

## (undated) DEVICE — PERMANENT CAUTERY HOOK: Brand: ENDOWRIST;DAVINCI SI

## (undated) DEVICE — CANNULA SEAL

## (undated) DEVICE — DRAPE,TOP,102X53,STERILE: Brand: MEDLINE

## (undated) DEVICE — E-Z CLEAN, PTFE COATED, ELECTROSURGICAL LAPAROSCOPIC ELECTRODE, SPATULA, 33 CM., SINGLE-USE, FOR USE WITH HAND CONTROL PENCIL: Brand: MEGADYNE

## (undated) DEVICE — MEDIUM-LARGE CLIP APPLIER: Brand: ENDOWRIST;DAVINCI SI

## (undated) DEVICE — CANNULA NSL ORAL AD FOR CAPNOFLEX CO2 O2 AIRLFE

## (undated) DEVICE — DRAPE INSTR ARM ROBOTIC ENDOWRIST DA VINCI S

## (undated) DEVICE — CONTAINER SPEC FRMLN 120ML --

## (undated) DEVICE — NEEDLE BX 14GA LAIN 20MM SPEC NOTCH SCALP SHRP SURG STL CUT

## (undated) DEVICE — ELECTRO LUBE IS A SINGLE PATIENT USE DEVICE THAT IS INTENDED TO BE USED ON ELECTROSURGICAL ELECTRODES TO REDUCE STICKING.: Brand: KEY SURGICAL ELECTRO LUBE

## (undated) DEVICE — [HIGH FLOW INSUFFLATOR,  DO NOT USE IF PACKAGE IS DAMAGED,  KEEP DRY,  KEEP AWAY FROM SUNLIGHT,  PROTECT FROM HEAT AND RADIOACTIVE SOURCES.]: Brand: PNEUMOSURE

## (undated) DEVICE — DRAPE ROBOTIC CAM ARM DISP ENDOWRIST DA VINCI SI

## (undated) DEVICE — VISUALIZATION SYSTEM: Brand: CLEARIFY

## (undated) DEVICE — BLADELESS OPTICAL TROCAR WITH FIXATION CANNULA: Brand: VERSAONE

## (undated) DEVICE — SPECIMEN RETRIEVAL POUCH: Brand: ENDO CATCH GOLD

## (undated) DEVICE — TROCARS: Brand: KII® BALLOON BLUNT TIP SYSTEM

## (undated) DEVICE — REM POLYHESIVE ADULT PATIENT RETURN ELECTRODE: Brand: VALLEYLAB

## (undated) DEVICE — DRAPE TWL SURG 16X26IN BLU ORB04] ALLCARE INC]

## (undated) DEVICE — GOWN,REINF,POLY,ECL,PP SLV,XL: Brand: MEDLINE

## (undated) DEVICE — LOGICUT SCISSOR LENGTH 320MM: Brand: LOGI - LAPAROSCOPIC INSTRUMENT SYSTEM

## (undated) DEVICE — MONOPOLAR CURVED SCISSORS: Brand: ENDOWRIST

## (undated) DEVICE — SUCTION/IRRIGATOR: Brand: ENDOWRIST;DAVINCI SI